# Patient Record
Sex: MALE | Race: WHITE | NOT HISPANIC OR LATINO | URBAN - METROPOLITAN AREA
[De-identification: names, ages, dates, MRNs, and addresses within clinical notes are randomized per-mention and may not be internally consistent; named-entity substitution may affect disease eponyms.]

---

## 2018-07-11 ENCOUNTER — APPOINTMENT (RX ONLY)
Dept: URBAN - METROPOLITAN AREA CLINIC 31 | Facility: CLINIC | Age: 23
Setting detail: DERMATOLOGY
End: 2018-07-11

## 2018-07-11 DIAGNOSIS — L20.89 OTHER ATOPIC DERMATITIS: ICD-10-CM

## 2018-07-11 DIAGNOSIS — L85.3 XEROSIS CUTIS: ICD-10-CM

## 2018-07-11 PROBLEM — L20.84 INTRINSIC (ALLERGIC) ECZEMA: Status: ACTIVE | Noted: 2018-07-11

## 2018-07-11 PROBLEM — J45.909 UNSPECIFIED ASTHMA, UNCOMPLICATED: Status: ACTIVE | Noted: 2018-07-11

## 2018-07-11 PROBLEM — K21.9 GASTRO-ESOPHAGEAL REFLUX DISEASE WITHOUT ESOPHAGITIS: Status: ACTIVE | Noted: 2018-07-11

## 2018-07-11 PROCEDURE — ? COUNSELING: TOPICAL STEROIDS

## 2018-07-11 PROCEDURE — 99203 OFFICE O/P NEW LOW 30 MIN: CPT

## 2018-07-11 PROCEDURE — ? COUNSELING

## 2018-07-11 PROCEDURE — ? PRESCRIPTION

## 2018-07-11 RX ORDER — HYDROCORTISONE 25 MG/G
1 CREAM TOPICAL BID
Qty: 1 | Refills: 3 | Status: ERX | COMMUNITY
Start: 2018-07-11

## 2018-07-11 RX ORDER — TRIAMCINOLONE ACETONIDE 1 MG/G
CREAM TOPICAL BID
Qty: 1 | Refills: 2 | Status: ERX | COMMUNITY
Start: 2018-07-11

## 2018-07-11 RX ADMIN — TRIAMCINOLONE ACETONIDE: 1 CREAM TOPICAL at 21:18

## 2018-07-11 RX ADMIN — HYDROCORTISONE 1: 25 CREAM TOPICAL at 21:18

## 2018-07-11 ASSESSMENT — LOCATION DETAILED DESCRIPTION DERM
LOCATION DETAILED: RIGHT ANTERIOR PROXIMAL THIGH
LOCATION DETAILED: LEFT CENTRAL MALAR CHEEK
LOCATION DETAILED: EPIGASTRIC SKIN
LOCATION DETAILED: RIGHT ANTECUBITAL SKIN
LOCATION DETAILED: RIGHT POPLITEAL SKIN
LOCATION DETAILED: RIGHT ULNAR DORSAL HAND
LOCATION DETAILED: RIGHT PROXIMAL DORSAL FOREARM
LOCATION DETAILED: LEFT RADIAL DORSAL HAND
LOCATION DETAILED: LEFT POPLITEAL SKIN
LOCATION DETAILED: RIGHT LATERAL MALAR CHEEK
LOCATION DETAILED: LEFT DISTAL POSTERIOR THIGH
LOCATION DETAILED: RIGHT DISTAL POSTERIOR THIGH
LOCATION DETAILED: LEFT ANTECUBITAL SKIN
LOCATION DETAILED: LEFT PROXIMAL DORSAL FOREARM
LOCATION DETAILED: LEFT ANTERIOR PROXIMAL THIGH

## 2018-07-11 ASSESSMENT — LOCATION SIMPLE DESCRIPTION DERM
LOCATION SIMPLE: LEFT CHEEK
LOCATION SIMPLE: LEFT FOREARM
LOCATION SIMPLE: LEFT THIGH
LOCATION SIMPLE: RIGHT FOREARM
LOCATION SIMPLE: ABDOMEN
LOCATION SIMPLE: LEFT POSTERIOR THIGH
LOCATION SIMPLE: RIGHT POSTERIOR THIGH
LOCATION SIMPLE: RIGHT THIGH
LOCATION SIMPLE: RIGHT POPLITEAL SKIN
LOCATION SIMPLE: RIGHT HAND
LOCATION SIMPLE: LEFT POPLITEAL SKIN
LOCATION SIMPLE: RIGHT CHEEK
LOCATION SIMPLE: LEFT HAND
LOCATION SIMPLE: RIGHT ELBOW
LOCATION SIMPLE: LEFT ELBOW

## 2018-07-11 ASSESSMENT — LOCATION ZONE DERM
LOCATION ZONE: FACE
LOCATION ZONE: HAND
LOCATION ZONE: ARM
LOCATION ZONE: TRUNK
LOCATION ZONE: LEG

## 2018-07-11 NOTE — PROCEDURE: COUNSELING
Detail Level: Zone
Patient Specific Counseling (Will Not Stick From Patient To Patient): We discussed protopic as well for face but would need to try HCT for insurance purposes.

## 2018-07-11 NOTE — HPI: DRY SKIN
How Severe Is Your Dry Skin?: mild
Additional History: Patient has rx of tax cream and fluocinonide for topical eczema

## 2018-10-12 ENCOUNTER — OFFICE VISIT (OUTPATIENT)
Dept: MEDICAL GROUP | Age: 23
End: 2018-10-12
Payer: COMMERCIAL

## 2018-10-12 VITALS
HEIGHT: 71 IN | WEIGHT: 202 LBS | TEMPERATURE: 98.7 F | BODY MASS INDEX: 28.28 KG/M2 | HEART RATE: 87 BPM | SYSTOLIC BLOOD PRESSURE: 90 MMHG | DIASTOLIC BLOOD PRESSURE: 64 MMHG | OXYGEN SATURATION: 92 %

## 2018-10-12 DIAGNOSIS — K21.9 GASTROESOPHAGEAL REFLUX DISEASE, ESOPHAGITIS PRESENCE NOT SPECIFIED: ICD-10-CM

## 2018-10-12 DIAGNOSIS — E53.8 B12 DEFICIENCY: ICD-10-CM

## 2018-10-12 DIAGNOSIS — G47.33 OSA (OBSTRUCTIVE SLEEP APNEA): ICD-10-CM

## 2018-10-12 DIAGNOSIS — J45.20 MILD INTERMITTENT ASTHMA, UNSPECIFIED WHETHER COMPLICATED: ICD-10-CM

## 2018-10-12 PROCEDURE — 99204 OFFICE O/P NEW MOD 45 MIN: CPT | Performed by: PHYSICIAN ASSISTANT

## 2018-10-12 RX ORDER — FLUOCINONIDE 0.5 MG/G
OINTMENT TOPICAL 2 TIMES DAILY
COMMUNITY
End: 2022-02-09

## 2018-10-12 RX ORDER — ALBUTEROL SULFATE 90 UG/1
2 AEROSOL, METERED RESPIRATORY (INHALATION) EVERY 6 HOURS PRN
Qty: 8.5 G | Refills: 5 | Status: SHIPPED | OUTPATIENT
Start: 2018-10-12 | End: 2019-02-21 | Stop reason: SDUPTHER

## 2018-10-12 RX ORDER — CYANOCOBALAMIN 1000 UG/ML
1000 INJECTION, SOLUTION INTRAMUSCULAR; SUBCUTANEOUS
COMMUNITY
End: 2019-04-05

## 2018-10-12 RX ORDER — OMEPRAZOLE 20 MG/1
20 CAPSULE, DELAYED RELEASE ORAL DAILY
COMMUNITY
End: 2019-07-08

## 2018-10-12 RX ORDER — RANITIDINE 150 MG/1
150 TABLET ORAL 2 TIMES DAILY
COMMUNITY
End: 2019-07-08

## 2018-10-12 ASSESSMENT — PATIENT HEALTH QUESTIONNAIRE - PHQ9: CLINICAL INTERPRETATION OF PHQ2 SCORE: 0

## 2018-10-13 NOTE — ASSESSMENT & PLAN NOTE
This is a chronic problem.  The patient was diagnosed at 17 after several months of waking with morning headaches and not feeling well rested.  He does have a family history of sleep apnea and his father, and is followed by pulmonology for this.  He uses a CPAP which has vastly improved his sleep.

## 2018-10-13 NOTE — ASSESSMENT & PLAN NOTE
"This is a chronic problem.  The patient tells me that he had a period where \"health was declining,\" where he couldn't walk well and couldn't maintain muscle tone, and was having frequent migraines.  After significant testing, he was diagnosed with B12 deficiency which is likely secondary to his long-term omeprazole use.  He follows with Dr. Linares his neurologist for B12 injections and ongoing care.    "

## 2018-10-13 NOTE — ASSESSMENT & PLAN NOTE
This is a chronic problem that is well controlled with intermittent use of his albuterol inhaler.  He requests a refill on this today, but notes that he only has to use this occasionally, typically about twice a month.  No current exacerbation.

## 2018-10-13 NOTE — ASSESSMENT & PLAN NOTE
This is a chronic problem.  Currently taking omeprazole 20 mg  in the morning with ranitidine 150 mg twice daily, which controls his symptoms well.  He denies cough, dysphasia, history of GI bleeding.

## 2018-10-13 NOTE — PROGRESS NOTES
"CC: B12 deficiency, sleep apnea, GERD, asthma    History of Present Illness: This is a 23 y.o. male new patient who presents today to establish care and discuss the chronic conditions outlined below. This patient previously saw a provider in [place] for primary care. Other specialists include pulmonology, neurology. This patient has a past medical history significant for B12 deficiency, obstructive sleep apnea, GERD, asthma.    BOB (obstructive sleep apnea)  This is a chronic problem.  The patient was diagnosed at 17 after several months of waking with morning headaches and not feeling well rested.  He does have a family history of sleep apnea and his father, and is followed by pulmonology for this.  He uses a CPAP which has vastly improved his sleep.      B12 deficiency  This is a chronic problem.  The patient tells me that he had a period where \"health was declining,\" where he couldn't walk well and couldn't maintain muscle tone, and was having frequent migraines.  After significant testing, he was diagnosed with B12 deficiency which is likely secondary to his long-term omeprazole use.  He follows with Dr. Linares his neurologist for B12 injections and ongoing care.      GERD (gastroesophageal reflux disease)  This is a chronic problem.  Currently taking omeprazole 20 mg  in the morning with ranitidine 150 mg twice daily, which controls his symptoms well.  He denies cough, dysphasia, history of GI bleeding.    Intermittent asthma  This is a chronic problem that is well controlled with intermittent use of his albuterol inhaler.  He requests a refill on this today, but notes that he only has to use this occasionally, typically about twice a month.  No current exacerbation.      Patient Active Problem List    Diagnosis Date Noted   • BOB (obstructive sleep apnea) 10/12/2018   • B12 deficiency 10/12/2018   • GERD (gastroesophageal reflux disease) 07/05/2011   • Intermittent asthma 10/14/2008          Additional " History:     Allergies   Allergen Reactions   • Ibuprofen Swelling       Current medicines (including changes today)  Current Outpatient Prescriptions   Medication Sig Dispense Refill   • omeprazole (PRILOSEC) 20 MG delayed-release capsule Take 20 mg by mouth every day.     • raNITidine (ZANTAC) 150 MG Tab Take 150 mg by mouth 2 times a day.     • Cetirizine HCl (ZYRTEC ALLERGY PO) Take  by mouth.     • fluocinonide (LIDEX) 0.05 % Ointment Apply  to affected area(s) 2 times a day.     • cyanocobalamin (VITAMIN B-12) 1000 MCG/ML Solution 1,000 mcg by Intramuscular route every 30 days.     • albuterol 108 (90 Base) MCG/ACT Aero Soln inhalation aerosol Inhale 2 Puffs by mouth every 6 hours as needed for Shortness of Breath. 8.5 g 5     No current facility-administered medications for this visit.      He  has a past medical history of Asthma; B12 deficiency (10/12/2018); Eczema; GERD (gastroesophageal reflux disease); and BOB (obstructive sleep apnea) (10/12/2018).  He  has no past surgical history on file.  Social History   Substance Use Topics   • Smoking status: Former Smoker   • Smokeless tobacco: Never Used   • Alcohol use Yes      Comment: one or two a week       No family history on file.  No family status information on file.       Patient Active Problem List    Diagnosis Date Noted   • BOB (obstructive sleep apnea) 10/12/2018   • B12 deficiency 10/12/2018   • GERD (gastroesophageal reflux disease) 07/05/2011   • Intermittent asthma 10/14/2008         Review of Systems:   Constitutional: Negative for fever, chills, unexpected weight change, fatigue, malaise and generalized weakness.   Eyes: Negative for blurred or double vision, eye pain, eye discharge.  ENT: Negative for headaches, hearing changes, ear pain, ear discharge, rhinorrhea, sinus congestion, sore throat, and neck pain.   Respiratory: Negative for cough, sputum production, chest congestion, dyspnea, wheezing, and crackles.   Cardiovascular: Negative  "for chest pain, palpitations, orthopnea, and bilateral lower extremity edema.   Gastrointestinal: Negative for heartburn, nausea, vomiting, abdominal pain, hematochezia, melena, diarrhea, constipation, and greasy/foul-smelling stools.   Genitourinary: Negative for dysuria, polyuria, hematuria, pyuria, urgency, frequency and incontinence.  Musculoskeletal: Negative for myalgias, back pain, and joint pain.   Skin: Negative for rash, itching, cyanotic skin color change.   Neurological: Negative for dizziness, tingling, tremors, focal sensory deficit, focal weakness and headaches.   Heme: Does not bruise/bleed easily.    Endocrine: Negative for heat or cold intolerance, polydipsia, polyuria.  Psychiatric/Behavioral: Negative for depression, suicidal or homicidal ideation and memory loss.         Physical Exam:   Vitals: Blood pressure (!) 90/64, pulse 87, temperature 37.1 °C (98.7 °F), temperature source Temporal, height 1.803 m (5' 11\"), weight 91.6 kg (202 lb), SpO2 92 %.  BMI: Body mass index is 28.17 kg/m².  General/Constitutional: Vitals as above, well nourished, well developed male in no acute distress  Head: Head is grossly normal & atraumatic.  Eyes: Bilateral conjunctivae clear and not injected, bilateral EOMI, bilateral PERRL  Neck: Neck supple, no masses, neck non-tender to palpation, no thyromegaly/goiter.  Lymph: No adenopathy in anterior/posterior cervical and supra-/infrascapular nodes.   Respiratory: Normal effort, lungs are clear to auscultation in all fields (anterior, lateral, posterior), no wheezing, rhonchi or rales.  Cardiovascular: Regular rate and rhythm without murmurs, gallops or rubs, no bilateral lower extremity edema.  Musculoskeletal: Gait grossly normal & not antalgic, no tenderness to percussion of vertebral processes, no CVAT.  Skin: Warm and dry with no apparent rashes or lesions.  Neuro: Gross motor movement intact in all 4 extremities, gross sensation intact to extremities and trunk, " gait grossly normal and not antalgic.  Cranial nerve examination: Pupils equally round and react to light. Extraocular muscles are intact. Visual fields intact. No facial droop. Hearing intact to conversation. Soft palate rises symmetrically bilaterally with uvula midline. Tongue midline and cranial nerve 12 intact. No abnormal facial movements. Resisted shoulder shrug 5/5 bilaterally.  Psych: Judgment grossly appropriate, no apparent depression/anxiety.      Health Maintenance: Due for immunizations, declines these today.    Imaging/Labs: N/A    Assessment/Plan:  Care has been established  We reviewed USPSTF guidelines  Records requests sent to previous care providers  Denies intimate partner violence    1. BOB (obstructive sleep apnea)  Controlled with current medications and lifestyle measures. No changes are indicated at this time.   - REFERRAL TO PULMONOLOGY    2. B12 deficiency  Controlled with current medications and lifestyle measures. No changes are indicated at this time.   - REFERRAL TO NEUROLOGY    3. Mild intermittent asthma, unspecified whether complicated  Controlled with current medications and lifestyle measures. No changes are indicated at this time.   - albuterol 108 (90 Base) MCG/ACT Aero Soln inhalation aerosol; Inhale 2 Puffs by mouth every 6 hours as needed for Shortness of Breath.  Dispense: 8.5 g; Refill: 5    4. Gastroesophageal reflux disease, esophagitis presence not specified  Controlled with current medications and lifestyle measures. No changes are indicated at this time.         Return if symptoms worsen or fail to improve.      Please note that this dictation was created using voice recognition software. I have made every reasonable attempt to correct obvious errors, but I expect that there are errors of grammar and possibly content that I did not discover before finalizing the note.

## 2018-10-24 ENCOUNTER — HOSPITAL ENCOUNTER (OUTPATIENT)
Dept: LAB | Facility: MEDICAL CENTER | Age: 23
End: 2018-10-24
Attending: NURSE PRACTITIONER
Payer: COMMERCIAL

## 2018-10-24 LAB
FOLATE SERPL-MCNC: 14.5 NG/ML
VIT B12 SERPL-MCNC: 881 PG/ML (ref 211–911)

## 2018-10-24 PROCEDURE — 82746 ASSAY OF FOLIC ACID SERUM: CPT

## 2018-10-24 PROCEDURE — 82607 VITAMIN B-12: CPT

## 2018-10-24 PROCEDURE — 36415 COLL VENOUS BLD VENIPUNCTURE: CPT

## 2019-01-24 ENCOUNTER — OFFICE VISIT (OUTPATIENT)
Dept: MEDICAL GROUP | Facility: MEDICAL CENTER | Age: 24
End: 2019-01-24
Payer: COMMERCIAL

## 2019-01-24 VITALS
HEART RATE: 72 BPM | OXYGEN SATURATION: 98 % | BODY MASS INDEX: 27.44 KG/M2 | TEMPERATURE: 98.2 F | DIASTOLIC BLOOD PRESSURE: 74 MMHG | SYSTOLIC BLOOD PRESSURE: 112 MMHG | RESPIRATION RATE: 18 BRPM | WEIGHT: 196 LBS | HEIGHT: 71 IN

## 2019-01-24 DIAGNOSIS — G47.33 OSA (OBSTRUCTIVE SLEEP APNEA): ICD-10-CM

## 2019-01-24 DIAGNOSIS — G47.00 INSOMNIA, UNSPECIFIED TYPE: ICD-10-CM

## 2019-01-24 DIAGNOSIS — G47.09 OTHER INSOMNIA: ICD-10-CM

## 2019-01-24 DIAGNOSIS — E53.8 B12 DEFICIENCY: ICD-10-CM

## 2019-01-24 DIAGNOSIS — F41.9 ANXIETY: ICD-10-CM

## 2019-01-24 PROCEDURE — 99204 OFFICE O/P NEW MOD 45 MIN: CPT | Performed by: FAMILY MEDICINE

## 2019-01-24 RX ORDER — AMITRIPTYLINE HYDROCHLORIDE 25 MG/1
TABLET, FILM COATED ORAL
Qty: 180 TAB | Refills: 1 | Status: SHIPPED | OUTPATIENT
Start: 2019-01-24 | End: 2019-07-08

## 2019-01-24 RX ORDER — AMITRIPTYLINE HYDROCHLORIDE 25 MG/1
TABLET, FILM COATED ORAL
Qty: 180 TAB | Refills: 1 | Status: SHIPPED | OUTPATIENT
Start: 2019-01-24 | End: 2019-01-24 | Stop reason: SDUPTHER

## 2019-01-24 RX ORDER — TRAZODONE HYDROCHLORIDE 50 MG/1
TABLET ORAL
Qty: 60 TAB | Refills: 3 | Status: SHIPPED | OUTPATIENT
Start: 2019-01-24 | End: 2019-01-24

## 2019-01-24 ASSESSMENT — PATIENT HEALTH QUESTIONNAIRE - PHQ9: CLINICAL INTERPRETATION OF PHQ2 SCORE: 0

## 2019-01-25 NOTE — ASSESSMENT & PLAN NOTE
Has problems falling asleep-->we discuss sleep hygine in detail   Wakes up a lot --> insomnia 2/2 other medical condition such as anxiety and sleep apnea  He has tried benzo before noticed dep/tolerance he is afraid of that   I discuss trazodone for prn use or safe for long term use   I discuss TCA which is helpful for sleep and anxiety he is interested in a trial of this      Over 45 minutes spent with patient face to face, greater than 50% time spent with plan/coordination of care regarding that which is discussed in the HPI and A&P

## 2019-01-25 NOTE — PROGRESS NOTES
This medical record contains text that has been entered with the assistance of computer voice recognition and dictation software.  Therefore, it may contain unintended errors in text, spelling, punctuation, or grammar        Chief Complaint   Patient presents with   • Other     Referral needed to Psychologist to help deal with sleep problems.        Santo Hernandez is a 23 y.o. male here evaluation and management of:     See above  He and dad both have horrible insomnia  Dad lives with him during the week   Anxiety too   Sleep apnea too  Asking for referral to a particular sleep clinic     Also wants to hear my thoughts      He grew up [place] area  Lived in [place] mostly   Moved to [place] for a couple years when his grandma was on hospice  New job in computer hardware repair x 6 mo         Current Outpatient Prescriptions   Medication Sig Dispense Refill   • amitriptyline (ELAVIL) 25 MG Tab Take 1 tab nightly for 3 nights then 2 tabs nightly for 3 nights then 3 tabs nightly for 3 nights then 4 tabs nightly indefinitely 180 Tab 1   • cyanocobalamin (VITAMIN B-12) 1000 MCG/ML Solution 1,000 mcg by Intramuscular route every 30 days.     • omeprazole (PRILOSEC) 20 MG delayed-release capsule Take 20 mg by mouth every day.     • raNITidine (ZANTAC) 150 MG Tab Take 150 mg by mouth 2 times a day.     • Cetirizine HCl (ZYRTEC ALLERGY PO) Take  by mouth.     • fluocinonide (LIDEX) 0.05 % Ointment Apply  to affected area(s) 2 times a day.     • albuterol 108 (90 Base) MCG/ACT Aero Soln inhalation aerosol Inhale 2 Puffs by mouth every 6 hours as needed for Shortness of Breath. 8.5 g 5     No current facility-administered medications for this visit.      Patient Active Problem List    Diagnosis Date Noted   • Other insomnia 01/24/2019   • Anxiety 01/24/2019   • BOB (obstructive sleep apnea) 10/12/2018   • B12 deficiency 10/12/2018   • GERD (gastroesophageal reflux disease) 07/05/2011   • Intermittent asthma 10/14/2008     History  "reviewed. No pertinent surgical history.   Social History   Substance Use Topics   • Smoking status: Former Smoker   • Smokeless tobacco: Never Used   • Alcohol use Yes      Comment: one or two a week     History reviewed. No pertinent family history.        ROS  No n/v  all review of system completed and negative except for those listed above     Objective:     Blood pressure 112/74, pulse 72, temperature 36.8 °C (98.2 °F), temperature source Temporal, resp. rate 18, height 1.803 m (5' 11\"), weight 88.9 kg (196 lb), SpO2 98 %. Body mass index is 27.34 kg/m².  Physical Exam:    Constitutional: Alert, no distress.  Skin: Warm, dry, good turgor, no rashes in visible areas.  Eye: Equal, round and reactive, conjunctiva clear, lids normal.  ENMT: Lips without lesions, good dentition, oropharynx clear.  Neck: Trachea midline, no masses, no thyromegaly. No cervical or supraclavicular lymphadenopathy.  Respiratory: Unlabored respiratory effort, lungs clear to auscultation, no wheezes, no ronchi.  Cardiovascular: Normal S1, S2, no murmur, no edema.  Abdomen: Soft, non-tender, no masses, no hepatosplenomegaly.  Psych: Alert and oriented x3, normal affect and mood.              Assessment and Plan:   The following treatment plan was discussed        Problem List Items Addressed This Visit     BOB (obstructive sleep apnea)     Has known BOB    Ref to pulm              Relevant Orders    REFERRAL TO SLEEP STUDIES    B12 deficiency     Said he had the full work up but is not sure if intrinsic factor was tested      See orders             Relevant Orders    VITAMIN B12    INTRINSIC FACTOR AB    Other insomnia     Has problems falling asleep-->we discuss sleep hygine in detail   Wakes up a lot --> insomnia 2/2 other medical condition such as anxiety and sleep apnea  He has tried benzo before noticed dep/tolerance he is afraid of that   I discuss trazodone for prn use or safe for long term use   I discuss TCA which is helpful for " "sleep and anxiety he is interested in a trial of this      Over 45 minutes spent with patient face to face, greater than 50% time spent with plan/coordination of care regarding that which is discussed in the HPI and A&P           Anxiety     He has tried and failed \"many\" sSRI SNRI   He is asking for ref to psychiatry             Relevant Medications    amitriptyline (ELAVIL) 25 MG Tab    Other Relevant Orders    REFERRAL TO PSYCHOLOGY    REFERRAL TO BEHAVIORAL HEALTH      Other Visit Diagnoses     Insomnia, unspecified type        Relevant Medications    amitriptyline (ELAVIL) 25 MG Tab                Instructed to follow up if symptoms worsen or fail to improve, ER/UC precautions discussed as well    Kristina Alva MD  [facility], Family Medicine   [address] [place] [address] [address]   [place] [id number]  Phone: [phone]             "

## 2019-02-19 ENCOUNTER — HOSPITAL ENCOUNTER (OUTPATIENT)
Dept: LAB | Facility: MEDICAL CENTER | Age: 24
End: 2019-02-19
Attending: PSYCHIATRY & NEUROLOGY
Payer: COMMERCIAL

## 2019-02-19 LAB — FOLATE SERPL-MCNC: 7 NG/ML

## 2019-02-19 PROCEDURE — 36415 COLL VENOUS BLD VENIPUNCTURE: CPT

## 2019-02-19 PROCEDURE — 82607 VITAMIN B-12: CPT

## 2019-02-19 PROCEDURE — 82746 ASSAY OF FOLIC ACID SERUM: CPT

## 2019-02-20 LAB — VIT B12 SERPL-MCNC: >1500 PG/ML (ref 211–911)

## 2019-02-21 DIAGNOSIS — J45.20 MILD INTERMITTENT ASTHMA, UNSPECIFIED WHETHER COMPLICATED: ICD-10-CM

## 2019-02-22 RX ORDER — ALBUTEROL SULFATE 90 UG/1
2 AEROSOL, METERED RESPIRATORY (INHALATION) EVERY 6 HOURS PRN
Qty: 8.5 G | Refills: 5 | Status: SHIPPED | OUTPATIENT
Start: 2019-02-22 | End: 2019-04-05 | Stop reason: SDUPTHER

## 2019-02-22 NOTE — TELEPHONE ENCOUNTER
----- Message from Florence August sent at 2/21/2019  4:06 PM PST -----  Regarding: Albuterol Refill  Contact: [phone]  Patient called to request refill on inhaler. From what I can see this hasn't been filled from anyone in our office. Let patient know he'd be contacted to let him know if he'll need an appt.     Preferred pharmacy: [id number] , [place] [address]         Thanks  Florence

## 2019-03-05 ENCOUNTER — OFFICE VISIT (OUTPATIENT)
Dept: URGENT CARE | Facility: CLINIC | Age: 24
End: 2019-03-05
Payer: COMMERCIAL

## 2019-03-05 VITALS
TEMPERATURE: 99.2 F | BODY MASS INDEX: 27.3 KG/M2 | SYSTOLIC BLOOD PRESSURE: 100 MMHG | RESPIRATION RATE: 16 BRPM | HEIGHT: 71 IN | OXYGEN SATURATION: 92 % | DIASTOLIC BLOOD PRESSURE: 70 MMHG | HEART RATE: 93 BPM | WEIGHT: 195 LBS

## 2019-03-05 DIAGNOSIS — R05.9 COUGH: ICD-10-CM

## 2019-03-05 DIAGNOSIS — J22 LRTI (LOWER RESPIRATORY TRACT INFECTION): ICD-10-CM

## 2019-03-05 DIAGNOSIS — J45.41 MODERATE PERSISTENT ASTHMA WITH ACUTE EXACERBATION: ICD-10-CM

## 2019-03-05 PROCEDURE — 99214 OFFICE O/P EST MOD 30 MIN: CPT | Performed by: NURSE PRACTITIONER

## 2019-03-05 RX ORDER — METHYLPREDNISOLONE 4 MG/1
4 TABLET ORAL DAILY
Qty: 1 KIT | Refills: 0 | Status: SHIPPED | OUTPATIENT
Start: 2019-03-05 | End: 2019-04-05

## 2019-03-05 RX ORDER — DOXYCYCLINE HYCLATE 100 MG
100 TABLET ORAL 2 TIMES DAILY
Qty: 14 TAB | Refills: 0 | Status: SHIPPED | OUTPATIENT
Start: 2019-03-05 | End: 2019-03-12

## 2019-03-05 ASSESSMENT — ENCOUNTER SYMPTOMS
WHEEZING: 1
FEVER: 0
DIZZINESS: 0
CHILLS: 1
MYALGIAS: 1
VOMITING: 0
EYE PAIN: 0
SORE THROAT: 0
COUGH: 1
NAUSEA: 0
SHORTNESS OF BREATH: 1

## 2019-03-06 NOTE — PROGRESS NOTES
"Subjective:   Santo Hernandez is a 23 y.o. male who presents for Cough (productive cough x2 wks)         Cough    This is a new problem. Episode onset: 2 weeks.  The problem has been gradually worsening. The problem occurs constantly. The cough is productive of sputum. Associated symptoms include chills, myalgias, nasal congestion, shortness of breath and wheezing. Pertinent negatives include no chest pain, fever, postnasal drip, rash or sore throat.  Nothing aggravates the symptoms. He has tried a beta-agonist inhaler for the symptoms. The treatment provided no relief. His past medical history is significant for asthma.  BOB     Review of Systems   Constitutional: Positive for chills. Negative for fever.   HENT: Negative for postnasal drip and sore throat.    Eyes: Negative for pain.   Respiratory: Positive for cough, shortness of breath and wheezing.    Cardiovascular: Negative for chest pain.   Gastrointestinal: Negative for nausea and vomiting.   Genitourinary: Negative for hematuria.   Musculoskeletal: Positive for myalgias.   Skin: Negative for rash.   Neurological: Negative for dizziness.     Allergies   Allergen Reactions   • Ibuprofen Swelling      Objective:   /70 (BP Location: Left arm, Patient Position: Sitting, BP Cuff Size: Adult)   Pulse 93   Temp 37.3 °C (99.2 °F)   Resp 16   Ht 1.803 m (5' 11\")   Wt 88.5 kg (195 lb)   SpO2 92%   BMI 27.20 kg/m²    Physical Exam   Constitutional: He is oriented to person, place, and time. He appears well-developed and well-nourished. No distress.   HENT:   Head: Normocephalic and atraumatic.   Right Ear: Tympanic membrane normal.   Left Ear: Tympanic membrane normal.   Nose: Nose normal. Right sinus exhibits no maxillary sinus tenderness and no frontal sinus tenderness. Left sinus exhibits no maxillary sinus tenderness and no frontal sinus tenderness.   Mouth/Throat: Uvula is midline, oropharynx is clear and moist and mucous membranes are normal. No " posterior oropharyngeal edema, posterior oropharyngeal erythema or tonsillar abscesses. No tonsillar exudate.   Eyes: Pupils are equal, round, and reactive to light. Conjunctivae and EOM are normal. Right eye exhibits no discharge. Left eye exhibits no discharge.   Cardiovascular: Normal rate and regular rhythm.    No murmur heard.  Pulmonary/Chest: Effort normal. No respiratory distress. He has decreased breath sounds. He has wheezes. He has no rhonchi. He has no rales.   Abdominal: Soft. He exhibits no distension. There is no tenderness.   Neurological: He is alert and oriented to person, place, and time. He has normal reflexes. No sensory deficit.   Skin: Skin is warm, dry and intact.   Psychiatric: He has a normal mood and affect.         Assessment/Plan:     1. LRTI (lower respiratory tract infection)  doxycycline (VIBRAMYCIN) 100 MG Tab   2. Cough     3. Moderate persistent asthma with acute exacerbation  MethylPREDNISolone (MEDROL DOSEPAK) 4 MG Tablet Therapy Pack     Patient with worsening cough times 2 weeks, diminished lung sounds with wheezing throughout.  Will start patient on doxycycline twice daily times 7 days and steroids.  Encouraged to continue using prescribed inhalers as directed.  Advised patient if symptoms not improving and/or worsen he needs to return to clinic for a chest x-ray at this time.  Patient verbalizing understanding.  Patient given precautionary s/sx that mandate immediate follow up and evaluation in the ED. Advised of risks of not doing so.    DDX, Supportive care, and indications for immediate follow-up discussed with patient.    Instructed to return to clinic or nearest emergency department if we are not available for any change in condition, further concerns, or worsening of symptoms.    The patient demonstrated a good understanding and agreed with the treatment plan.;l

## 2019-04-03 ENCOUNTER — HOSPITAL ENCOUNTER (EMERGENCY)
Facility: MEDICAL CENTER | Age: 24
End: 2019-04-03
Attending: EMERGENCY MEDICINE
Payer: COMMERCIAL

## 2019-04-03 ENCOUNTER — APPOINTMENT (OUTPATIENT)
Dept: RADIOLOGY | Facility: MEDICAL CENTER | Age: 24
End: 2019-04-03
Attending: EMERGENCY MEDICINE
Payer: COMMERCIAL

## 2019-04-03 VITALS
BODY MASS INDEX: 26.54 KG/M2 | DIASTOLIC BLOOD PRESSURE: 88 MMHG | RESPIRATION RATE: 15 BRPM | HEART RATE: 69 BPM | TEMPERATURE: 97.8 F | OXYGEN SATURATION: 97 % | WEIGHT: 190.26 LBS | SYSTOLIC BLOOD PRESSURE: 119 MMHG

## 2019-04-03 DIAGNOSIS — R10.9 RIGHT FLANK PAIN: ICD-10-CM

## 2019-04-03 DIAGNOSIS — M79.10 MUSCLE PAIN: ICD-10-CM

## 2019-04-03 LAB
ALBUMIN SERPL BCP-MCNC: 4.3 G/DL (ref 3.2–4.9)
ALBUMIN/GLOB SERPL: 2 G/DL
ALP SERPL-CCNC: 73 U/L (ref 30–99)
ALT SERPL-CCNC: 29 U/L (ref 2–50)
ANION GAP SERPL CALC-SCNC: 12 MMOL/L (ref 0–11.9)
APPEARANCE UR: CLEAR
AST SERPL-CCNC: 22 U/L (ref 12–45)
BASOPHILS # BLD AUTO: 0.5 % (ref 0–1.8)
BASOPHILS # BLD: 0.05 K/UL (ref 0–0.12)
BILIRUB SERPL-MCNC: 0.5 MG/DL (ref 0.1–1.5)
BILIRUB UR QL STRIP.AUTO: NEGATIVE
BUN SERPL-MCNC: 12 MG/DL (ref 8–22)
CALCIUM SERPL-MCNC: 9.3 MG/DL (ref 8.5–10.5)
CHLORIDE SERPL-SCNC: 105 MMOL/L (ref 96–112)
CO2 SERPL-SCNC: 24 MMOL/L (ref 20–33)
COLOR UR: YELLOW
CREAT SERPL-MCNC: 1.06 MG/DL (ref 0.5–1.4)
EOSINOPHIL # BLD AUTO: 0.47 K/UL (ref 0–0.51)
EOSINOPHIL NFR BLD: 4.2 % (ref 0–6.9)
ERYTHROCYTE [DISTWIDTH] IN BLOOD BY AUTOMATED COUNT: 41 FL (ref 35.9–50)
GLOBULIN SER CALC-MCNC: 2.2 G/DL (ref 1.9–3.5)
GLUCOSE SERPL-MCNC: 97 MG/DL (ref 65–99)
GLUCOSE UR STRIP.AUTO-MCNC: NEGATIVE MG/DL
HCT VFR BLD AUTO: 45.9 % (ref 42–52)
HGB BLD-MCNC: 15.3 G/DL (ref 14–18)
IMM GRANULOCYTES # BLD AUTO: 0.05 K/UL (ref 0–0.11)
IMM GRANULOCYTES NFR BLD AUTO: 0.5 % (ref 0–0.9)
KETONES UR STRIP.AUTO-MCNC: NEGATIVE MG/DL
LEUKOCYTE ESTERASE UR QL STRIP.AUTO: NEGATIVE
LYMPHOCYTES # BLD AUTO: 2.08 K/UL (ref 1–4.8)
LYMPHOCYTES NFR BLD: 18.8 % (ref 22–41)
MCH RBC QN AUTO: 30.4 PG (ref 27–33)
MCHC RBC AUTO-ENTMCNC: 33.3 G/DL (ref 33.7–35.3)
MCV RBC AUTO: 91.3 FL (ref 81.4–97.8)
MICRO URNS: NORMAL
MONOCYTES # BLD AUTO: 0.73 K/UL (ref 0–0.85)
MONOCYTES NFR BLD AUTO: 6.6 % (ref 0–13.4)
NEUTROPHILS # BLD AUTO: 7.71 K/UL (ref 1.82–7.42)
NEUTROPHILS NFR BLD: 69.4 % (ref 44–72)
NITRITE UR QL STRIP.AUTO: NEGATIVE
NRBC # BLD AUTO: 0 K/UL
NRBC BLD-RTO: 0 /100 WBC
PH UR STRIP.AUTO: 5.5 [PH]
PLATELET # BLD AUTO: 285 K/UL (ref 164–446)
PMV BLD AUTO: 9.4 FL (ref 9–12.9)
POTASSIUM SERPL-SCNC: 3.7 MMOL/L (ref 3.6–5.5)
PROT SERPL-MCNC: 6.5 G/DL (ref 6–8.2)
PROT UR QL STRIP: NEGATIVE MG/DL
RBC # BLD AUTO: 5.03 M/UL (ref 4.7–6.1)
RBC UR QL AUTO: NEGATIVE
SODIUM SERPL-SCNC: 141 MMOL/L (ref 135–145)
SP GR UR STRIP.AUTO: 1.01
UROBILINOGEN UR STRIP.AUTO-MCNC: 0.2 MG/DL
WBC # BLD AUTO: 11.1 K/UL (ref 4.8–10.8)

## 2019-04-03 PROCEDURE — 700117 HCHG RX CONTRAST REV CODE 255: Performed by: EMERGENCY MEDICINE

## 2019-04-03 PROCEDURE — 80053 COMPREHEN METABOLIC PANEL: CPT

## 2019-04-03 PROCEDURE — 700102 HCHG RX REV CODE 250 W/ 637 OVERRIDE(OP): Performed by: EMERGENCY MEDICINE

## 2019-04-03 PROCEDURE — 36415 COLL VENOUS BLD VENIPUNCTURE: CPT

## 2019-04-03 PROCEDURE — 81003 URINALYSIS AUTO W/O SCOPE: CPT

## 2019-04-03 PROCEDURE — 74177 CT ABD & PELVIS W/CONTRAST: CPT

## 2019-04-03 PROCEDURE — A9270 NON-COVERED ITEM OR SERVICE: HCPCS | Performed by: EMERGENCY MEDICINE

## 2019-04-03 PROCEDURE — 99284 EMERGENCY DEPT VISIT MOD MDM: CPT

## 2019-04-03 PROCEDURE — 85025 COMPLETE CBC W/AUTO DIFF WBC: CPT

## 2019-04-03 RX ORDER — OXYCODONE AND ACETAMINOPHEN 10; 325 MG/1; MG/1
1 TABLET ORAL ONCE
Status: COMPLETED | OUTPATIENT
Start: 2019-04-03 | End: 2019-04-03

## 2019-04-03 RX ADMIN — IOHEXOL 100 ML: 350 INJECTION, SOLUTION INTRAVENOUS at 02:57

## 2019-04-03 RX ADMIN — OXYCODONE AND ACETAMINOPHEN 1 TABLET: 10; 325 TABLET ORAL at 01:34

## 2019-04-03 ASSESSMENT — PAIN DESCRIPTION - DESCRIPTORS: DESCRIPTORS: DULL

## 2019-04-03 NOTE — ED PROVIDER NOTES
ED Provider Note    CHIEF COMPLAINT  Chief Complaint   Patient presents with   • Flank Pain       HPI  Santo Hernandez is a 23 y.o. male who presents to the emergency department chief complaint of right mid flank pain radiating to the right mid abdomen over the last 4-1/2 5 hours.  He denies any pain that radiates down the leg he denies any trauma no fevers chills no nausea vomiting no diarrhea no constipation no dysuria no hematuria.  He is never had pain like this before and states that she is getting worse rather than better.  Currently the pain is a 6 out of 10 and worse with movement and palpation    REVIEW OF SYSTEMS  Positives as above. Pertinent negatives include dysuria hematuria fevers chills nausea vomiting constipation diarrhea  All other review of systems are negative    PAST MEDICAL HISTORY   has a past medical history of Asthma; B12 deficiency (10/12/2018); Eczema; GERD (gastroesophageal reflux disease); and BOB (obstructive sleep apnea) (10/12/2018).    SOCIAL HISTORY  Social History     Social History Main Topics   • Smoking status: Former Smoker   • Smokeless tobacco: Never Used   • Alcohol use Yes      Comment: one or two a week   • Drug use: Yes     Types: Marijuana, Inhaled      Comment: THC   • Sexual activity: Not on file       SURGICAL HISTORY  patient denies any surgical history    CURRENT MEDICATIONS  Home Medications     Reviewed by Laurence Wright R.N. (Registered Nurse) on 04/03/19 at 0050  Med List Status: Partial   Medication Last Dose Status   albuterol 108 (90 Base) MCG/ACT Aero Soln inhalation aerosol  Active   amitriptyline (ELAVIL) 25 MG Tab  Active   Cetirizine HCl (ZYRTEC ALLERGY PO)  Active   cyanocobalamin (VITAMIN B-12) 1000 MCG/ML Solution  Active   fluocinonide (LIDEX) 0.05 % Ointment  Active   MethylPREDNISolone (MEDROL DOSEPAK) 4 MG Tablet Therapy Pack  Active   omeprazole (PRILOSEC) 20 MG delayed-release capsule  Active   raNITidine (ZANTAC) 150 MG Tab  Active                 ALLERGIES  Allergies   Allergen Reactions   • Ibuprofen Swelling       PHYSICAL EXAM  VITAL SIGNS: /79   Pulse 70   Temp 36.6 °C (97.9 °F) (Temporal)   Resp 16   Wt 86.3 kg (190 lb 4.1 oz)   SpO2 96%   BMI 26.54 kg/m²    Pulse ox interpretation: I interpret this pulse ox as normal.  Constitutional: Alert in no apparent distress.  HENT: Normocephalic atraumatic, MMM  Eyes: PER, Conjunctiva normal, Non-icteric.   Neck: Normal range of motion, No tenderness, Supple, No stridor.    Cardiovascular: Regular rate and rhythm, no murmurs.   Thorax & Lungs: Normal breath sounds, No respiratory distress, No wheezing, No chest tenderness.   Abdomen: Bowel sounds normal, Soft, No tenderness, No pulsatile masses. No peritoneal signs.  Skin: Warm, Dry, No erythema, No rash.   Back: No bony tenderness, No CVA tenderness. Mild r mid flank ttp  Extremities: Intact distal pulses, No edema, No tenderness, No cyanosis  Neurologic: Alert and oriented x3, No focal deficits noted.       DIFFERENTIAL DIAGNOSIS AND WORK UP PLAN    This is a 23 y.o. male who presents with right flank pain rating the right lower quadrant his abdomen is pretty soft is more discomfort back discomfort there is no rash in the area feels more deeper to the patient rather than muscular there is no midline tenderness nothing radiating down the leg.  Will evaluate for renal stone of the urinalysis versus colitis or atypical appendicitis versus muscular strain or sprain.    DIAGNOSTIC STUDIES / PROCEDURES    EKG  No results found for this or any previous visit.    LABS  Pertinent Lab Findings  CBC with mildly elevated white blood cell count without a left shift CMP within normal limits urinalysis within normal limits without hematuria      RADIOLOGY  CT-ABDOMEN-PELVIS WITH   Final Result         1.  No acute abnormality.        The radiologist's interpretation of all radiological studies have been reviewed by me.      COURSE & MEDICAL DECISION  MAKING  Pertinent Labs & Imaging studies reviewed. (See chart for details)    1:50 AM  Patient's urinalysis is completely within normal limits without any signs of red blood cells or hematuria thus a very low chance the patient's got a renal stone on that side so I decided to change the scan to CT scan with contrast patient understands the plan - discussed w CT as well    3:16 AM  Reassess patient bedside is resting comfortably no evidence of intra-abdominal infection there is no evidence of an external signs of zoster at this time no signs of a renal stone.  He is otherwise well-appearing and resting.  I discussed this may be musculoskeletal or an early zoster but otherwise ice packs Tylenol range of motion exercises and stretching will help him.  He was given strict 24-hour precautions for any new or worsening right lower quadrant pain vomiting and fevers.  He understands feels comfortable going home    /88   Pulse 69   Temp 36.6 °C (97.8 °F) (Temporal)   Resp 15   Wt 86.3 kg (190 lb 4.1 oz)   SpO2 97%   BMI 26.54 kg/m²     The patient will return for new or worsening symptoms and is stable at the time of discharge.    The patient is referred to a primary physician for blood pressure management, diabetic screening, and for all other preventative health concerns.    DISPOSITION:  Patient will be discharged home in stable condition.    FOLLOW UP:  Kristina Alva M.D.  [address] [facility]  [address] [place] [id number] [phone]    Schedule an appointment as soon as possible for a visit       [facility], Emergency Dept  [address] [facility] [address]  [phone]  In 1 day  If symptoms worsen      OUTPATIENT MEDICATIONS:  Discharge Medication List as of 4/3/2019  3:24 AM            FINAL IMPRESSION  1. Right flank pain    2. Muscle pain              Electronically signed by: Jackie Cruz, 4/3/2019 1:03 AM    This dictation has been created using voice recognition  software and/or scribes. The accuracy of the dictation is limited by the abilities of the software and the expertise of the scribes. I expect there may be some errors of grammar and possibly content. I made every attempt to manually correct the errors within my dictation. However, errors related to voice recognition software and/or scribes may still exist and should be interpreted within the appropriate context.

## 2019-04-03 NOTE — ED TRIAGE NOTES
Chief Complaint   Patient presents with   • Flank Pain     Patient ambulatory to triage. Patient states that he has been having continuous Right sided flank pain that radiates to his spine and abdomen. The pain is dull and rated at a 6/10 on the pain scale. Patient has no other complaints. /79   Pulse 70   Temp 36.6 °C (97.9 °F) (Temporal)   Resp 16   Wt 86.3 kg (190 lb 4.1 oz)   SpO2 96%   BMI 26.54 kg/m²

## 2019-04-03 NOTE — DISCHARGE INSTRUCTIONS
The cause of your pain is not entirely known but may be related to muscle strain or spasm - please use tylenol and ice packs and stretching for your discomfort    Return to the ED with any new or worsening right lower abdominal pain, fevers and vomiting - I do not believe this is an early appendicitis but if these symptoms develop you may need a recheck

## 2019-04-05 ENCOUNTER — OFFICE VISIT (OUTPATIENT)
Dept: MEDICAL GROUP | Facility: MEDICAL CENTER | Age: 24
End: 2019-04-05
Payer: COMMERCIAL

## 2019-04-05 VITALS
DIASTOLIC BLOOD PRESSURE: 66 MMHG | BODY MASS INDEX: 26.81 KG/M2 | SYSTOLIC BLOOD PRESSURE: 102 MMHG | WEIGHT: 191.5 LBS | RESPIRATION RATE: 14 BRPM | TEMPERATURE: 98.8 F | HEIGHT: 71 IN | OXYGEN SATURATION: 93 % | HEART RATE: 64 BPM

## 2019-04-05 DIAGNOSIS — G47.09 OTHER INSOMNIA: ICD-10-CM

## 2019-04-05 DIAGNOSIS — J45.20 MILD INTERMITTENT ASTHMA, UNSPECIFIED WHETHER COMPLICATED: ICD-10-CM

## 2019-04-05 DIAGNOSIS — F41.9 ANXIETY: ICD-10-CM

## 2019-04-05 PROCEDURE — 99214 OFFICE O/P EST MOD 30 MIN: CPT | Performed by: PHYSICIAN ASSISTANT

## 2019-04-05 RX ORDER — ALBUTEROL SULFATE 90 UG/1
2 AEROSOL, METERED RESPIRATORY (INHALATION) EVERY 6 HOURS PRN
Qty: 8.5 G | Refills: 3 | Status: SHIPPED | OUTPATIENT
Start: 2019-04-05 | End: 2021-05-14 | Stop reason: SDUPTHER

## 2019-04-05 RX ORDER — DIAZEPAM 2 MG/1
1 TABLET ORAL
Qty: 14 TAB | Refills: 0 | Status: SHIPPED | OUTPATIENT
Start: 2019-04-05 | End: 2019-04-19

## 2019-04-05 NOTE — PROGRESS NOTES
Subjective:   Santo Hernandez is a 23 y.o. male here today for f/u ER visit, anxiety    Anxiety  Patient chronically has generalized anxiety disorder. Dx with psychiatrist around [id number]. Notes reviewed from [place]. Tried multiple medications including wellbutrin, zoloft, effexor. Valium worked well. He doesn't want to take daily but would like to have a prescription for prn panic attacks. Anxiety lately is getting worse. He went to the ER on 4/3 for side pain that was muscle spasm. Remembers from previous years that anxiety will cause muscle spasms and valium helps. His sleep psychologist has a name of a psychiatrist he plans to establish with.    Intermittent asthma  Chronic, stable, needs refill of albuterol    Other insomnia  Didn't start amitriptyline, seeing a sleep psychologist       Current medicines (including changes today)  Current Outpatient Prescriptions   Medication Sig Dispense Refill   • diazePAM (VALIUM) 2 MG Tab Take 0.5 Tabs by mouth 1 time daily as needed for Anxiety for up to 14 days. 14 Tab 0   • albuterol 108 (90 Base) MCG/ACT Aero Soln inhalation aerosol Inhale 2 Puffs by mouth every 6 hours as needed for Shortness of Breath. 8.5 g 3   • amitriptyline (ELAVIL) 25 MG Tab Take 1 tab nightly for 3 nights then 2 tabs nightly for 3 nights then 3 tabs nightly for 3 nights then 4 tabs nightly indefinitely 180 Tab 1   • omeprazole (PRILOSEC) 20 MG delayed-release capsule Take 20 mg by mouth every day.     • raNITidine (ZANTAC) 150 MG Tab Take 150 mg by mouth 2 times a day.     • Cetirizine HCl (ZYRTEC ALLERGY PO) Take  by mouth.     • fluocinonide (LIDEX) 0.05 % Ointment Apply  to affected area(s) 2 times a day.       No current facility-administered medications for this visit.      He  has a past medical history of Asthma; B12 deficiency (10/12/2018); Eczema; GERD (gastroesophageal reflux disease); and BBO (obstructive sleep apnea) (10/12/2018).    ROS   No fever/chills. No weight change. No  "headache/dizziness. No focal weakness. No sore throat, nasal congestion, ear pain. No chest pain, no shortness of breath, difficulty breathing. No n/v/d/c or abdominal pain. No urinary complaint. No rash or skin lesion. No joint pain or swelling.     Objective:     /66 (BP Location: Left arm, Patient Position: Sitting, BP Cuff Size: Adult)   Pulse 64   Temp 37.1 °C (98.8 °F) (Temporal)   Resp 14   Ht 1.803 m (5' 11\")   Wt 86.9 kg (191 lb 8 oz)   SpO2 93%  Body mass index is 26.71 kg/m².   Physical Exam:  Constitutional: WDWN, NAD  Skin: Warm, dry, good turgor, no rashes in visible areas.  Psych: Alert and oriented x3, normal affect and mood.    Assessment and Plan:   The following treatment plan was discussed    1. Anxiety - discussed that we do not prescribe chronic benzo for anxiety but I will give a 2 week prescription for prn use until he is able to see a psychiatrist. Patient agrees and states understanding.  [facility] Aware web site evaluation: I have obtained and reviewed patient utilization report from [facility] pharmacy database prior to writing prescription for controlled substance II, III or IV. Based on the report and my clinical assessment the prescription is medically necessary.   Patient is cautioned on sedation potential of benzodiazepine medication; no drinking, driving or operating heavy machinery while on this medication.  - diazePAM (VALIUM) 2 MG Tab; Take 0.5 Tabs by mouth 1 time daily as needed for Anxiety for up to 14 days.  Dispense: 14 Tab; Refill: 0  - Consent for Opiate Prescription    2. Mild intermittent asthma, unspecified whether complicated    - albuterol 108 (90 Base) MCG/ACT Aero Soln inhalation aerosol; Inhale 2 Puffs by mouth every 6 hours as needed for Shortness of Breath.  Dispense: 8.5 g; Refill: 3    3. Other insomnia        Followup: prn         "

## 2019-04-05 NOTE — ASSESSMENT & PLAN NOTE
Patient chronically has generalized anxiety disorder. Dx with psychiatrist around [phone]. Notes reviewed from [place]. Tried multiple medications including wellbutrin, zoloft, effexor. Valium worked well. He doesn't want to take daily but would like to have a prescription for prn panic attacks. Anxiety lately is getting worse. He went to the ER on 4/3 for side pain that was muscle spasm. Remembers from previous years that anxiety will cause muscle spasms and valium helps. His sleep psychologist has a name of a psychiatrist he plans to establish with.

## 2019-04-08 ENCOUNTER — TELEPHONE (OUTPATIENT)
Dept: MEDICAL GROUP | Facility: MEDICAL CENTER | Age: 24
End: 2019-04-08

## 2019-04-08 DIAGNOSIS — G47.09 OTHER INSOMNIA: ICD-10-CM

## 2019-04-08 DIAGNOSIS — F41.9 ANXIETY: ICD-10-CM

## 2019-04-08 NOTE — TELEPHONE ENCOUNTER
1. Caller Name: Santo Hernandez                                               Call Back Number: [phone] (home)           Patient approves a detailed voicemail message: N\A    2. SPECIFIC Action To Be Taken: Referral pending, please sign.    3. Diagnosis/Clinical Reason for Request: Anxiety    4. Specialty & Provider Name/Lab/Imaging Location: Dr. [facility]    5. Is appointment scheduled for requested order/referral: no    Patient was not informed they will receive a return phone call from the office ONLY if there are any questions before processing their request. Advised to call back if they haven't received a call from the referral department in 5 days.

## 2019-05-09 ENCOUNTER — APPOINTMENT (OUTPATIENT)
Dept: MEDICAL GROUP | Facility: MEDICAL CENTER | Age: 24
End: 2019-05-09
Payer: COMMERCIAL

## 2019-06-17 ENCOUNTER — HOSPITAL ENCOUNTER (OUTPATIENT)
Dept: LAB | Facility: MEDICAL CENTER | Age: 24
End: 2019-06-17
Attending: PSYCHIATRY & NEUROLOGY
Payer: COMMERCIAL

## 2019-06-17 LAB
BASOPHILS # BLD AUTO: 0.8 % (ref 0–1.8)
BASOPHILS # BLD: 0.05 K/UL (ref 0–0.12)
EOSINOPHIL # BLD AUTO: 0.34 K/UL (ref 0–0.51)
EOSINOPHIL NFR BLD: 5.3 % (ref 0–6.9)
ERYTHROCYTE [DISTWIDTH] IN BLOOD BY AUTOMATED COUNT: 41 FL (ref 35.9–50)
HCT VFR BLD AUTO: 46.9 % (ref 42–52)
HGB BLD-MCNC: 15.6 G/DL (ref 14–18)
IMM GRANULOCYTES # BLD AUTO: 0.02 K/UL (ref 0–0.11)
IMM GRANULOCYTES NFR BLD AUTO: 0.3 % (ref 0–0.9)
LYMPHOCYTES # BLD AUTO: 2.5 K/UL (ref 1–4.8)
LYMPHOCYTES NFR BLD: 38.9 % (ref 22–41)
MCH RBC QN AUTO: 30.8 PG (ref 27–33)
MCHC RBC AUTO-ENTMCNC: 33.3 G/DL (ref 33.7–35.3)
MCV RBC AUTO: 92.7 FL (ref 81.4–97.8)
MONOCYTES # BLD AUTO: 0.43 K/UL (ref 0–0.85)
MONOCYTES NFR BLD AUTO: 6.7 % (ref 0–13.4)
NEUTROPHILS # BLD AUTO: 3.08 K/UL (ref 1.82–7.42)
NEUTROPHILS NFR BLD: 48 % (ref 44–72)
NRBC # BLD AUTO: 0 K/UL
NRBC BLD-RTO: 0 /100 WBC
PLATELET # BLD AUTO: 351 K/UL (ref 164–446)
PMV BLD AUTO: 10.4 FL (ref 9–12.9)
RBC # BLD AUTO: 5.06 M/UL (ref 4.7–6.1)
WBC # BLD AUTO: 6.4 K/UL (ref 4.8–10.8)

## 2019-06-17 PROCEDURE — 82306 VITAMIN D 25 HYDROXY: CPT

## 2019-06-17 PROCEDURE — 82607 VITAMIN B-12: CPT

## 2019-06-17 PROCEDURE — 80053 COMPREHEN METABOLIC PANEL: CPT

## 2019-06-17 PROCEDURE — 84443 ASSAY THYROID STIM HORMONE: CPT

## 2019-06-17 PROCEDURE — 81291 MTHFR GENE: CPT

## 2019-06-17 PROCEDURE — 36415 COLL VENOUS BLD VENIPUNCTURE: CPT

## 2019-06-17 PROCEDURE — 85025 COMPLETE CBC W/AUTO DIFF WBC: CPT

## 2019-06-17 PROCEDURE — 80307 DRUG TEST PRSMV CHEM ANLYZR: CPT

## 2019-06-17 PROCEDURE — 84439 ASSAY OF FREE THYROXINE: CPT

## 2019-06-18 LAB
25(OH)D3 SERPL-MCNC: 14 NG/ML (ref 30–100)
ALBUMIN SERPL BCP-MCNC: 4 G/DL (ref 3.2–4.9)
ALBUMIN/GLOB SERPL: 1.5 G/DL
ALP SERPL-CCNC: 75 U/L (ref 30–99)
ALT SERPL-CCNC: 27 U/L (ref 2–50)
ANION GAP SERPL CALC-SCNC: 7 MMOL/L (ref 0–11.9)
AST SERPL-CCNC: 23 U/L (ref 12–45)
BILIRUB SERPL-MCNC: 0.3 MG/DL (ref 0.1–1.5)
BUN SERPL-MCNC: 9 MG/DL (ref 8–22)
CALCIUM SERPL-MCNC: 9.3 MG/DL (ref 8.5–10.5)
CHLORIDE SERPL-SCNC: 103 MMOL/L (ref 96–112)
CO2 SERPL-SCNC: 29 MMOL/L (ref 20–33)
CREAT SERPL-MCNC: 0.97 MG/DL (ref 0.5–1.4)
FASTING STATUS PATIENT QL REPORTED: NORMAL
GLOBULIN SER CALC-MCNC: 2.7 G/DL (ref 1.9–3.5)
GLUCOSE SERPL-MCNC: 94 MG/DL (ref 65–99)
POTASSIUM SERPL-SCNC: 4.1 MMOL/L (ref 3.6–5.5)
PROT SERPL-MCNC: 6.7 G/DL (ref 6–8.2)
SODIUM SERPL-SCNC: 139 MMOL/L (ref 135–145)
T4 FREE SERPL-MCNC: 0.87 NG/DL (ref 0.53–1.43)
TSH SERPL DL<=0.005 MIU/L-ACNC: 2.08 UIU/ML (ref 0.38–5.33)
VIT B12 SERPL-MCNC: 1416 PG/ML (ref 211–911)

## 2019-06-19 LAB
AMPHET CTO UR CFM-MCNC: NEGATIVE NG/ML
BARBITURATES CTO UR CFM-MCNC: NEGATIVE NG/ML
BENZODIAZ CTO UR CFM-MCNC: NEGATIVE NG/ML
CANNABINOIDS CTO UR CFM-MCNC: POSITIVE NG/ML
COCAINE CTO UR CFM-MCNC: NEGATIVE NG/ML
DRUG COMMENT 753798: NORMAL
METHADONE CTO UR CFM-MCNC: NEGATIVE NG/ML
OPIATES CTO UR CFM-MCNC: NEGATIVE NG/ML
PCP CTO UR CFM-MCNC: NEGATIVE NG/ML
PROPOXYPH CTO UR CFM-MCNC: NEGATIVE NG/ML

## 2019-06-21 LAB — THC UR CFM-MCNC: 489 NG/ML

## 2019-06-23 LAB
MTHFR C.1298A>C GENO BLD/T: ABNORMAL
MTHFR C.677C>T GENO BLD/T: ABNORMAL
MTHFR GENE MUT ANL BLD/T: ABNORMAL

## 2019-07-08 ENCOUNTER — OFFICE VISIT (OUTPATIENT)
Dept: MEDICAL GROUP | Facility: MEDICAL CENTER | Age: 24
End: 2019-07-08
Payer: COMMERCIAL

## 2019-07-08 VITALS
RESPIRATION RATE: 14 BRPM | DIASTOLIC BLOOD PRESSURE: 60 MMHG | HEART RATE: 90 BPM | BODY MASS INDEX: 26.04 KG/M2 | SYSTOLIC BLOOD PRESSURE: 114 MMHG | HEIGHT: 71 IN | WEIGHT: 186 LBS | OXYGEN SATURATION: 95 % | TEMPERATURE: 98.6 F

## 2019-07-08 DIAGNOSIS — Z71.85 VACCINE COUNSELING: ICD-10-CM

## 2019-07-08 PROCEDURE — 90732 PPSV23 VACC 2 YRS+ SUBQ/IM: CPT | Performed by: FAMILY MEDICINE

## 2019-07-08 PROCEDURE — 90715 TDAP VACCINE 7 YRS/> IM: CPT | Performed by: FAMILY MEDICINE

## 2019-07-08 PROCEDURE — 90471 IMMUNIZATION ADMIN: CPT | Performed by: FAMILY MEDICINE

## 2019-07-08 PROCEDURE — 90472 IMMUNIZATION ADMIN EACH ADD: CPT | Performed by: FAMILY MEDICINE

## 2019-07-08 RX ORDER — GUANFACINE 2 MG/1
2 TABLET ORAL
Status: ON HOLD | COMMUNITY
Start: 2019-06-12 | End: 2021-08-16

## 2019-07-08 NOTE — PROGRESS NOTES
This medical record contains text that has been entered with the assistance of computer voice recognition and dictation software.  Therefore, it may contain unintended errors in text, spelling, punctuation, or grammar        Chief Complaint   Patient presents with   • Immunizations     pneumo and Tdap        Santo Hernandez is a 23 y.o. male here evaluation and management of:     He and dad both have horrible insomnia  Dad lives with him during the week   Anxiety too   Sleep apnea too        He grew up [place] area  Lived in [place] mostly   Moved to [place] for a couple years when his grandma was on hospice  New job in computer hardware repair x 6 mo     Today vaccine counseling           Current Outpatient Prescriptions   Medication Sig Dispense Refill   • guanFACINE (TENEX) 1 MG Tab Take 1 mg by mouth every bedtime.     • sertraline (ZOLOFT) 50 MG Tab Take 50 mg by mouth every day.     • albuterol 108 (90 Base) MCG/ACT Aero Soln inhalation aerosol Inhale 2 Puffs by mouth every 6 hours as needed for Shortness of Breath. 8.5 g 3   • fluocinonide (LIDEX) 0.05 % Ointment Apply  to affected area(s) 2 times a day.       No current facility-administered medications for this visit.      Patient Active Problem List    Diagnosis Date Noted   • Vaccine counseling 07/08/2019   • Other insomnia 01/24/2019   • Anxiety 01/24/2019   • BOB (obstructive sleep apnea) 10/12/2018   • B12 deficiency 10/12/2018   • GERD (gastroesophageal reflux disease) 07/05/2011   • Intermittent asthma 10/14/2008     No past surgical history on file.   Social History   Substance Use Topics   • Smoking status: Former Smoker   • Smokeless tobacco: Never Used   • Alcohol use Yes      Comment: one or two a week     No family history on file.        ROS  No n/v  all review of system completed and negative except for those listed above     Objective:     /60 (BP Location: Right arm, Patient Position: Sitting, BP Cuff Size: Adult)   Pulse 90   Temp 37 °C (98.6  "°F) (Temporal)   Resp 14   Ht 1.803 m (5' 11\")   Wt 84.4 kg (186 lb)   SpO2 95%  Body mass index is 25.94 kg/m².  Physical Exam:        GEN: comfortable, alert and oriented, well nourished, well developed, in no apparent distress   HEENT: NCAT, eyes: pupils equal and reactive, sclera white, EOMIT, good dentition  HEART: limbs warm and well perfused, regular rate, no JVD, no lower extremity edema  LUNGS: speaking in full sentences, not in apparent respiratory distress, no audible wheezes  MSK: normal tone and bulk, no swelling of the joints, gait steady and normal           Assessment and Plan:   The following treatment plan was discussed        Problem List Items Addressed This Visit     Vaccine counseling     15+ min prev health counseling   We will check for varicella immunity   Pneumonia because of his h/o asthma  Also tdap     All questions answered               Relevant Orders    TDAP VACCINE =>8YO IM    Pneumococcal Polysaccharide Vaccine 23-Valent =>1yo SQ/IM    VARICELLA ZOSTER IGG AB                Instructed to follow up if symptoms worsen or fail to improve, ER/UC precautions discussed as well    Kristina Alva MD  [facility], Family Medicine   [address] [place] [address] [address]   [place] [address]  Phone: [phone]             "

## 2019-07-08 NOTE — ASSESSMENT & PLAN NOTE
15+ min prev health counseling   We will check for varicella immunity   Pneumonia because of his h/o asthma  Also tdap     All questions answered

## 2019-07-10 ENCOUNTER — HOSPITAL ENCOUNTER (OUTPATIENT)
Dept: LAB | Facility: MEDICAL CENTER | Age: 24
End: 2019-07-10
Attending: FAMILY MEDICINE
Payer: COMMERCIAL

## 2019-07-10 DIAGNOSIS — Z71.85 VACCINE COUNSELING: ICD-10-CM

## 2019-07-10 PROCEDURE — 86787 VARICELLA-ZOSTER ANTIBODY: CPT

## 2019-07-10 PROCEDURE — 36415 COLL VENOUS BLD VENIPUNCTURE: CPT

## 2019-07-12 LAB — VZV IGG SER IA-ACNC: 1.49

## 2021-02-16 ENCOUNTER — OFFICE VISIT (OUTPATIENT)
Dept: URGENT CARE | Facility: PHYSICIAN GROUP | Age: 26
End: 2021-02-16
Payer: COMMERCIAL

## 2021-02-16 ENCOUNTER — NURSE TRIAGE (OUTPATIENT)
Dept: HEALTH INFORMATION MANAGEMENT | Facility: OTHER | Age: 26
End: 2021-02-16

## 2021-02-16 ENCOUNTER — HOSPITAL ENCOUNTER (OUTPATIENT)
Dept: LAB | Facility: MEDICAL CENTER | Age: 26
End: 2021-02-16
Attending: NURSE PRACTITIONER
Payer: COMMERCIAL

## 2021-02-16 VITALS
WEIGHT: 163 LBS | OXYGEN SATURATION: 95 % | DIASTOLIC BLOOD PRESSURE: 82 MMHG | HEART RATE: 97 BPM | BODY MASS INDEX: 22.82 KG/M2 | SYSTOLIC BLOOD PRESSURE: 130 MMHG | TEMPERATURE: 97.7 F | RESPIRATION RATE: 16 BRPM | HEIGHT: 71 IN

## 2021-02-16 DIAGNOSIS — J02.9 SORE THROAT: ICD-10-CM

## 2021-02-16 DIAGNOSIS — E86.0 DEHYDRATION: ICD-10-CM

## 2021-02-16 DIAGNOSIS — R09.A2 SENSATION OF LUMP IN THROAT: ICD-10-CM

## 2021-02-16 DIAGNOSIS — Z79.899 HIGH RISK MEDICATION USE: ICD-10-CM

## 2021-02-16 DIAGNOSIS — G47.09 OTHER INSOMNIA: ICD-10-CM

## 2021-02-16 DIAGNOSIS — F41.9 ANXIETY: ICD-10-CM

## 2021-02-16 DIAGNOSIS — K21.9 GASTROESOPHAGEAL REFLUX DISEASE, UNSPECIFIED WHETHER ESOPHAGITIS PRESENT: ICD-10-CM

## 2021-02-16 LAB
ALBUMIN SERPL BCP-MCNC: 4.6 G/DL (ref 3.2–4.9)
ALBUMIN/GLOB SERPL: 2.1 G/DL
ALP SERPL-CCNC: 71 U/L (ref 30–99)
ALT SERPL-CCNC: 22 U/L (ref 2–50)
ANION GAP SERPL CALC-SCNC: 10 MMOL/L (ref 7–16)
AST SERPL-CCNC: 18 U/L (ref 12–45)
BASOPHILS # BLD AUTO: 0.7 % (ref 0–1.8)
BASOPHILS # BLD: 0.04 K/UL (ref 0–0.12)
BILIRUB SERPL-MCNC: 0.4 MG/DL (ref 0.1–1.5)
BUN SERPL-MCNC: 8 MG/DL (ref 8–22)
CALCIUM SERPL-MCNC: 10.1 MG/DL (ref 8.5–10.5)
CHLORIDE SERPL-SCNC: 101 MMOL/L (ref 96–112)
CO2 SERPL-SCNC: 26 MMOL/L (ref 20–33)
CREAT SERPL-MCNC: 0.95 MG/DL (ref 0.5–1.4)
EOSINOPHIL # BLD AUTO: 0.07 K/UL (ref 0–0.51)
EOSINOPHIL NFR BLD: 1.2 % (ref 0–6.9)
ERYTHROCYTE [DISTWIDTH] IN BLOOD BY AUTOMATED COUNT: 41.5 FL (ref 35.9–50)
FASTING STATUS PATIENT QL REPORTED: NORMAL
GLOBULIN SER CALC-MCNC: 2.2 G/DL (ref 1.9–3.5)
GLUCOSE SERPL-MCNC: 104 MG/DL (ref 65–99)
HCT VFR BLD AUTO: 48.6 % (ref 42–52)
HGB BLD-MCNC: 16.4 G/DL (ref 14–18)
IMM GRANULOCYTES # BLD AUTO: 0.02 K/UL (ref 0–0.11)
IMM GRANULOCYTES NFR BLD AUTO: 0.3 % (ref 0–0.9)
INT CON NEG: NORMAL
INT CON POS: NORMAL
LYMPHOCYTES # BLD AUTO: 1.66 K/UL (ref 1–4.8)
LYMPHOCYTES NFR BLD: 27.5 % (ref 22–41)
MCH RBC QN AUTO: 31.1 PG (ref 27–33)
MCHC RBC AUTO-ENTMCNC: 33.7 G/DL (ref 33.7–35.3)
MCV RBC AUTO: 92.2 FL (ref 81.4–97.8)
MONOCYTES # BLD AUTO: 0.5 K/UL (ref 0–0.85)
MONOCYTES NFR BLD AUTO: 8.3 % (ref 0–13.4)
NEUTROPHILS # BLD AUTO: 3.75 K/UL (ref 1.82–7.42)
NEUTROPHILS NFR BLD: 62 % (ref 44–72)
NRBC # BLD AUTO: 0 K/UL
NRBC BLD-RTO: 0 /100 WBC
PLATELET # BLD AUTO: 357 K/UL (ref 164–446)
PMV BLD AUTO: 10 FL (ref 9–12.9)
POTASSIUM SERPL-SCNC: 3.9 MMOL/L (ref 3.6–5.5)
PROT SERPL-MCNC: 6.8 G/DL (ref 6–8.2)
RBC # BLD AUTO: 5.27 M/UL (ref 4.7–6.1)
S PYO AG THROAT QL: NEGATIVE
SODIUM SERPL-SCNC: 137 MMOL/L (ref 135–145)
WBC # BLD AUTO: 6 K/UL (ref 4.8–10.8)

## 2021-02-16 PROCEDURE — 87880 STREP A ASSAY W/OPTIC: CPT | Performed by: NURSE PRACTITIONER

## 2021-02-16 PROCEDURE — 36415 COLL VENOUS BLD VENIPUNCTURE: CPT

## 2021-02-16 PROCEDURE — 85025 COMPLETE CBC W/AUTO DIFF WBC: CPT

## 2021-02-16 PROCEDURE — 99214 OFFICE O/P EST MOD 30 MIN: CPT | Performed by: NURSE PRACTITIONER

## 2021-02-16 PROCEDURE — 80053 COMPREHEN METABOLIC PANEL: CPT

## 2021-02-16 RX ORDER — LAMOTRIGINE 250 MG/1
TABLET, EXTENDED RELEASE ORAL
COMMUNITY
Start: 2021-02-08 | End: 2022-02-09

## 2021-02-16 ASSESSMENT — FIBROSIS 4 INDEX: FIB4 SCORE: 0.34

## 2021-02-16 NOTE — TELEPHONE ENCOUNTER
Regarding: WEAKNESS AND LUMP IN NECK  ----- Message from Jose Basilio sent at 2/16/2021  9:33 AM PST -----  PT'S FATHER STATES HIS SON IS EXPERIENCING WEAKNESS ON THE LEFT SIDE OF HIS BODY, LUMP IN LEFT SIDE OF THROAT, STARTED 2 DAYS AGO

## 2021-02-16 NOTE — TELEPHONE ENCOUNTER
"Pt experiencing weakness on right side of body and lump in throat x 3 days. No other sx. Pt booked and advised to go to UC or ER depending on sx development today. Father will take pt in.     Reason for Disposition  • [place] palsy suspected (i.e., weakness only one side of the face, developing over hours to days, no other symptoms)    Additional Information  • Weakness of the face, arm or leg on one side of the body  • Negative: Difficult to awaken or acting confused (e.g., disoriented, slurred speech)  • Negative: New neurologic deficit that is present NOW, sudden onset of ANY of the following: * Weakness of the face, arm, or leg on one side of the body * Numbness of the face, arm, or leg on one side of the body * Loss of speech or garbled speech  • Negative: Sounds like a life-threatening emergency to the triager  • Negative: Confusion, disorientation, or hallucinations is the main symptom  • Negative: Dizziness is the main symptom  • Negative: Followed a head injury within last 3 days  • Negative: Headache (with neurologic deficit)  • Negative: Unable to urinate (or only a few drops) and bladder feels very full  • Negative: Loss of control of bowel or bladder (i.e., incontinence) of new onset  • Negative: Back pain with numbness (loss of sensation) in groin or rectal area  • Negative: Patient sounds very sick or weak to the triager  • Negative: Neurologic deficit that was brief (now gone), ANY of the following: * Weakness of the face, arm, or leg on one side of the body * Numbness of the face, arm, or leg on one side of the body * Loss of speech or garbled speech  • Negative: Neurologic deficit of gradual onset, ANY of the following: * Weakness of the face, arm, or leg on one side of the body * Numbness of the face, arm, or leg on one side of the body * Loss of speech or garbled speech    Answer Assessment - Initial Assessment Questions  1. SYMPTOM: \"What is the main symptom you are concerned about?\" (e.g., " "weakness, numbness)      WEAKNESS ON ONE SIDE   2. ONSET: \"When did this start?\" (minutes, hours, days; while sleeping)      2-3 DAYS  3. LAST NORMAL: \"When was the last time you were normal (no symptoms)?\"      3 DAYS AGO  4. PATTERN \"Does this come and go, or has it been constant since it started?\"  \"Is it present now?\"      PROGRESSION3  5. CARDIAC SYMPTOMS: \"Have you had any of the following symptoms: chest pain, difficulty breathing, palpitations?\"      NO  6. NEUROLOGIC SYMPTOMS: \"Have you had any of the following symptoms: headache, dizziness, vision loss, double vision, changes in speech, unsteady on your feet?\"      NO  7. OTHER SYMPTOMS: \"Do you have any other symptoms?\"      NO  8. PREGNANCY: \"Is there any chance you are pregnant?\" \"When was your last menstrual period?\"      NO    Protocols used: NEUROLOGIC DEFICIT-A-OH, WEAKNESS (GENERALIZED) AND FATIGUE-A-OH    "

## 2021-02-17 ENCOUNTER — HOSPITAL ENCOUNTER (EMERGENCY)
Facility: MEDICAL CENTER | Age: 26
End: 2021-02-17
Attending: EMERGENCY MEDICINE
Payer: COMMERCIAL

## 2021-02-17 ENCOUNTER — APPOINTMENT (OUTPATIENT)
Dept: RADIOLOGY | Facility: MEDICAL CENTER | Age: 26
End: 2021-02-17
Attending: EMERGENCY MEDICINE
Payer: COMMERCIAL

## 2021-02-17 VITALS
RESPIRATION RATE: 14 BRPM | BODY MASS INDEX: 22.65 KG/M2 | HEART RATE: 82 BPM | WEIGHT: 161.82 LBS | TEMPERATURE: 97.1 F | SYSTOLIC BLOOD PRESSURE: 116 MMHG | DIASTOLIC BLOOD PRESSURE: 60 MMHG | HEIGHT: 71 IN | OXYGEN SATURATION: 99 %

## 2021-02-17 DIAGNOSIS — R59.1 LYMPHADENOPATHY: ICD-10-CM

## 2021-02-17 DIAGNOSIS — R53.1 WEAKNESS: ICD-10-CM

## 2021-02-17 LAB
ALBUMIN SERPL BCP-MCNC: 4.4 G/DL (ref 3.2–4.9)
ALBUMIN/GLOB SERPL: 1.9 G/DL
ALP SERPL-CCNC: 75 U/L (ref 30–99)
ALT SERPL-CCNC: 20 U/L (ref 2–50)
ANION GAP SERPL CALC-SCNC: 11 MMOL/L (ref 7–16)
AST SERPL-CCNC: 20 U/L (ref 12–45)
BASOPHILS # BLD AUTO: 0.7 % (ref 0–1.8)
BASOPHILS # BLD: 0.05 K/UL (ref 0–0.12)
BILIRUB SERPL-MCNC: 0.3 MG/DL (ref 0.1–1.5)
BUN SERPL-MCNC: 6 MG/DL (ref 8–22)
CALCIUM SERPL-MCNC: 9.3 MG/DL (ref 8.5–10.5)
CHLORIDE SERPL-SCNC: 103 MMOL/L (ref 96–112)
CO2 SERPL-SCNC: 25 MMOL/L (ref 20–33)
CREAT SERPL-MCNC: 0.94 MG/DL (ref 0.5–1.4)
EKG IMPRESSION: NORMAL
EOSINOPHIL # BLD AUTO: 0.12 K/UL (ref 0–0.51)
EOSINOPHIL NFR BLD: 1.6 % (ref 0–6.9)
ERYTHROCYTE [DISTWIDTH] IN BLOOD BY AUTOMATED COUNT: 41.6 FL (ref 35.9–50)
GLOBULIN SER CALC-MCNC: 2.3 G/DL (ref 1.9–3.5)
GLUCOSE SERPL-MCNC: 95 MG/DL (ref 65–99)
HCT VFR BLD AUTO: 46.2 % (ref 42–52)
HGB BLD-MCNC: 15.6 G/DL (ref 14–18)
IMM GRANULOCYTES # BLD AUTO: 0.02 K/UL (ref 0–0.11)
IMM GRANULOCYTES NFR BLD AUTO: 0.3 % (ref 0–0.9)
LYMPHOCYTES # BLD AUTO: 2.67 K/UL (ref 1–4.8)
LYMPHOCYTES NFR BLD: 36.5 % (ref 22–41)
MCH RBC QN AUTO: 31.3 PG (ref 27–33)
MCHC RBC AUTO-ENTMCNC: 33.8 G/DL (ref 33.7–35.3)
MCV RBC AUTO: 92.8 FL (ref 81.4–97.8)
MONOCYTES # BLD AUTO: 0.54 K/UL (ref 0–0.85)
MONOCYTES NFR BLD AUTO: 7.4 % (ref 0–13.4)
NEUTROPHILS # BLD AUTO: 3.91 K/UL (ref 1.82–7.42)
NEUTROPHILS NFR BLD: 53.5 % (ref 44–72)
NRBC # BLD AUTO: 0 K/UL
NRBC BLD-RTO: 0 /100 WBC
PLATELET # BLD AUTO: 332 K/UL (ref 164–446)
PMV BLD AUTO: 9.4 FL (ref 9–12.9)
POTASSIUM SERPL-SCNC: 3.9 MMOL/L (ref 3.6–5.5)
PROT SERPL-MCNC: 6.7 G/DL (ref 6–8.2)
RBC # BLD AUTO: 4.98 M/UL (ref 4.7–6.1)
SARS-COV-2 RNA RESP QL NAA+PROBE: NOTDETECTED
SODIUM SERPL-SCNC: 139 MMOL/L (ref 135–145)
SPECIMEN SOURCE: NORMAL
WBC # BLD AUTO: 7.3 K/UL (ref 4.8–10.8)

## 2021-02-17 PROCEDURE — 99284 EMERGENCY DEPT VISIT MOD MDM: CPT

## 2021-02-17 PROCEDURE — 93005 ELECTROCARDIOGRAM TRACING: CPT | Performed by: EMERGENCY MEDICINE

## 2021-02-17 PROCEDURE — U0005 INFEC AGEN DETEC AMPLI PROBE: HCPCS

## 2021-02-17 PROCEDURE — 85025 COMPLETE CBC W/AUTO DIFF WBC: CPT

## 2021-02-17 PROCEDURE — 70496 CT ANGIOGRAPHY HEAD: CPT

## 2021-02-17 PROCEDURE — 700117 HCHG RX CONTRAST REV CODE 255: Performed by: EMERGENCY MEDICINE

## 2021-02-17 PROCEDURE — 70498 CT ANGIOGRAPHY NECK: CPT

## 2021-02-17 PROCEDURE — U0003 INFECTIOUS AGENT DETECTION BY NUCLEIC ACID (DNA OR RNA); SEVERE ACUTE RESPIRATORY SYNDROME CORONAVIRUS 2 (SARS-COV-2) (CORONAVIRUS DISEASE [COVID-19]), AMPLIFIED PROBE TECHNIQUE, MAKING USE OF HIGH THROUGHPUT TECHNOLOGIES AS DESCRIBED BY CMS-2020-01-R: HCPCS

## 2021-02-17 PROCEDURE — 80053 COMPREHEN METABOLIC PANEL: CPT

## 2021-02-17 PROCEDURE — 36415 COLL VENOUS BLD VENIPUNCTURE: CPT

## 2021-02-17 RX ORDER — AMOXICILLIN 500 MG/1
500 CAPSULE ORAL 3 TIMES DAILY
Qty: 21 CAPSULE | Refills: 0 | Status: SHIPPED
Start: 2021-02-17 | End: 2021-03-02

## 2021-02-17 RX ADMIN — IOHEXOL 80 ML: 350 INJECTION, SOLUTION INTRAVENOUS at 13:45

## 2021-02-17 ASSESSMENT — FIBROSIS 4 INDEX: FIB4 SCORE: 0.29

## 2021-02-17 NOTE — ED NOTES
Discharge instructions given to pt. Prescriptions sent to pt's pharmacy. Pt educated, verbalizes understanding. All belongings accounted for. Pt ambulated out of ED with steady gait to go home with family at side. PIV removed and dressing applied.

## 2021-02-17 NOTE — ED PROVIDER NOTES
"ED Provider  Scribed for Edward Regalado D.O. by Nidhi Blanco. 2/17/2021  12:30 PM    Means of arrival: Walk in  History obtained from: Patient   History limited by: None    CHIEF COMPLAINT  Chief Complaint   Patient presents with    Lump     in left side of neck since thursday x 4 days    Fatigue     my body just feels fatigue just left \"my left side\" x 4 days       HPI  Santo Hernandez is a 25 y.o. male who presents to the ED for ongoing left sided anterior neck pain with an onset of 4 days. The patient describes he feels there is something in his throat, but he is unsure what this may be. Exacerbating factors include swallowing and eating. No exacerbating or alleviating factors were identified. He reports associated pain with swallowing. He denies any associated fever, sore throat, dental pain, cough, or nasal congestion. The patient has not experienced similar symptoms in the past. His psychiatrist recommended he should go to the ED for further evaluation.    The patient is also complaining of left sided upper and lower extremity weakness with an onset of 4 days. The patient describes he has difficultly performing fine motor tasks such as twisting off a toothpaste cap or typing on a keyboard. He reports associated fatigue. He denies any associated difficulty walking, diarrhea, or vomiting. No alleviating or exacerbating factors were identified.     REVIEW OF SYSTEMS  See HPI for further details. All other systems are negative.     PAST MEDICAL HISTORY   has a past medical history of Asthma, B12 deficiency (10/12/2018), Eczema, GERD (gastroesophageal reflux disease), and BOB (obstructive sleep apnea) (10/12/2018).    SOCIAL HISTORY  Social History     Tobacco Use    Smoking status: Former Smoker    Smokeless tobacco: Never Used   Substance and Sexual Activity    Alcohol use: Yes     Comment: one or two a week    Drug use: Yes     Types: Marijuana, Inhaled     Comment: THC    Sexual activity: None noted " "      SURGICAL HISTORY  patient denies any surgical history    CURRENT MEDICATIONS  Home Medications       Reviewed by Michaela Alatorre R.N. (Registered Nurse) on 02/17/21 at 1046  Med List Status: Partial     Medication Last Dose Status   albuterol 108 (90 Base) MCG/ACT Aero Soln inhalation aerosol taking Active   fluocinonide (LIDEX) 0.05 % Ointment taking Active   guanFACINE (TENEX) 1 MG Tab taking Active   LamoTRIgine 250 MG TABLET SR 24 HR taking Active   sertraline (ZOLOFT) 50 MG Tab not taking Active                    ALLERGIES  Allergies   Allergen Reactions    Ibuprofen Swelling       PHYSICAL EXAM  VITAL SIGNS: /69   Pulse 88   Temp 36.2 °C (97.1 °F) (Temporal)   Resp 14   Ht 1.803 m (5' 11\")   Wt 73.4 kg (161 lb 13.1 oz)   SpO2 96%   BMI 22.57 kg/m²   Constitutional: Alert in no apparent distress.  HENT: No signs of trauma, mucous membranes are moist  Eyes: Conjunctiva normal, Non-icteric.   Neck: Normal range of motion, No tenderness, Supple.  Lymphatic: Left anterior upper cervical lymph node palpable  Cardiovascular: Regular rate and rhythm, no murmurs.   Thorax & Lungs: Normal breath sounds, No respiratory distress, No wheezing, No chest tenderness.   Abdomen: Bowel sounds normal, Soft, No tenderness, No masses, No pulsatile masses. No peritoneal signs.  Skin: Warm, Dry, normal color.   Back: No bony tenderness, No CVA tenderness.   Extremities: No edema, No tenderness, No cyanosis  Musculoskeletal: Good range of motion in all major joints. No tenderness to palpation or major deformities noted.   Neurologic: Alert and oriented x4, Normal motor function, Normal sensory function, No focal deficits noted. Strength and coordination of upper and lower extremities normal, steady normal gait.  Psychiatric: Affect normal, Judgment normal, Mood normal.       DIAGNOSTIC STUDIES / PROCEDURES    EKG  12 Lead EKG interpreted by me shown below.     LABS  Results for orders placed or performed during the " hospital encounter of 02/17/21   CBC WITH DIFFERENTIAL   Result Value Ref Range    WBC 7.3 4.8 - 10.8 K/uL    RBC 4.98 4.70 - 6.10 M/uL    Hemoglobin 15.6 14.0 - 18.0 g/dL    Hematocrit 46.2 42.0 - 52.0 %    MCV 92.8 81.4 - 97.8 fL    MCH 31.3 27.0 - 33.0 pg    MCHC 33.8 33.7 - 35.3 g/dL    RDW 41.6 35.9 - 50.0 fL    Platelet Count 332 164 - 446 K/uL    MPV 9.4 9.0 - 12.9 fL    Neutrophils-Polys 53.50 44.00 - 72.00 %    Lymphocytes 36.50 22.00 - 41.00 %    Monocytes 7.40 0.00 - 13.40 %    Eosinophils 1.60 0.00 - 6.90 %    Basophils 0.70 0.00 - 1.80 %    Immature Granulocytes 0.30 0.00 - 0.90 %    Nucleated RBC 0.00 /100 WBC    Neutrophils (Absolute) 3.91 1.82 - 7.42 K/uL    Lymphs (Absolute) 2.67 1.00 - 4.80 K/uL    Monos (Absolute) 0.54 0.00 - 0.85 K/uL    Eos (Absolute) 0.12 0.00 - 0.51 K/uL    Baso (Absolute) 0.05 0.00 - 0.12 K/uL    Immature Granulocytes (abs) 0.02 0.00 - 0.11 K/uL    NRBC (Absolute) 0.00 K/uL   COMP METABOLIC PANEL   Result Value Ref Range    Sodium 139 135 - 145 mmol/L    Potassium 3.9 3.6 - 5.5 mmol/L    Chloride 103 96 - 112 mmol/L    Co2 25 20 - 33 mmol/L    Anion Gap 11.0 7.0 - 16.0    Glucose 95 65 - 99 mg/dL    Bun 6 (L) 8 - 22 mg/dL    Creatinine 0.94 0.50 - 1.40 mg/dL    Calcium 9.3 8.5 - 10.5 mg/dL    AST(SGOT) 20 12 - 45 U/L    ALT(SGPT) 20 2 - 50 U/L    Alkaline Phosphatase 75 30 - 99 U/L    Total Bilirubin 0.3 0.1 - 1.5 mg/dL    Albumin 4.4 3.2 - 4.9 g/dL    Total Protein 6.7 6.0 - 8.2 g/dL    Globulin 2.3 1.9 - 3.5 g/dL    A-G Ratio 1.9 g/dL   SARS-CoV-2 PCR (24 hour In-House): Collect NP swab in VTM    Specimen: Respirate   Result Value Ref Range    SARS-CoV-2 Source NP Swab    ESTIMATED GFR   Result Value Ref Range    GFR If African American >60 >60 mL/min/1.73 m 2    GFR If Non African American >60 >60 mL/min/1.73 m 2   EKG (NOW)   Result Value Ref Range    Report       [facility] Emergency Dept.    Test Date:  2021-02-17  Pt Name:    JOHNSON MAYA                   Department: ER  MRN:        [id number]                      Room:       [facility]  Gender:     Male                         Technician: [id number]  :        1995                   Requested By:MIKHAIL FRANCIS  Order #:    [id number]                    Reading MD: MIKHAIL FRANCIS D.O.    Measurements  Intervals                                Axis  Rate:       67                           P:          86  WI:         156                          QRS:        78  QRSD:       88                           T:          61  QT:         380  QTc:        401    Interpretive Statements  SINUS RHYTHM  No previous ECG available for comparison  Electronically Signed On 2021 14:40:27 PST by MIKHAIL FRANCIS D.O.       All labs reviewed by me.    RADIOLOGY  CT-CTA HEAD WITH & W/O-POST PROCESS   Final Result      No thrombosis is seen within the [place].      No acute intracranial abnormality is seen.      CT-CTA NECK WITH & W/O-POST PROCESSING   Final Result      CT angiogram of the neck within normal limits.        The radiologist's interpretations of all radiological studies have been reviewed by me.    Films have been independently by me      COURSE  Pertinent Labs & Imaging studies reviewed. (See chart for details)    12:30 PM - Patient seen and examined at bedside. Discussed plan of care. Ordered for CT-CTA head, CT-CTA neck, EKG, CMP, CBC, and SARS-CoV to evaluate his symptoms.    2:47 PM - Patient was reevaluated at bedside. He is resting comfortably in bed. Discussed lab and radiology results with the patient and informed them they were reassuring. He will be prescribed Amoxicillin 500 mg for his symptoms. The patient will return for new or worsening symptoms and is stable at the time of discharge. Patient is understanding and agreeable with discharge home and will follow up with his PCP.     MEDICAL DECISION MAKING  This is a 25 y.o. male who presents with complaints of left-sided weakness subjectively.   Objectively there is no coordination deficit, and no strength deficit.  CT scans were done shows no signs of stroke.  He does have a palpable lymph node in the left anterior cervical and this is not apparent on the CT.  He will be placed on antibiotics and instructed to follow-up as an outpatient with the primary doctor for follow-up of this lymph node      DISPOSITION:  Patient will be discharged home in stable condition.    FOLLOW UP:  Kristina Alva M.D.  [address] [facility] [place]  [address] [place] [id number]  [phone]    In 1 week      OUTPATIENT MEDICATIONS:  Discharge Medication List as of 2/17/2021  3:11 PM        START taking these medications    Details   amoxicillin (AMOXIL) 500 MG Cap Take 1 capsule by mouth 3 times a day., Disp-21 capsule, R-0, Normal               FINAL IMPRESSION  1. Weakness    2. Lymphadenopathy         Nidhi BIRMINGHAM (Scribe), am scribing for, and in the presence of, Edward Regalado D.O..    Electronically signed by: Nidhi Blanco (Albertinaibe), 2/17/2021    IEdward D.O. personally performed the services described in this documentation, as scribed by Nidhi Blanco in my presence, and it is both accurate and complete.    C    The note accurately reflects work and decisions made by me.  Edward Regalado D.O.  2/17/2021  7:26 PM

## 2021-02-17 NOTE — DISCHARGE INSTRUCTIONS
CT scans are normal, lab tests are normal.  The lump on the neck feels most consistent with the enlarged lymph node.  For that you are being placed on antibiotics.  Covid test has been started, this will take 24 to 48 hours to get results.  [facility]    Please follow-up with your primary care doctor

## 2021-02-19 ASSESSMENT — ENCOUNTER SYMPTOMS
SHORTNESS OF BREATH: 0
WEAKNESS: 0
CONSTIPATION: 0
ORTHOPNEA: 0
EYE PAIN: 0
MYALGIAS: 0
CHILLS: 0
NAUSEA: 0
VOMITING: 0
FEVER: 0
HEADACHES: 0
SORE THROAT: 0
COUGH: 0
NERVOUS/ANXIOUS: 0
ABDOMINAL PAIN: 0
HEARTBURN: 0
DIZZINESS: 0
DIARRHEA: 0

## 2021-02-19 NOTE — PROGRESS NOTES
"Subjective:   Santo Hernandez is a 25 y.o. male who presents for Extremity Weakness (L side, lump in [id number] )       Extremity Weakness  The patient's pertinent negatives include no weakness. Primary symptoms comment: none. This is a new problem. The current episode started in the past 7 days. The neurological problem developed gradually. The problem is unchanged. There was left-sided focality noted. Pertinent negatives include no abdominal pain, chest pain, dizziness, fever, headaches, nausea, shortness of breath or vomiting. (None) Past treatments include nothing. His past medical history is significant for mood changes.   Pharyngitis   This is a new problem. The current episode started in the past 7 days. The problem has been unchanged. The pain is worse on the left side. There has been no fever. The pain is mild. Pertinent negatives include no abdominal pain, congestion, coughing, diarrhea, headaches, shortness of breath or vomiting. Associated symptoms comments: none. He has tried nothing for the symptoms.     Pt presents for evaluation of a new problem, who presents with his father, and collectively they report a history to include a feeling of lump in the left side of this jaw/throat, that increases with eating any type of food, any times of day.  Also states that he feels dehydrated. Denies pain with swallowing, throat pain, or difficulty swallowing and/or speaking.  Denies fever, chills or known ill contacts.    Additionally, he states he feels weak on the left side of his body, and \"has to work\" to makes his body move.  Denies difficulty with gait, recent falls, numbness/ tingling, etc.  After further discussion, has had a recent increase of his Lithium for bipolar disorder.  States he has not been sleeping well either, last night slept 2-3 hours only, and states he feels as though he might be in a manic mode.  Had a panic attack 3 days prior to these symptoms beginning.     Review of Systems " "  Constitutional: Negative for chills, fever and malaise/fatigue.   HENT: Negative for congestion and sore throat.    Eyes: Negative for pain.   Respiratory: Negative for cough and shortness of breath.    Cardiovascular: Negative for chest pain, orthopnea and leg swelling.   Gastrointestinal: Negative for abdominal pain, constipation, diarrhea, heartburn, nausea and vomiting.   Genitourinary: Negative for dysuria.   Musculoskeletal: Positive for extremity weakness. Negative for myalgias.   Skin: Negative for rash.   Neurological: Negative for dizziness, weakness and headaches.   Psychiatric/Behavioral: The patient is not nervous/anxious.    All other systems reviewed and are negative.      MEDS:   Current Outpatient Medications:   •  LamoTRIgine 250 MG TABLET SR 24 HR, , Disp: , Rfl:   •  guanFACINE (TENEX) 1 MG Tab, Take 1 mg by mouth every bedtime., Disp: , Rfl:   •  sertraline (ZOLOFT) 50 MG Tab, Take 50 mg by mouth every day., Disp: , Rfl:   •  albuterol 108 (90 Base) MCG/ACT Aero Soln inhalation aerosol, Inhale 2 Puffs by mouth every 6 hours as needed for Shortness of Breath., Disp: 8.5 g, Rfl: 3  •  amoxicillin (AMOXIL) 500 MG Cap, Take 1 capsule by mouth 3 times a day., Disp: 21 capsule, Rfl: 0  •  fluocinonide (LIDEX) 0.05 % Ointment, Apply  to affected area(s) 2 times a day., Disp: , Rfl:   ALLERGIES:   Allergies   Allergen Reactions   • Ibuprofen Swelling       Patient's PMH, SocHx, SurgHx, FamHx, Drug allergies and medications were reviewed.     Objective:   /82   Pulse 97   Temp 36.5 °C (97.7 °F) (Temporal)   Resp 16   Ht 1.803 m (5' 11\")   Wt 73.9 kg (163 lb)   SpO2 95%   BMI 22.73 kg/m²     Physical Exam  Vitals and nursing note reviewed.   Constitutional:       General: He is awake.      Appearance: Normal appearance. He is well-developed and normal weight.   HENT:      Head: Normocephalic and atraumatic.      Right Ear: Tympanic membrane, ear canal and external ear normal.      Left Ear: " Tympanic membrane, ear canal and external ear normal.      Nose: Nose normal.      Mouth/Throat:      Mouth: Mucous membranes are moist.      Pharynx: Oropharynx is clear.   Eyes:      Extraocular Movements: Extraocular movements intact.      Conjunctiva/sclera: Conjunctivae normal.      Pupils: Pupils are equal, round, and reactive to light.   Neck:      Thyroid: No thyromegaly.      Trachea: Trachea normal.   Cardiovascular:      Rate and Rhythm: Normal rate and regular rhythm.      Pulses: Normal pulses.      Heart sounds: Normal heart sounds, S1 normal and S2 normal.   Pulmonary:      Effort: Pulmonary effort is normal. No respiratory distress.      Breath sounds: Normal breath sounds. No wheezing, rhonchi or rales.   Abdominal:      General: Bowel sounds are normal.      Palpations: Abdomen is soft.   Musculoskeletal:         General: Normal range of motion.      Cervical back: Full passive range of motion without pain, normal range of motion and neck supple.   Lymphadenopathy:      Head:      Left side of head: Tonsillar adenopathy present.      Cervical: No cervical adenopathy.   Skin:     General: Skin is warm and dry.      Capillary Refill: Capillary refill takes less than 2 seconds.   Neurological:      General: No focal deficit present.      Mental Status: He is alert and oriented to person, place, and time.      GCS: GCS eye subscore is 4. GCS verbal subscore is 5. GCS motor subscore is 6.      Cranial Nerves: Cranial nerves are intact.      Sensory: Sensation is intact.      Motor: Motor function is intact.      Coordination: Coordination is intact.      Gait: Gait is intact.      Deep Tendon Reflexes: Reflexes are normal and symmetric.   Psychiatric:         Attention and Perception: Attention and perception normal.         Mood and Affect: Mood normal.         Speech: Speech normal.         Behavior: Behavior normal. Behavior is cooperative.         Thought Content: Thought content normal.          Judgment: Judgment normal.         Assessment/Plan:   Assessment    1. Sensation of lump in throat  - REFERRAL TO ENT    2. Sore throat  - POCT Rapid Strep A  - CBC WITH DIFFERENTIAL; Future  - Comp Metabolic Panel; Future    3. Dehydration  - CBC WITH DIFFERENTIAL; Future  - Comp Metabolic Panel; Future    4. Gastroesophageal reflux disease, unspecified whether esophagitis present  - CBC WITH DIFFERENTIAL; Future  - Comp Metabolic Panel; Future    5. Other insomnia  - CBC WITH DIFFERENTIAL; Future  - Comp Metabolic Panel; Future    6. High risk medication use  - CBC WITH DIFFERENTIAL; Future  - Comp Metabolic Panel; Future    7. Anxiety    Vital signs stable at today's acute urgent care visit. Reviewed test results that were completed in the clinic. Will order labs.  Reassured about lump in throat, is a palpable lymph node, and discussed reasons behind this.  Discussed management options (risks, benefits, and alternatives to treatment).     Advised the patient to follow-up with the primary care provider for recheck, reevaluation, and/or consideration of further management if necessary. Additionally, discussed possible side effects of Lithium, and to talk to his physiatrist about this, and to continue medication as directed until he speaks to them.     Return to urgent care with any worsening symptoms or if there is no improvement in their current condition. Patient requests referral to ENT, though reassured that he is feeling a lymph node.  Red flags discussed and indications to immediately call 911 or present to the Emergency Department.  All questions were encouraged and answered to the patient's satisfaction and understanding, and they agree to the plan of care.     I personally reviewed prior external notes and test results pertinent to today's visit.  I have independently reviewed and interpreted all diagnostics ordered during this urgent care acute visit. Time spent evaluating this patient was a minimum of 30  minutes and includes preparing for visit, counseling/education, exam and evaluation, obtaining history, independent interpretation and ordering lab/test/procedures.      Please note that this dictation was created using voice recognition software. I have made a reasonable attempt to correct obvious errors, but I expect that there are errors of grammar and possibly content that I did not discover before finalizing the note.

## 2021-02-20 ENCOUNTER — HOSPITAL ENCOUNTER (EMERGENCY)
Facility: MEDICAL CENTER | Age: 26
End: 2021-02-20
Attending: EMERGENCY MEDICINE
Payer: COMMERCIAL

## 2021-02-20 ENCOUNTER — APPOINTMENT (OUTPATIENT)
Dept: RADIOLOGY | Facility: MEDICAL CENTER | Age: 26
End: 2021-02-20
Attending: EMERGENCY MEDICINE
Payer: COMMERCIAL

## 2021-02-20 VITALS
WEIGHT: 165 LBS | HEART RATE: 79 BPM | RESPIRATION RATE: 13 BRPM | BODY MASS INDEX: 23.1 KG/M2 | TEMPERATURE: 98.7 F | DIASTOLIC BLOOD PRESSURE: 67 MMHG | OXYGEN SATURATION: 96 % | SYSTOLIC BLOOD PRESSURE: 113 MMHG | HEIGHT: 71 IN

## 2021-02-20 DIAGNOSIS — R53.1 LEFT-SIDED WEAKNESS: ICD-10-CM

## 2021-02-20 LAB
ALBUMIN SERPL BCP-MCNC: 4.5 G/DL (ref 3.2–4.9)
ALBUMIN/GLOB SERPL: 1.8 G/DL
ALP SERPL-CCNC: 73 U/L (ref 30–99)
ALT SERPL-CCNC: 21 U/L (ref 2–50)
ANION GAP SERPL CALC-SCNC: 12 MMOL/L (ref 7–16)
AST SERPL-CCNC: 18 U/L (ref 12–45)
BASOPHILS # BLD AUTO: 0.6 % (ref 0–1.8)
BASOPHILS # BLD: 0.06 K/UL (ref 0–0.12)
BILIRUB SERPL-MCNC: 0.5 MG/DL (ref 0.1–1.5)
BLOOD CULTURE HOLD CXBCH: NORMAL
BUN SERPL-MCNC: 8 MG/DL (ref 8–22)
CALCIUM SERPL-MCNC: 9.8 MG/DL (ref 8.5–10.5)
CHLORIDE SERPL-SCNC: 103 MMOL/L (ref 96–112)
CO2 SERPL-SCNC: 23 MMOL/L (ref 20–33)
CREAT SERPL-MCNC: 0.95 MG/DL (ref 0.5–1.4)
EOSINOPHIL # BLD AUTO: 0.09 K/UL (ref 0–0.51)
EOSINOPHIL NFR BLD: 0.8 % (ref 0–6.9)
ERYTHROCYTE [DISTWIDTH] IN BLOOD BY AUTOMATED COUNT: 40.7 FL (ref 35.9–50)
GLOBULIN SER CALC-MCNC: 2.5 G/DL (ref 1.9–3.5)
GLUCOSE SERPL-MCNC: 99 MG/DL (ref 65–99)
HCT VFR BLD AUTO: 47.8 % (ref 42–52)
HGB BLD-MCNC: 15.9 G/DL (ref 14–18)
IMM GRANULOCYTES # BLD AUTO: 0.03 K/UL (ref 0–0.11)
IMM GRANULOCYTES NFR BLD AUTO: 0.3 % (ref 0–0.9)
LYMPHOCYTES # BLD AUTO: 1.76 K/UL (ref 1–4.8)
LYMPHOCYTES NFR BLD: 16.4 % (ref 22–41)
MCH RBC QN AUTO: 30.4 PG (ref 27–33)
MCHC RBC AUTO-ENTMCNC: 33.3 G/DL (ref 33.7–35.3)
MCV RBC AUTO: 91.4 FL (ref 81.4–97.8)
MONOCYTES # BLD AUTO: 0.65 K/UL (ref 0–0.85)
MONOCYTES NFR BLD AUTO: 6 % (ref 0–13.4)
NEUTROPHILS # BLD AUTO: 8.16 K/UL (ref 1.82–7.42)
NEUTROPHILS NFR BLD: 75.9 % (ref 44–72)
NRBC # BLD AUTO: 0 K/UL
NRBC BLD-RTO: 0 /100 WBC
PLATELET # BLD AUTO: 360 K/UL (ref 164–446)
PMV BLD AUTO: 9.3 FL (ref 9–12.9)
POTASSIUM SERPL-SCNC: 3.6 MMOL/L (ref 3.6–5.5)
PROT SERPL-MCNC: 7 G/DL (ref 6–8.2)
RBC # BLD AUTO: 5.23 M/UL (ref 4.7–6.1)
SODIUM SERPL-SCNC: 138 MMOL/L (ref 135–145)
WBC # BLD AUTO: 10.8 K/UL (ref 4.8–10.8)

## 2021-02-20 PROCEDURE — 99283 EMERGENCY DEPT VISIT LOW MDM: CPT

## 2021-02-20 PROCEDURE — 36415 COLL VENOUS BLD VENIPUNCTURE: CPT

## 2021-02-20 PROCEDURE — 80053 COMPREHEN METABOLIC PANEL: CPT

## 2021-02-20 PROCEDURE — 70551 MRI BRAIN STEM W/O DYE: CPT

## 2021-02-20 PROCEDURE — 85025 COMPLETE CBC W/AUTO DIFF WBC: CPT

## 2021-02-20 ASSESSMENT — FIBROSIS 4 INDEX: FIB4 SCORE: 0.36

## 2021-02-20 NOTE — ED PROVIDER NOTES
"ED Provider Note    CHIEF COMPLAINT  Chief Complaint   Patient presents with   • Weakness       HPI  Santo Hernandez is a 25 y.o. male who presents past medical history significant for asthma, B12 deficiency, eczema, GERD, obstructive sleep apnea, he was here as a patient 3 days ago complaining of left neck pain and had a CTA head and neck that was normal.  He presents now stating that he has had 24 hours of left arm left leg and left facial numbness.  He describes weakness of the left upper and lower extremities.  He denies fever or cough.    REVIEW OF SYSTEMS  See HPI for further details. All other systems are negative.     PAST MEDICAL HISTORY   has a past medical history of Asthma, B12 deficiency (10/12/2018), Eczema, GERD (gastroesophageal reflux disease), and BOB (obstructive sleep apnea) (10/12/2018).    SOCIAL HISTORY  Social History     Tobacco Use   • Smoking status: Former Smoker   • Smokeless tobacco: Never Used   Substance and Sexual Activity   • Alcohol use: Not Currently   • Drug use: Yes     Types: Marijuana, Inhaled     Comment: THC   • Sexual activity: Not on file       SURGICAL HISTORY  patient denies any surgical history    CURRENT MEDICATIONS    Medication Last Dose Status   albuterol 108 (90 Base) MCG/ACT Aero Soln inhalation aerosol taking Active   fluocinonide (LIDEX) 0.05 % Ointment taking Active   guanFACINE (TENEX) 1 MG Tab taking Active   LamoTRIgine 250 MG TABLET SR 24 HR taking Active   sertraline (ZOLOFT) 50 MG Tab not taking Active        ALLERGIES  Allergies   Allergen Reactions   • Ibuprofen Swelling       PHYSICAL EXAM  VITAL SIGNS: /69   Pulse 75   Temp 36.7 °C (98.1 °F) (Temporal)   Resp 13   Ht 1.803 m (5' 11\")   Wt 74.8 kg (165 lb)   SpO2 95%   BMI 23.01 kg/m²  @[place][[id number]::@   Pulse ox interpretation: I interpret this pulse ox as normal.  Constitutional: Alert in no apparent distress.  HENT: No signs of trauma, Bilateral external ears normal, Nose normal. "   Eyes: Pupils are equal and reactive, Conjunctiva normal, Non-icteric.   Neck: Normal range of motion, No tenderness, Supple, No stridor.   Lymphatic: No lymphadenopathy noted.   Cardiovascular: Regular rate and rhythm, no murmurs.   Thorax & Lungs: Normal breath sounds, No respiratory distress, No wheezing, No chest tenderness.   Abdomen: Bowel sounds normal, Soft, No tenderness, No masses, No pulsatile masses. No peritoneal signs.  Skin: Warm, Dry, No erythema, No rash.   Back: No bony tenderness, No CVA tenderness.   Extremities: Intact distal pulses, No edema, No tenderness, No cyanosis.  Musculoskeletal: Good range of motion in all major joints. No tenderness to palpation or major deformities noted.   Neurologic: Alert , Normal motor function, Normal sensory function, No focal deficits noted.   Psychiatric: Affect normal, Judgment normal, Mood normal.       DIAGNOSTIC STUDIES / PROCEDURES        LABS  Labs Reviewed   CBC WITH DIFFERENTIAL - Abnormal; Notable for the following components:       Result Value    MCHC 33.3 (*)     Neutrophils-Polys 75.90 (*)     Lymphocytes 16.40 (*)     Neutrophils (Absolute) 8.16 (*)     All other components within normal limits   COMP METABOLIC PANEL   BLOOD CULTURE,HOLD   ESTIMATED GFR         RADIOLOGY  MR-BRAIN-W/O   Final Result      MRI of the brain without contrast within normal limits.              COURSE & MEDICAL DECISION MAKING  Pertinent Labs & Imaging studies reviewed. (See chart for details)    Differential diagnosis: CVA, TIA      MRI was ordered, the patient reports he has a VSD cardiac defect, this could lead to embolic stroke.    Patient's labs and MRI are unremarkable.  I have spoke with the patient, he will follow-up with the neurologist on-call and return for any worsening symptoms.  He will follow-up with his primary care doctor as well.  At this time he does not appear to have any encephalopathy, or signs of infection.     The patient will return for new  or worsening symptoms and is stable at the time of discharge.    The patient is referred to a primary physician for blood pressure management, diabetic screening, and for all other preventative health concerns.      DISPOSITION:  Patient will be discharged home in stable condition.    FOLLOW UP:  [facility], Emergency Dept  [address] [facility] [address] [phone]    If symptoms worsen    Kristina Alva M.D.  [address]  [address] [place] [address]  [phone]      As needed    [facility] [address] [address] [phone]    call for follow up      OUTPATIENT MEDICATIONS:  New Prescriptions    No medications on file        The patient will return for worsening symptoms and is stable at the time of discharge. The patient verbalizes understanding and will comply.    FINAL IMPRESSION  1. Left-sided weakness                Electronically signed by: Good Lozano M.D., 2/20/2021 2:01 PM

## 2021-02-20 NOTE — ED TRIAGE NOTES
Left arm and leg weakness x 5 days, left sided facial numbness started today. Ambulatory with steady gait, no limit in arom to all extremities. Speaking clearly in full sentences, no facial droop or unilateral deficits noted in triage

## 2021-02-20 NOTE — ED NOTES
Assist RN: Patient to red 8 from triage. Changed to gown, placed on monitor, flagged to be seen. Patient states he has had 5 days of generalized weakness and developed mild nonspecific left-sided numbness today. He had a work up at time of last visit and was essentially diagnosed with a viral illness. Nonfocal neurologic exam at this time. IV established, labs collected and sent. No additional needs per patient or father at this time.

## 2021-02-21 NOTE — DISCHARGE INSTRUCTIONS
Weakness  Weakness is a lack of strength. You may feel weak all over your body (generalized), or you may feel weak in one specific part of your body (focal). Common causes of weakness include:  · Infection and immune system disorders.  · Physical exhaustion.  · Internal bleeding or other blood loss that results in a lack of red blood cells (anemia).  · Dehydration.  · An imbalance in mineral (electrolyte) levels, such as potassium.  · Heart disease, circulation problems, or stroke.  Other causes include:  · Some medicines or cancer treatment.  · Stress, anxiety, or depression.  · Nervous system disorders.  · Thyroid disorders.  · Loss of muscle strength because of age or inactivity.  · Poor sleep quality or sleep disorders.  The cause of your weakness may not be known. Some causes of weakness can be serious, so it is important to see your health care provider.  Follow these instructions at home:  Activity  · Rest as needed.  · Try to get enough sleep. Most adults need 7-8 hours of quality sleep each night. Talk to your health care provider about how much sleep you need each night.  · Do exercises, such as arm curls and leg raises, for 30 minutes at least 2 days a week or as told by your health care provider. This helps build muscle strength.  · Consider working with a physical therapist or  who can develop an exercise plan to help you gain muscle strength.  General instructions    · Take over-the-counter and prescription medicines only as told by your health care provider.  · Eat a healthy, well-balanced diet. This includes:  ? Proteins to build muscles, such as lean meats and fish.  ? Fresh fruits and vegetables.  ? Carbohydrates to boost energy, such as whole grains.  · Drink enough fluid to keep your urine pale yellow.  · Keep all follow-up visits as told by your health care provider. This is important.  Contact a health care provider if your weakness:  · Does not improve or gets worse.  · Affects your  ability to think clearly.  · Affects your ability to do your normal daily activities.  Get help right away if you:  · Develop sudden weakness, especially on one side of your face or body.  · Have chest pain.  · Have trouble breathing or shortness of breath.  · Have problems with your vision.  · Have trouble talking or swallowing.  · Have trouble standing or walking.  · Are light-headed or lose consciousness.  Summary  · Weakness is a lack of strength. You may feel weak all over your body or just in one specific part of your body.  · Weakness can be caused by a variety of things. In some cases, the cause may be unknown.  · Rest as needed, and try to get enough sleep. Most adults need 7-8 hours of quality sleep each night.  · Eat a healthy, well-balanced diet.  This information is not intended to replace advice given to you by your health care provider. Make sure you discuss any questions you have with your health care provider.  Document Released: 12/18/2006 Document Revised: 07/24/2019 Document Reviewed: 07/24/2019  Elsevier Patient Education © 2020 [employer]

## 2021-02-23 ENCOUNTER — OFFICE VISIT (OUTPATIENT)
Dept: MEDICAL GROUP | Facility: MEDICAL CENTER | Age: 26
End: 2021-02-23
Payer: COMMERCIAL

## 2021-02-23 VITALS
BODY MASS INDEX: 22.68 KG/M2 | WEIGHT: 162 LBS | DIASTOLIC BLOOD PRESSURE: 54 MMHG | SYSTOLIC BLOOD PRESSURE: 112 MMHG | TEMPERATURE: 99.8 F | HEIGHT: 71 IN | OXYGEN SATURATION: 96 % | HEART RATE: 95 BPM | RESPIRATION RATE: 12 BRPM

## 2021-02-23 DIAGNOSIS — Q21.0 VSD (VENTRICULAR SEPTAL DEFECT): ICD-10-CM

## 2021-02-23 DIAGNOSIS — R53.1 WEAKNESS: ICD-10-CM

## 2021-02-23 PROCEDURE — 99214 OFFICE O/P EST MOD 30 MIN: CPT | Performed by: FAMILY MEDICINE

## 2021-02-23 ASSESSMENT — FIBROSIS 4 INDEX: FIB4 SCORE: 0.29

## 2021-02-23 NOTE — ASSESSMENT & PLAN NOTE
Patient describes about a week long history of left upper extremity and left lower extremity subjective weakness associated with a lump on the left side of his throat that worsens when he eats.  He has been to the urgent care once and emergency department twice for these issues.  He has had a work-up including labs, CT angiogram of the head and neck and MRI of the brain, all of which have been quite reassuring.  Today, he continues to complain of these issues.  He also states that at one point he had numbness and tingling on the left side of his face that has since resolved.  He states that he is currently taking Lamictal for his bipolar disorder and shortly after he started having these issues, started weaning off of the Lamictal per his psychiatrist.  He reports to me history of a ventricular septal defect which his past neurologist said did not require repair.

## 2021-02-23 NOTE — PROGRESS NOTES
[facility]  Progress Note  Established Patient    Subjective:   Santo Hernandez is a 25 y.o. male here today with a chief complaint of weakness. The patient is accompanied by his father, all of us are masked.     Weakness  Patient describes about a week long history of left upper extremity and left lower extremity subjective weakness associated with a lump on the left side of his throat that worsens when he eats.  He has been to the urgent care once and emergency department twice for these issues.  He has had a work-up including labs, CT angiogram of the head and neck and MRI of the brain, all of which have been quite reassuring.  Today, he continues to complain of these issues.  He also states that at one point he had numbness and tingling on the left side of his face that has since resolved.  He states that he is currently taking Lamictal for his bipolar disorder and shortly after he started having these issues, started weaning off of the Lamictal per his psychiatrist.  He reports to me history of a ventricular septal defect which his past neurologist said did not require repair.    VSD (ventricular septal defect)  By report.      Current Outpatient Medications on File Prior to Visit   Medication Sig Dispense Refill   • amoxicillin (AMOXIL) 500 MG Cap Take 1 capsule by mouth 3 times a day. 21 capsule 0   • LamoTRIgine 250 MG TABLET SR 24 HR      • guanFACINE (TENEX) 1 MG Tab Take 1 mg by mouth every bedtime.     • sertraline (ZOLOFT) 50 MG Tab Take 50 mg by mouth every day.     • albuterol 108 (90 Base) MCG/ACT Aero Soln inhalation aerosol Inhale 2 Puffs by mouth every 6 hours as needed for Shortness of Breath. 8.5 g 3   • fluocinonide (LIDEX) 0.05 % Ointment Apply  to affected area(s) 2 times a day.       No current facility-administered medications on file prior to visit.          Objective:     Vitals:    02/23/21 1251   BP: 112/54   BP Location: Left arm   Patient Position: Sitting   BP Cuff  "Size: Adult   Pulse: 95   Resp: 12   Temp: 37.7 °C (99.8 °F)   TempSrc: Temporal   SpO2: 96%   Weight: 73.5 kg (162 lb)   Height: 1.803 m (5' 11\")       Physical Exam:  General: alert in no apparent distress.   Neuro: Cranial nerves II through XII intact, finger-nose normal, gait normal.  Strength is 5-5 in all 4 extremities, however, of note the patient does have a little bit of hesitancy on motion of the left upper extremity and left lower extremity, however, he can overcome this and is able to demonstrate full strenght.   Cardio: Regular rate and rhythm, no murmurs, rubs or gallops.        Assessment and Plan:     1. Weakness  Patient has subjective weakness with an interesting exam described above, however, I am reassured that he is able to overcome the hesitancy on motor function and demonstrates 5-5 strength in all 4 extremities.  I am also reassured that has had a CT scan of the head and neck as well as an MRI of the brain that is reassuring.  Considering the examination above as well as the description of now resolved left facial numbness and imaging studies, I wonder if there is a nonanatomic source of the patient's symptoms.  Nonetheless, I think it be wise for him to see a neurologist.  I am hopeful that his symptoms will resolve but if not, he may benefit from an EMG or EEG.  - REFERRAL TO NEUROLOGY    2. VSD (ventricular septal defect)  By report.  Examination normal.  - EC-ECHOCARDIOGRAM COMPLETE W/O CONT; Future        Followup: Return if symptoms worsen or fail to improve.         "

## 2021-03-02 ENCOUNTER — OFFICE VISIT (OUTPATIENT)
Dept: NEUROLOGY | Facility: MEDICAL CENTER | Age: 26
End: 2021-03-02
Attending: PSYCHIATRY & NEUROLOGY
Payer: COMMERCIAL

## 2021-03-02 VITALS
OXYGEN SATURATION: 96 % | SYSTOLIC BLOOD PRESSURE: 106 MMHG | BODY MASS INDEX: 23.43 KG/M2 | RESPIRATION RATE: 15 BRPM | TEMPERATURE: 98.2 F | WEIGHT: 167.33 LBS | HEIGHT: 71 IN | DIASTOLIC BLOOD PRESSURE: 64 MMHG | HEART RATE: 75 BPM

## 2021-03-02 DIAGNOSIS — R27.9: ICD-10-CM

## 2021-03-02 DIAGNOSIS — M54.2 CERVICALGIA: ICD-10-CM

## 2021-03-02 DIAGNOSIS — R29.898 BILATERAL ARM WEAKNESS: ICD-10-CM

## 2021-03-02 PROCEDURE — 99204 OFFICE O/P NEW MOD 45 MIN: CPT | Performed by: PSYCHIATRY & NEUROLOGY

## 2021-03-02 PROCEDURE — 99211 OFF/OP EST MAY X REQ PHY/QHP: CPT | Performed by: PSYCHIATRY & NEUROLOGY

## 2021-03-02 ASSESSMENT — FIBROSIS 4 INDEX: FIB4 SCORE: 0.29

## 2021-03-02 ASSESSMENT — PATIENT HEALTH QUESTIONNAIRE - PHQ9: CLINICAL INTERPRETATION OF PHQ2 SCORE: 0

## 2021-03-02 NOTE — PROGRESS NOTES
"No chief complaint on file.      History of present illness:  Santo Hernandez 25 y.o. male with bipolar disorder and OCD with recent ED visit for left neck pain and left arm/leg weakness and left facial numbness. He had a ED visit on 2/17/21 with a normal strength exam and normal CTA head/neck. He then returned 2/20/21 and had a normal brain MRI.       He has had 2 weeks of left sided gradual weakening of his left arm and leg. He has to strain to  his leg when walking. He feels that his entire left hemibody is fatigued, like he \"just ran a marathon\". He also has been having about 1 week of right hemibody weakness.     There is also twitching of his body when he relaxes that is occurring more frequently recently. He is currently being weaned off of lamotrigine by his psychiatrist due to these symptoms.     He has had migraines in the past, but the last one was years ago. He was found to have a B12 deficiency for a ED visit for a migraine in the past and is on supplementation now.     Past medical history:   Past Medical History:   Diagnosis Date   • Asthma    • B12 deficiency 10/12/2018   • Eczema    • GERD (gastroesophageal reflux disease)    • BOB (obstructive sleep apnea) 10/12/2018       Past surgical history:   No past surgical history on file.    Family history:   No family history on file.    Social history:   Social History     Socioeconomic History   • Marital status: Single     Spouse name: Not on file   • Number of children: Not on file   • Years of education: Not on file   • Highest education level: Not on file   Occupational History   • Not on file   Tobacco Use   • Smoking status: Former Smoker   • Smokeless tobacco: Never Used   Substance and Sexual Activity   • Alcohol use: Not Currently   • Drug use: Yes     Types: Marijuana, Inhaled     Comment: THC   • Sexual activity: Not on file   Other Topics Concern   • Not on file   Social History Narrative   • Not on file     Social Determinants of " "Health     Financial Resource Strain:    • Difficulty of Paying Living Expenses:    Food Insecurity:    • Worried About Running Out of Food in the Last Year:    • Ran Out of Food in the Last Year:    Transportation Needs:    • Lack of Transportation (Medical):    • Lack of Transportation (Non-Medical):    Physical Activity:    • Days of Exercise per Week:    • Minutes of Exercise per Session:    Stress:    • Feeling of Stress :    Social Connections:    • Frequency of Communication with Friends and Family:    • Frequency of Social Gatherings with Friends and Family:    • Attends Zoroastrianism Services:    • Active Member of Clubs or Organizations:    • Attends Club or Organization Meetings:    • Marital Status:    Intimate Partner Violence:    • Fear of Current or Ex-Partner:    • Emotionally Abused:    • Physically Abused:    • Sexually Abused:        Current medications:   Current Outpatient Medications   Medication   • LamoTRIgine 250 MG TABLET SR 24 HR   • guanFACINE (TENEX) 1 MG Tab   • albuterol 108 (90 Base) MCG/ACT Aero Soln inhalation aerosol   • fluocinonide (LIDEX) 0.05 % Ointment   • amoxicillin (AMOXIL) 500 MG Cap   • sertraline (ZOLOFT) 50 MG Tab     No current facility-administered medications for this visit.       Medication Allergy:  Allergies   Allergen Reactions   • Ibuprofen Swelling       Review of systems:   Review of Systems   Eyes: Negative for blurred vision and double vision.   Musculoskeletal: Negative for back pain and neck pain.   Neurological: Positive for tingling (Shooting pain in his fingertips) and sensory change (Intermittent sensation of legs falling asleep). Negative for headaches.        Physical examination:   Vitals:    03/02/21 0728   BP: 106/64   BP Location: Left arm   Pulse: 75   Resp: 15   Temp: 36.8 °C (98.2 °F)   TempSrc: Temporal   SpO2: 96%   Weight: 75.9 kg (167 lb 5.3 oz)   Height: 1.803 m (5' 11\")     Neurological Exam  Mental Status  Awake, alert and oriented to " person, place and time. Speech is normal. Language is fluent with no aphasia.    Cranial Nerves  CN III, IV, VI: Extraocular movements intact bilaterally. No nystagmus. Normal smooth pursuit. Normal lids and orbits bilaterally.  CN V:  Right: Diminished sensation of the entire right side of the face.  Left: Facial sensation is normal on the left.  CN VII:  Right: There is no facial weakness.  Left: There is no facial weakness.  CN VIII:  Right: Hearing is normal.  Left: Hearing is normal.  CN XI:  Right: Trapezius strength is normal.  Left: Trapezius strength is normal.    Motor   No abnormal involuntary movements. Strength is 5/5 throughout all four extremities.    Sensory  Light touch abnormality: Diminished left arm sensation to light touch. .     Reflexes                                           Right                      Left  Brachioradialis                    3+                         3+  Biceps                                 3+                         3+  Triceps                                2+                         2+  Patellar                                3+                         3+  Achilles                                3+                         3+  Plantar                           Downgoing                Downgoing    Right pathological reflexes: Ankle clonus absent.  Left pathological reflexes: Ankle clonus absent.    Coordination  Right: Finger-to-nose normal. Rapid alternating movement normal. Heel-to-shin normal.  Left: Finger-to-nose normal. Rapid alternating movement normal. Heel-to-shin normal.    Gait  Casual gait is normal including stance, stride, and arm swing.  Normal toe walking. Normal heel walking.        Labs:  None    Imagin21 CT HEAD/NECK: normal   21 MRI BRAIN W/O: I viewed the images personally. This is a normal scan.       ASSESSMENT AND PLAN:  Problem List Items Addressed This Visit     None      Visit Diagnoses     Bilateral arm weakness         Relevant Orders    MR-CERVICAL SPINE-W/O    Cervicalgia        Relevant Orders    MR-CERVICAL SPINE-W/O    Symptom of incoordination              1. Bilateral arm weakness  - MR-CERVICAL SPINE-W/O; Future    2. Cervicalgia  - MR-CERVICAL SPINE-W/O; Future    3. Symptom of incoordination    This is a 25 year old male with subjective bilateral arm/leg weakness and incoordination with anterior neck discomfort and paresthesias in his fingers. I have ordered a MRI cervical spine to rule out transverse myelitis/cord lesions however his exam is negative for objective findings aside from brisk reflexes. He is currently weaning off of lamotrigine which may be the etiology of his subjective incoordination.     FOLLOW-UP:   Return in about 2 months (around 5/2/2021) for in person follow-up .    Total time spent for the day for this patient is: 40 minutes. I spent 27 minutes in face to face time and I spent 6 minutes pre-charting and 7 minutes in post-visit documentation.      OLIVER GarciaO.  [facility]   Movement Disorders Specialist

## 2021-03-02 NOTE — PATIENT INSTRUCTIONS
I have ordered a MRI of the cervical spine.     Your coordination problems may be due to lamotrigine and I support weaning off of this.

## 2021-03-02 NOTE — PROGRESS NOTES
No chief complaint on file.      History of present illness:  Santo Hernandez 25 y.o. male with recent ED visit for left neck pain and left arm/leg weakness and left facial numbness. He had a ED visit on 2/17/21 with a normal strength exam and normal CTA head/neck. He then returned 2/20/21 and had a normal brain MRI.         Past medical history:   Past Medical History:   Diagnosis Date   • Asthma    • B12 deficiency 10/12/2018   • Eczema    • GERD (gastroesophageal reflux disease)    • BOB (obstructive sleep apnea) 10/12/2018       Past surgical history:   No past surgical history on file.    Family history:   No family history on file.    Social history:   Social History     Socioeconomic History   • Marital status: Single     Spouse name: Not on file   • Number of children: Not on file   • Years of education: Not on file   • Highest education level: Not on file   Occupational History   • Not on file   Tobacco Use   • Smoking status: Former Smoker   • Smokeless tobacco: Never Used   Substance and Sexual Activity   • Alcohol use: Not Currently   • Drug use: Yes     Types: Marijuana, Inhaled     Comment: THC   • Sexual activity: Not on file   Other Topics Concern   • Not on file   Social History Narrative   • Not on file     Social Determinants of Health     Financial Resource Strain:    • Difficulty of Paying Living Expenses:    Food Insecurity:    • Worried About Running Out of Food in the Last Year:    • Ran Out of Food in the Last Year:    Transportation Needs:    • Lack of Transportation (Medical):    • Lack of Transportation (Non-Medical):    Physical Activity:    • Days of Exercise per Week:    • Minutes of Exercise per Session:    Stress:    • Feeling of Stress :    Social Connections:    • Frequency of Communication with Friends and Family:    • Frequency of Social Gatherings with Friends and Family:    • Attends Christianity Services:    • Active Member of Clubs or Organizations:    • Attends Club or  Organization Meetings:    • Marital Status:    Intimate Partner Violence:    • Fear of Current or Ex-Partner:    • Emotionally Abused:    • Physically Abused:    • Sexually Abused:        Current medications:   Current Outpatient Medications   Medication   • amoxicillin (AMOXIL) 500 MG Cap   • LamoTRIgine 250 MG TABLET SR 24 HR   • guanFACINE (TENEX) 1 MG Tab   • sertraline (ZOLOFT) 50 MG Tab   • albuterol 108 (90 Base) MCG/ACT Aero Soln inhalation aerosol   • fluocinonide (LIDEX) 0.05 % Ointment     No current facility-administered medications for this visit.       Medication Allergy:  Allergies   Allergen Reactions   • Ibuprofen Swelling       Review of systems:   ROS     Physical examination:   There were no vitals filed for this visit.  Neurological Exam    Labs:  I reviewed the following labs personally:  ***    Imagin21 CT HEAD/NECK: normal   21 MRI BRAIN W/O: I viewed the images personally. This is a normal scan.       ASSESSMENT AND PLAN:  Problem List Items Addressed This Visit     None          There are no diagnoses linked to this encounter.    FOLLOW-UP:   No follow-ups on file.    Total time spent for the day for this patient is: *** minutes. I spent *** minutes in face to face time and I spent *** minutes pre-charting and *** minutes in post-visit documentation.      OLIVER GarciaO.  [facility]   Movement Disorders Specialist

## 2021-03-12 ENCOUNTER — HOSPITAL ENCOUNTER (OUTPATIENT)
Dept: RADIOLOGY | Facility: MEDICAL CENTER | Age: 26
End: 2021-03-12
Attending: PSYCHIATRY & NEUROLOGY
Payer: COMMERCIAL

## 2021-03-12 DIAGNOSIS — M54.2 CERVICALGIA: ICD-10-CM

## 2021-03-12 DIAGNOSIS — R29.898 BILATERAL ARM WEAKNESS: ICD-10-CM

## 2021-03-12 PROCEDURE — 72141 MRI NECK SPINE W/O DYE: CPT

## 2021-03-17 ENCOUNTER — PATIENT MESSAGE (OUTPATIENT)
Dept: NEUROLOGY | Facility: MEDICAL CENTER | Age: 26
End: 2021-03-17

## 2021-03-17 DIAGNOSIS — M54.12 CERVICAL RADICULOPATHY: ICD-10-CM

## 2021-03-17 NOTE — PROGRESS NOTES
I have called patient and informed him that his MRI cervical spine shows moderate disc bulge and left lateral recess compromise however this should not be responsible for left hemibody symptoms as the spinal cord is not compromised. I have sent a PT referral for conservative treatment of radiculopathy.

## 2021-04-06 ENCOUNTER — HOSPITAL ENCOUNTER (OUTPATIENT)
Dept: CARDIOLOGY | Facility: MEDICAL CENTER | Age: 26
End: 2021-04-06
Attending: FAMILY MEDICINE
Payer: COMMERCIAL

## 2021-04-06 DIAGNOSIS — Q21.0 VSD (VENTRICULAR SEPTAL DEFECT): ICD-10-CM

## 2021-04-06 LAB
LV EJECT FRACT  99904: 60
LV EJECT FRACT MOD 2C 99903: 54.25
LV EJECT FRACT MOD 4C 99902: 69.22
LV EJECT FRACT MOD BP 99901: 61.27

## 2021-04-06 PROCEDURE — 93306 TTE W/DOPPLER COMPLETE: CPT

## 2021-04-06 PROCEDURE — 93306 TTE W/DOPPLER COMPLETE: CPT | Mod: 26 | Performed by: INTERNAL MEDICINE

## 2021-04-15 ENCOUNTER — PHYSICAL THERAPY (OUTPATIENT)
Dept: PHYSICAL THERAPY | Facility: MEDICAL CENTER | Age: 26
End: 2021-04-15
Attending: PSYCHIATRY & NEUROLOGY
Payer: COMMERCIAL

## 2021-04-15 DIAGNOSIS — M54.12 CERVICAL RADICULOPATHY: ICD-10-CM

## 2021-04-15 PROCEDURE — 97012 MECHANICAL TRACTION THERAPY: CPT

## 2021-04-15 PROCEDURE — 97110 THERAPEUTIC EXERCISES: CPT

## 2021-04-15 PROCEDURE — 97162 PT EVAL MOD COMPLEX 30 MIN: CPT

## 2021-04-15 ASSESSMENT — ENCOUNTER SYMPTOMS
PAIN TIMING: IN THE EVENING
ALLEVIATING FACTORS: SLEEPING
PAIN SCALE: 1
EXACERBATED BY: STRESS
ALLEVIATING FACTORS: LYING DOWN
PAIN TIMING: IN THE AFTERNOON
PAIN SCALE AT HIGHEST: 4

## 2021-04-15 NOTE — OP THERAPY EVALUATION
"  Outpatient Physical Therapy  INITIAL EVALUATION    [facility] Outpatient Physical Therapy  [address]  [place] [address]  Phone:  [phone]  Fax:  [phone]    Date of Evaluation: 04/15/2021    Patient: Santo Hernandez  YOB: 1995  MRN: [id number]     Referring Provider: LORA Avendaño  [address]  [place] [address]   Referring Diagnosis Cervical radiculopathy [M54.12]     Time Calculation    Start time: 1330  Stop time: 1430 Time Calculation (min): 60 minutes         Chief Complaint: No chief complaint on file.    Visit Diagnoses     ICD-10-CM   1. Cervical radiculopathy  M54.12       No data found  Subjective:   History of Present Illness:     Mechanism of injury:  Per Dr. Blackwood's note   Santo Hernandez 25 y.o. male with bipolar disorder and OCD with recent ED visit for left neck pain and left arm/leg weakness and left facial numbness. He had a ED visit on 21 with a normal strength exam and normal CTA head/neck. He then returned 21 and had a normal brain MRI.       He has had 2 weeks of left sided gradual weakening of his left arm and leg. He has to strain to  his leg when walking. He feels that his entire left hemibody is fatigued, like he \"just ran a marathon\". He also has been having about 1 week of right hemibody weakness    He presents to PT with complaints of muscle fatigue and weakness on his whole left side. He reports the numbness and tingling has disappeared.   Denies pain when coughing or sneezing.  Denies urinary incontinence or saddle parasthesia.      He reports he feels like the whole left side is \"very fatigued\"  He reports he does drop cups and his leg will give out and he will trip occasionally.      Patient does report that he has pain in his neck at the end of the day.   Sleep disturbance:  Not disrupted  Pain:     Current pain ratin    At worst pain ratin    Quality: weakness     Pain " timing:  In the afternoon and in the evening    Relieving factors:  Lying down and sleeping (lying on left side )    Aggravating factors:  Stress (not sleeping, )  Social Support:     Lives in:  One-story house    Lives with:  Parents  Hand dominance:  Right  Diagnostic Tests:     Diagnostic Tests Comments:  At C2-3, no abnormality.     At C3-4, negligible right paramedian disc bulge. No central or foraminal stenosis.     At C4-5, no central or foraminal stenosis.     At C5-6, no central or foraminal stenosis.     At C6-7, there is a moderate-sized left paramedian disc protrusion with a moderate-sized component of left-sided caudad disc extrusion extending down to at least the mid C7 vertebral body level (T2 sagittal images 6, 7 series 2, gradient echo axial image   9, series 6, T2 axial image 8, series 5). This results in marked left lateral recess stenosis. The neural foramina appear grossly intact.     At C7-T1, no significant disc bulge or protrusion. The neural foramina and thecal sac are widely patent.     IMPRESSION:     1.  C3-4 negligible right paramedian disc bulge. No central or foraminal stenosis.  2.  C6-C7 moderate-sized left paramedian disc protrusion with moderate sized component of left-sided caudad disc extrusion extending down to at least the mid C7 vertebral body level. Marked compromise of the left lateral recess  Activities of Daily Living:     Patient reported ADL status: Patient is not working, nor is he in school at this time.  He reports he is sitting most of the day.  He is on the phone with friends most of the day.  He reports this is affecting the majority of his function.  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    Patient Goals:     Other patient goals:  To decrease the discomfort in his neck.        Past Medical History:   Diagnosis Date   • Asthma    • B12 deficiency 10/12/2018   • Eczema    • GERD (gastroesophageal reflux disease)   "  • BOB (obstructive sleep apnea) 10/12/2018     No past surgical history on file.  Social History     Tobacco Use   • Smoking status: Former Smoker   • Smokeless tobacco: Never Used   Substance Use Topics   • Alcohol use: Not Currently     Family and Occupational History     Socioeconomic History   • Marital status: Single     Spouse name: Not on file   • Number of children: Not on file   • Years of education: Not on file   • Highest education level: Not on file   Occupational History   • Not on file       Objective     Postural Observations  Seated posture: fair  Standing posture: fair        Shoulder Screen    Shoulder active range of motion within functional limits.  Shoulder strength within functional limits.    Neurological Testing     Reflexes   Left   Biceps (C5/C6): brisk (3+)  Triceps (C7): brisk (3+)    Right   Biceps (C5/C6): brisk (3+)  Brachioradialis (C6): brisk (3+)    Additional Neurological Details  Tremor noted in bilateral UEs    patietn reports \"dull\" sensation in entire left side from face down to foot    2 beats of clonus on bilateral gastroc     Active Range of Motion     Cervical Spine   Flexion: within functional limits  Extension: within functional limits  Retraction: within functional limits  Left lateral flexion: within functional limits  Right lateral flexion: within functional limits  Left rotation: within functional limits  Right rotation: within functional limits    Strength:      Left Wrist/Hand    (2nd hand position)     Trial 1: 50    Trial 2: 40    Trial 3: 44    Average: 42.67    Right Wrist/Hand    (2nd hand position)     Trial 1: 80    Trial 2: 72    Trial 3: 65    Average: 72.33    Tests   Cervical spine   Positive cervical spine distraction.        Therapeutic Exercises (CPT 81184):     1. [place]     2. Education regarding standing frequently and not sitting for extended periods    3. Chin tuck     8. UPOC due 5/27/21      Therapeutic Exercise Summary: Access Code: " [email or web address]  URL: [email or web address]  Date: 04/15/2021  Prepared by: Shireen Robles    Exercises  Standing Shoulder External Rotation with Resistance - 5 x daily - 7 x weekly - 10 sets - 3 hold      Therapeutic Treatments and Modalities:     1. Mechanical Traction (CPT 25972), 15/9#  60/20 sec with heat x 1 5min, 80# left  strength tested immediately post traction    Time-based treatments/modalities:    Physical Therapy Timed Treatment Charges  Therapeutic exercise minutes (CPT 56157): 10 minutes      Assessment, Response and Plan:   Impairments: abnormal muscle firing, activity intolerance and impaired physical strength    Assessment details:  Patient is a 25 year old male who reports left sided weakness and fatigue. He demonstrates bilateral hand tremors and hyperreflexia. His MRI reveals moderate-sized left paramedian disc protrusion with a moderate-sized component of left-sided caudad disc extrusion extending down to at least the mid C7 vertebral body level. Based on PT evaluation, his symptoms are not traditional, but he will benefit from a PT trial to determine if he can improve his neck pain and left hand weakness.  If no change in symptoms then recommend follow up with referring MD.   Goals:   Short Term Goals:   1. Patient will report less fatigue at the end of the day by 20%  Short term goal time span:  2-4 weeks      Long Term Goals:    1. Patient will demonstrate improved left  strength by 10#.  2. Patient will report the ability to be able to walk for at least 20 - 30 minutes daily without being limited by fatigue.   3. Patient will be independent in upgraded HEP with written instructions.       Long term goal time span:  6-8 weeks    Plan:   Therapy options:  Physical therapy treatment to continue  Planned therapy interventions:  Neuromuscular Re-education (CPT 91098), Mechanical Traction (CPT 97351), E Stim Unattended (CPT 07114), Therapeutic Exercise (CPT 40083) and Therapeutic  Activities (CPT 28033)  Frequency:  2x week  Duration in weeks:  6  Discussed with:  Patient      Functional Assessment Used        Referring provider co-signature:  I have reviewed this plan of care and my co-signature certifies the need for services.    Certification Period: 04/15/2021 to  05/27/21    Physician Signature: ________________________________ Date: ______________

## 2021-04-19 ENCOUNTER — PHYSICAL THERAPY (OUTPATIENT)
Dept: PHYSICAL THERAPY | Facility: MEDICAL CENTER | Age: 26
End: 2021-04-19
Attending: PSYCHIATRY & NEUROLOGY
Payer: COMMERCIAL

## 2021-04-19 DIAGNOSIS — M54.12 CERVICAL RADICULOPATHY: ICD-10-CM

## 2021-04-19 PROCEDURE — 97110 THERAPEUTIC EXERCISES: CPT

## 2021-04-19 NOTE — OP THERAPY DAILY TREATMENT
Outpatient Physical Therapy  DAILY TREATMENT     [facility] Outpatient Physical Therapy  [address]  [place] [address]  Phone:  [phone]  Fax:  [phone]    Date: 04/19/2021    Patient: Santo Hernandez  YOB: 1995  MRN: [id number]     Time Calculation    Start time: 0237  Stop time: 0300 Time Calculation (min): 23 minutes         Chief Complaint: No chief complaint on file.    Visit #: 2    SUBJECTIVE:  I think I am doing better.     OBJECTIVE:  Current objective measures:       Therapeutic Exercises (CPT 53486):     1. [place]     2. Education regarding standing frequently and not sitting for extended periods    3. Chin tuck     4. Foam roller , chin tuck , pec stretch, Gh flexion, serratus press     8. UPOC due 5/27/21      Therapeutic Exercise Summary: Access Code: [email or web address]  URL: [email or web address]  Date: 04/15/2021  Prepared by: Shireen Robles    Exercises  Standing Shoulder External Rotation with Resistance - 5 x daily - 7 x weekly - 10 sets - 3 hold  Access Code: [email or web address]  URL: [email or web address]  Date: 04/19/2021  Prepared by: Shireen Robles    Exercises  Standing Shoulder External Rotation with Resistance - 5 x daily - 7 x weekly - 10 sets - 3 hold  Supine Chest Stretch on Foam Roll - 1 x daily - 7 x weekly - 10 reps - 3 sets  Thoracic Extension Mobilization on Foam Roll - 1 x daily - 7 x weekly - 10 reps - 3 sets  [place] on Foam Roll - 1 x daily - 7 x weekly - 10 reps - 3 sets  Hooklying Scapular Protraction on Foam Roll - 1 x daily - 7 x weekly - 10 reps - 3 sets  Thoracic Foam Roll Mobilization Bench Press - 1 x daily - 7 x weekly - 10 reps - 3 sets      Therapeutic Treatments and Modalities:     1. Mechanical Traction (CPT 13824), 15/9#  60/20 sec with heat x 1 5min, 80# left  strength tested immediately post traction    Time-based treatments/modalities:    Physical Therapy Timed Treatment Charges  Therapeutic exercise  minutes (CPT 56279): 23 minutes        ASSESSMENT:   Response to treatment: Patient reports primarily generalized weakness.     PLAN/RECOMMENDATIONS:   Plan for treatment: therapy treatment to continue next visit.  Planned interventions for next visit: continue with current treatment.

## 2021-04-21 ENCOUNTER — PHYSICAL THERAPY (OUTPATIENT)
Dept: PHYSICAL THERAPY | Facility: MEDICAL CENTER | Age: 26
End: 2021-04-21
Attending: PSYCHIATRY & NEUROLOGY
Payer: COMMERCIAL

## 2021-04-21 DIAGNOSIS — M54.12 CERVICAL RADICULOPATHY: ICD-10-CM

## 2021-04-21 PROCEDURE — 97110 THERAPEUTIC EXERCISES: CPT

## 2021-04-21 PROCEDURE — 97012 MECHANICAL TRACTION THERAPY: CPT

## 2021-04-21 NOTE — OP THERAPY DAILY TREATMENT
Outpatient Physical Therapy  DAILY TREATMENT     [facility] Outpatient Physical Therapy  [address]  [place] [address]  Phone:  [phone]  Fax:  [phone]    Date: 04/21/2021    Patient: Santo Hernandez  YOB: 1995  MRN: [id number]     Time Calculation    Start time: 0235  Stop time: 0330 Time Calculation (min): 55 minutes         Chief Complaint: No chief complaint on file.    Visit #: 3    SUBJECTIVE:  Its all a little bit better. I haven't dropped anything yesterday.     OBJECTIVE:  Current objective measures:   80, 70, 64 on right  58, 55, 52 on left  70, 60, 65 after traction    Therapeutic Exercises (CPT 24238):     1. [place] on swiss ball , 1 min x 3 reps    2. Education regarding standing frequently and not sitting for extended periods    3. Chin tuck     4. Bridge on swiss ball ,  20 sec x 10 reps     8. UPOC due 5/27/21      Therapeutic Exercise Summary: Access Code: [email or web address]  URL: [email or web address]  Date: 04/15/2021  Prepared by: Shireen Robles    Exercises  Standing Shoulder External Rotation with Resistance - 5 x daily - 7 x weekly - 10 sets - 3 hold  Access Code: [email or web address]  URL: [email or web address]  Date: 04/19/2021  Prepared by: Shireen Robles    Exercises  Standing Shoulder External Rotation with Resistance - 5 x daily - 7 x weekly - 10 sets - 3 hold  Supine Chest Stretch on Foam Roll - 1 x daily - 7 x weekly - 10 reps - 3 sets  Thoracic Extension Mobilization on Foam Roll - 1 x daily - 7 x weekly - 10 reps - 3 sets  [place] on Foam Roll - 1 x daily - 7 x weekly - 10 reps - 3 sets  Hooklying Scapular Protraction on Foam Roll - 1 x daily - 7 x weekly - 10 reps - 3 sets  Thoracic Foam Roll Mobilization Bench Press - 1 x daily - 7 x weekly - 10 reps - 3 sets      Therapeutic Treatments and Modalities:     1. Mechanical Traction (CPT 06747), 15/9#  60/20 sec with heat x 1 5min, 80# left  strength tested immediately post  traction    Time-based treatments/modalities:    Physical Therapy Timed Treatment Charges  Therapeutic exercise minutes (CPT 53355): 25 minutes        ASSESSMENT:   Response to treatment: Patient is reporting decreased overall pain.     PLAN/RECOMMENDATIONS:   Plan for treatment: therapy treatment to continue next visit.  Planned interventions for next visit: continue with current treatment.

## 2021-04-25 ENCOUNTER — APPOINTMENT (OUTPATIENT)
Dept: RADIOLOGY | Facility: MEDICAL CENTER | Age: 26
End: 2021-04-25
Attending: EMERGENCY MEDICINE
Payer: COMMERCIAL

## 2021-04-25 ENCOUNTER — HOSPITAL ENCOUNTER (EMERGENCY)
Facility: MEDICAL CENTER | Age: 26
End: 2021-04-25
Attending: EMERGENCY MEDICINE
Payer: COMMERCIAL

## 2021-04-25 VITALS
BODY MASS INDEX: 23.1 KG/M2 | DIASTOLIC BLOOD PRESSURE: 56 MMHG | HEART RATE: 82 BPM | WEIGHT: 165 LBS | TEMPERATURE: 96.6 F | RESPIRATION RATE: 13 BRPM | OXYGEN SATURATION: 95 % | HEIGHT: 71 IN | SYSTOLIC BLOOD PRESSURE: 98 MMHG

## 2021-04-25 DIAGNOSIS — R55 SYNCOPE, UNSPECIFIED SYNCOPE TYPE: ICD-10-CM

## 2021-04-25 DIAGNOSIS — R56.9 SEIZURE-LIKE ACTIVITY (HCC): ICD-10-CM

## 2021-04-25 LAB
ALBUMIN SERPL BCP-MCNC: 3.5 G/DL (ref 3.2–4.9)
ALBUMIN/GLOB SERPL: 1.6 G/DL
ALP SERPL-CCNC: 87 U/L (ref 30–99)
ALT SERPL-CCNC: 23 U/L (ref 2–50)
ANION GAP SERPL CALC-SCNC: 6 MMOL/L (ref 7–16)
AST SERPL-CCNC: 23 U/L (ref 12–45)
BASOPHILS # BLD AUTO: 0.6 % (ref 0–1.8)
BASOPHILS # BLD: 0.06 K/UL (ref 0–0.12)
BILIRUB SERPL-MCNC: 0.2 MG/DL (ref 0.1–1.5)
BUN SERPL-MCNC: 14 MG/DL (ref 8–22)
CALCIUM SERPL-MCNC: 8.4 MG/DL (ref 8.5–10.5)
CHLORIDE SERPL-SCNC: 105 MMOL/L (ref 96–112)
CO2 SERPL-SCNC: 27 MMOL/L (ref 20–33)
CREAT SERPL-MCNC: 0.91 MG/DL (ref 0.5–1.4)
EKG IMPRESSION: NORMAL
EOSINOPHIL # BLD AUTO: 1.16 K/UL (ref 0–0.51)
EOSINOPHIL NFR BLD: 11.5 % (ref 0–6.9)
ERYTHROCYTE [DISTWIDTH] IN BLOOD BY AUTOMATED COUNT: 42.2 FL (ref 35.9–50)
GLOBULIN SER CALC-MCNC: 2.2 G/DL (ref 1.9–3.5)
GLUCOSE SERPL-MCNC: 112 MG/DL (ref 65–99)
HCT VFR BLD AUTO: 44 % (ref 42–52)
HGB BLD-MCNC: 14.3 G/DL (ref 14–18)
IMM GRANULOCYTES # BLD AUTO: 0.04 K/UL (ref 0–0.11)
IMM GRANULOCYTES NFR BLD AUTO: 0.4 % (ref 0–0.9)
LYMPHOCYTES # BLD AUTO: 3.15 K/UL (ref 1–4.8)
LYMPHOCYTES NFR BLD: 31.3 % (ref 22–41)
MCH RBC QN AUTO: 30.8 PG (ref 27–33)
MCHC RBC AUTO-ENTMCNC: 32.5 G/DL (ref 33.7–35.3)
MCV RBC AUTO: 94.6 FL (ref 81.4–97.8)
MONOCYTES # BLD AUTO: 0.66 K/UL (ref 0–0.85)
MONOCYTES NFR BLD AUTO: 6.6 % (ref 0–13.4)
NEUTROPHILS # BLD AUTO: 5 K/UL (ref 1.82–7.42)
NEUTROPHILS NFR BLD: 49.6 % (ref 44–72)
NRBC # BLD AUTO: 0 K/UL
NRBC BLD-RTO: 0 /100 WBC
PLATELET # BLD AUTO: 270 K/UL (ref 164–446)
PMV BLD AUTO: 10.1 FL (ref 9–12.9)
POTASSIUM SERPL-SCNC: 3.8 MMOL/L (ref 3.6–5.5)
PROT SERPL-MCNC: 5.7 G/DL (ref 6–8.2)
RBC # BLD AUTO: 4.65 M/UL (ref 4.7–6.1)
SODIUM SERPL-SCNC: 138 MMOL/L (ref 135–145)
TROPONIN T SERPL-MCNC: <6 NG/L (ref 6–19)
WBC # BLD AUTO: 10.1 K/UL (ref 4.8–10.8)

## 2021-04-25 PROCEDURE — 84484 ASSAY OF TROPONIN QUANT: CPT

## 2021-04-25 PROCEDURE — 85025 COMPLETE CBC W/AUTO DIFF WBC: CPT

## 2021-04-25 PROCEDURE — 700105 HCHG RX REV CODE 258: Performed by: EMERGENCY MEDICINE

## 2021-04-25 PROCEDURE — 93005 ELECTROCARDIOGRAM TRACING: CPT

## 2021-04-25 PROCEDURE — 99285 EMERGENCY DEPT VISIT HI MDM: CPT

## 2021-04-25 PROCEDURE — 70450 CT HEAD/BRAIN W/O DYE: CPT

## 2021-04-25 PROCEDURE — 93005 ELECTROCARDIOGRAM TRACING: CPT | Performed by: EMERGENCY MEDICINE

## 2021-04-25 PROCEDURE — 80053 COMPREHEN METABOLIC PANEL: CPT

## 2021-04-25 PROCEDURE — 72125 CT NECK SPINE W/O DYE: CPT

## 2021-04-25 RX ORDER — SODIUM CHLORIDE 9 MG/ML
1000 INJECTION, SOLUTION INTRAVENOUS ONCE
Status: COMPLETED | OUTPATIENT
Start: 2021-04-25 | End: 2021-04-25

## 2021-04-25 RX ADMIN — SODIUM CHLORIDE 1000 ML: 9 INJECTION, SOLUTION INTRAVENOUS at 10:53

## 2021-04-25 ASSESSMENT — LIFESTYLE VARIABLES: DO YOU DRINK ALCOHOL: NO

## 2021-04-25 ASSESSMENT — FIBROSIS 4 INDEX: FIB4 SCORE: 0.29

## 2021-04-25 NOTE — ED NOTES
Pt discharged to home with father. Pt was given follow up instructions. Pt verbalized understanding of all instructions for discharge and is ambulatory out of ED with steady gait.

## 2021-04-25 NOTE — ED PROVIDER NOTES
ED Provider Note    ER PROVIDER NOTE        CHIEF COMPLAINT  Chief Complaint   Patient presents with   • Seizure       HPI  Santo Hernandez is a 25 y.o. male who presents to the emergency department after passing out with seizure-like activity.  Patient states he remembers getting up to go to the bathroom and then woke up on the floor.  He has had issues with getting lightheaded and cannot with bathroom before he thinks secondary to some medications.  He reports no recent illness fevers chills cough congestion, nausea, vomiting or abdominal pain.  No chest pain or shortness of breath either preceding or currently.  No palpitations.  No leg pain or swelling.  He does report headache and neck pain after the fall but none preceding.  His father heard him fall in the bathroom, went to check on him, had some observed generalized shaking for less than 1 minute, quickly came back to baseline without any prolonged confusion.  There was a dent in the wall from where he fell.      REVIEW OF SYSTEMS  Pertinent positives include syncope, seizure-like activity. Pertinent negatives include no vomiting or fever. See HPI for details. All other systems reviewed and are negative.    PAST MEDICAL HISTORY   has a past medical history of Asthma, B12 deficiency (10/12/2018), Eczema, GERD (gastroesophageal reflux disease), and BOB (obstructive sleep apnea) (10/12/2018).    SURGICAL HISTORY  patient denies any surgical history    FAMILY HISTORY  No family history on file.    SOCIAL HISTORY  Social History     Socioeconomic History   • Marital status: Single     Spouse name: Not on file   • Number of children: Not on file   • Years of education: Not on file   • Highest education level: Not on file   Occupational History   • Not on file   Tobacco Use   • Smoking status: Former Smoker   • Smokeless tobacco: Never Used   Substance and Sexual Activity   • Alcohol use: Not Currently   • Drug use: Yes     Types: Marijuana, Inhaled     Comment:  "THC   • Sexual activity: Not on file   Other Topics Concern   • Not on file   Social History Narrative   • Not on file     Social Determinants of Health     Financial Resource Strain:    • Difficulty of Paying Living Expenses:    Food Insecurity:    • Worried About Running Out of Food in the Last Year:    • Ran Out of Food in the Last Year:    Transportation Needs:    • Lack of Transportation (Medical):    • Lack of Transportation (Non-Medical):    Physical Activity:    • Days of Exercise per Week:    • Minutes of Exercise per Session:    Stress:    • Feeling of Stress :    Social Connections:    • Frequency of Communication with Friends and Family:    • Frequency of Social Gatherings with Friends and Family:    • Attends [employer] Services:    • Active Member of Clubs or Organizations:    • Attends Club or Organization Meetings:    • Marital Status:    Intimate Partner Violence:    • Fear of Current or Ex-Partner:    • Emotionally Abused:    • Physically Abused:    • Sexually Abused:       Social History     Substance and Sexual Activity   Drug Use Yes   • Types: Marijuana, Inhaled    Comment: THC       CURRENT MEDICATIONS  Home Medications    **Home medications have not yet been reviewed for this encounter**         ALLERGIES  Allergies   Allergen Reactions   • Ibuprofen Swelling       PHYSICAL EXAM  VITAL SIGNS: BP (!) 98/56   Pulse 82   Temp 35.9 °C (96.6 °F) (Temporal)   Resp 13   Ht 1.803 m (5' 11\")   Wt 74.8 kg (165 lb)   SpO2 95%   BMI 23.01 kg/m²   Pulse ox interpretation: I interpret this pulse ox as normal.    Constitutional: Alert in no apparent distress.  HENT: No signs of trauma, Bilateral external ears normal, Nose normal.  Mucous membranes mildly dry  Eyes: Pupils are equal and reactive, Conjunctiva normal, Non-icteric.   Neck: Normal range of motion, No tenderness, Supple, No stridor.   Lymphatic: No lymphadenopathy noted.   Cardiovascular: Regular rate and rhythm, no murmurs.   Thorax & " Lungs: Normal breath sounds, No respiratory distress, No wheezing, No chest tenderness.   Abdomen: Bowel sounds normal, Soft, No tenderness, No masses, No pulsatile masses. No peritoneal signs.  Skin: Warm, Dry, No erythema, No rash.   Back: No bony tenderness, No CVA tenderness.   Extremities: Intact distal pulses, No edema, No tenderness, No cyanosis, Negative Ester's sign.  Musculoskeletal: Good range of motion in all major joints. No tenderness to palpation or major deformities noted.   Neurologic: Alert, cranial nerves intact, speech is appropriate or not slurred, upper extremities bilaterally exhibit no drift, no dysmetria, 5 out of 5 strength with bilateral bicep/tricep/, sensation intact to light touch throughout upper extremities. Lower extremities strength 5 out of 5 thigh extension/flexion/abduction/adduction, knee extension/flexion, dorsiflexion plantar flexion. No clonus.  2+ patella reflexes.  sensation intact to light touch.  No focal deficits noted.   Psychiatric: Affect normal, Judgment normal, Mood normal.     DIAGNOSTIC STUDIES / PROCEDURES    EKG  Interpreted by me  Results for orders placed or performed during the hospital encounter of 04/25/21   CBC WITH DIFFERENTIAL   Result Value Ref Range    WBC 10.1 4.8 - 10.8 K/uL    RBC 4.65 (L) 4.70 - 6.10 M/uL    Hemoglobin 14.3 14.0 - 18.0 g/dL    Hematocrit 44.0 42.0 - 52.0 %    MCV 94.6 81.4 - 97.8 fL    MCH 30.8 27.0 - 33.0 pg    MCHC 32.5 (L) 33.7 - 35.3 g/dL    RDW 42.2 35.9 - 50.0 fL    Platelet Count 270 164 - 446 K/uL    MPV 10.1 9.0 - 12.9 fL    Neutrophils-Polys 49.60 44.00 - 72.00 %    Lymphocytes 31.30 22.00 - 41.00 %    Monocytes 6.60 0.00 - 13.40 %    Eosinophils 11.50 (H) 0.00 - 6.90 %    Basophils 0.60 0.00 - 1.80 %    Immature Granulocytes 0.40 0.00 - 0.90 %    Nucleated RBC 0.00 /100 WBC    Neutrophils (Absolute) 5.00 1.82 - 7.42 K/uL    Lymphs (Absolute) 3.15 1.00 - 4.80 K/uL    Monos (Absolute) 0.66 0.00 - 0.85 K/uL    Eos  (Absolute) 1.16 (H) 0.00 - 0.51 K/uL    Baso (Absolute) 0.06 0.00 - 0.12 K/uL    Immature Granulocytes (abs) 0.04 0.00 - 0.11 K/uL    NRBC (Absolute) 0.00 K/uL   COMP METABOLIC PANEL   Result Value Ref Range    Sodium 138 135 - 145 mmol/L    Potassium 3.8 3.6 - 5.5 mmol/L    Chloride 105 96 - 112 mmol/L    Co2 27 20 - 33 mmol/L    Anion Gap 6.0 (L) 7.0 - 16.0    Glucose 112 (H) 65 - 99 mg/dL    Bun 14 8 - 22 mg/dL    Creatinine 0.91 0.50 - 1.40 mg/dL    Calcium 8.4 (L) 8.5 - 10.5 mg/dL    AST(SGOT) 23 12 - 45 U/L    ALT(SGPT) 23 2 - 50 U/L    Alkaline Phosphatase 87 30 - 99 U/L    Total Bilirubin 0.2 0.1 - 1.5 mg/dL    Albumin 3.5 3.2 - 4.9 g/dL    Total Protein 5.7 (L) 6.0 - 8.2 g/dL    Globulin 2.2 1.9 - 3.5 g/dL    A-G Ratio 1.6 g/dL   TROPONIN   Result Value Ref Range    Troponin T <6 6 - 19 ng/L   ESTIMATED GFR   Result Value Ref Range    GFR If African American >60 >60 mL/min/1.73 m 2    GFR If Non African American >60 >60 mL/min/1.73 m 2   EKG   Result Value Ref Range    Report       [facility] Emergency Dept.    Test Date:  2021  Pt Name:    JOHNSON MAYA                  Department: ER  MRN:        [id number]                      Room:        13  Gender:     Male                         Technician: [id number]  :        1995                   Requested By:LILIAN NARANJO  Order #:    [id number]                    Reading MD: LILIAN NARANJO MD    Measurements  Intervals                                Axis  Rate:       79                           P:          81  MD:         160                          QRS:        69  QRSD:       90                           T:          55  QT:         388  QTc:        445    Interpretive Statements  SINUS RHYTHM  BASELINE WANDER IN LEAD(S) V2  Compared to ECG 2021 12:54:18  no change  Electronically Signed On 2021 12:26:32 PDT by LILIAN NARANJO MD           RADIOLOGY  CT-HEAD W/O   Final Result      No acute intracranial abnormality  is identified.      CT-CSPINE WITHOUT PLUS RECONS   Final Result      No fracture or subluxation is seen in the cervical spine.        The radiologist's interpretation of all radiological studies have been reviewed and images independently viewed by me.    COURSE & MEDICAL DECISION MAKING  Nursing notes, VS, PMSFHx reviewed in chart.    10:38 AM Patient seen and examined at bedside. Patient will be treated with IV fluid. Ordered for CBC, CMP, troponin, ECG, CT head, CT cervical spine to evaluate his symptoms.     12:02 PM  Patient is reevaluated, is comfortable, no return of symptoms, discussed results and plan for discharge to which the patient is agreeable      Review patient records, patient has history of VSD, had 2 visits in February with some headache and numbness, had CT CTA and MRI that were unrevealing other than a slight disc bulge    This patient was cared for during the COVID-19 pandemic.  History and physical exam may be limited/truncated by the inherent challenges of PPE and the need to decrease staff exposure to novel coronavirus.  Some aspects of disease management may be different to protect staff and help slow the spread of disease. I verified that, if possible, the patient was wearing a mask and I was wearing appropriate PPE every time I encountered the patient.     Current [facility] COVID19 protocol followed      HYDRATION: Based on the patient's presentation of Dehydration the patient was given IV fluids. IV Hydration was used because oral hydration was not as rapid as required. Upon recheck following hydration, the patient was improved.    Decision Making:  This is a 25 y.o. male presenting after an episode where he appears to have passed out.  There was some shaking observed but his presentation, and history suggest more syncope than seizure.  He has had no tonic-clonic movements, no tongue trauma or incontinence and no postictal.  Additionally has had some similar episodes of lightheadedness in  the past.  He did hit his head and neck when he fell, I obtain CT of these areas there is no evidence intracranial bleed, skull fracture or cervical spine fracture and he is neurologically intact.  Regarding his syncope, the ekg does not show any evidence of arrythmia, prolonged intervals, early excitation, or other abnormality such as an accessory pathway, qt prolongation, or brugada syndrome. ACS or MI are unlikely causes given nature of sx, unremarkable ECG and given the patients improvement the likely of acs of mi is very low. Other cardiac causes are also unlikely based on my history and physical examination.  Serious bacterial illness was considered but ruled out by history and physical exam.   There is no evidence of metabolic abnormalities to explain this episode of syncope on labs.  As the patient is now improved there is no evidence of emergent toxic, metabolic, or endocrine abnormalities.    The patient will return for new or worsening symptoms and is stable at the time of discharge.    The patient is referred to a primary physician for blood pressure management, diabetic screening, and for all other preventative health concerns.        DISPOSITION:  Patient will be discharged home in stable condition.    FOLLOW UP:  Clayton Ferrell M.D.  [address] [facility]  [address] [place] [id number]  [phone]    In 1 week        OUTPATIENT MEDICATIONS:  Discharge Medication List as of 4/25/2021 12:08 PM            FINAL IMPRESSION  1. Syncope, unspecified syncope type    2. Seizure-like activity (HCC)          The note accurately reflects work and decisions made by me.  Drew Ashford M.D.  4/25/2021  12:25 PM

## 2021-04-25 NOTE — ED TRIAGE NOTES
"Chief Complaint   Patient presents with   • Seizure       26 y/o male bib EMS for above complaint. Pt reports he woke up to go to the bathroom, started to feel dizzy, and \"blacked out\". Per EMS, pts father reports tonic clonic movements for about 20 seconds. Pt has no hx of seizures. Patient currently not c/o any pain.   Per EMS - FSBG 133. Pt given around 700ml fluids en route   AOx4. GCS 15.   VSS  "

## 2021-04-26 ENCOUNTER — APPOINTMENT (OUTPATIENT)
Dept: PHYSICAL THERAPY | Facility: MEDICAL CENTER | Age: 26
End: 2021-04-26
Attending: PSYCHIATRY & NEUROLOGY
Payer: COMMERCIAL

## 2021-04-26 NOTE — OP THERAPY DAILY TREATMENT
Outpatient Physical Therapy  DAILY TREATMENT     [facility]  [address]  [place] [address]  Phone:  [phone]  Fax:  [phone]    Date: 04/26/2021    Patient: Santo Hernandez  YOB: 1995  MRN: [id number]     Time Calculation                   Chief Complaint: No chief complaint on file.    Visit #: 4    SUBJECTIVE:  ***    OBJECTIVE:  Current objective measures: ***          Therapeutic Exercises (CPT 03945):     1. Supermans on swiss ball , 1 min x 3 reps    2. Education regarding standing frequently and not sitting for extended periods    3. Chin tuck     4. Bridge on swiss ball ,  20 sec x 10 reps     8. UPOC due 5/27/21      Therapeutic Exercise Summary: Access Code: [email or web address]  URL: [email or web address]  Date: 04/15/2021  Prepared by: Shireen Robles    Exercises  Standing Shoulder External Rotation with Resistance - 5 x daily - 7 x weekly - 10 sets - 3 hold  Access Code: [email or web address]  URL: [email or web address]  Date: 04/19/2021  Prepared by: Shireen Robles    Exercises  Standing Shoulder External Rotation with Resistance - 5 x daily - 7 x weekly - 10 sets - 3 hold  Supine Chest Stretch on Foam Roll - 1 x daily - 7 x weekly - 10 reps - 3 sets  Thoracic Extension Mobilization on Foam Roll - 1 x daily - 7 x weekly - 10 reps - 3 sets  Snow [place] on Foam Roll - 1 x daily - 7 x weekly - 10 reps - 3 sets  Hooklying Scapular Protraction on Foam Roll - 1 x daily - 7 x weekly - 10 reps - 3 sets  Thoracic Foam Roll Mobilization Bench Press - 1 x daily - 7 x weekly - 10 reps - 3 sets      Therapeutic Treatments and Modalities:     1. Mechanical Traction (CPT 62176), 15/9#  60/20 sec with heat x 1 5min, 80# left  strength tested immediately post traction    Time-based treatments/modalities:               ASSESSMENT:   Response to treatment: ***    PLAN/RECOMMENDATIONS:   Plan for treatment: {AMB OP THERAPY - THERAPY  "PLAN:[id number]::\"therapy treatment to continue next visit\"}.  Planned interventions for next visit: {PT PLANNED THERAPY INTERVENTIONS:[id number]::\"continue with current treatment\"}.       "

## 2021-05-13 ENCOUNTER — PHYSICAL THERAPY (OUTPATIENT)
Dept: PHYSICAL THERAPY | Facility: MEDICAL CENTER | Age: 26
End: 2021-05-13
Attending: FAMILY MEDICINE
Payer: COMMERCIAL

## 2021-05-13 DIAGNOSIS — M54.12 CERVICAL RADICULOPATHY: ICD-10-CM

## 2021-05-13 PROCEDURE — 999999 HB NO CHARGE

## 2021-05-13 NOTE — OP THERAPY DAILY TREATMENT
Outpatient Physical Therapy  DAILY TREATMENT     [facility]  [address]  [place] [address]  Phone:  [phone]  Fax:  [phone]    Date: 05/13/2021    Patient: Santo Hernandez  YOB: 1995  MRN: [id number]     Time Calculation    Start time: 1503  Stop time: 1516 Time Calculation (min): 13 minutes         Chief Complaint: No chief complaint on file.    Visit #: 4    SUBJECTIVE:  Patient reports he fell and hit his head and went in the ambulance and then he missed his appointment with his neurologist. He reports he has been back doing his exercises. He missed his neurology appt.      He reports that he the therapy has been helpful, but he has no showed 2 PT visits, went to the ED and no showed his neurology appt. At this time, recommend patient be placed on hold and follow up with neurology.  Patient verbalizes good understanding of recommendation.   OBJECTIVE:  Current objective measures:   No treatment provided this day.         Exercises/Treatment  Time-based treatments/modalities:             ASSESSMENT:   Response to treatment: see above     PLAN/RECOMMENDATIONS:   Plan for treatment: Place on hold at this time. Recommend follow up with neurologist.

## 2021-05-14 DIAGNOSIS — J45.20 MILD INTERMITTENT ASTHMA, UNSPECIFIED WHETHER COMPLICATED: ICD-10-CM

## 2021-05-18 RX ORDER — ALBUTEROL SULFATE 90 UG/1
2 AEROSOL, METERED RESPIRATORY (INHALATION) EVERY 6 HOURS PRN
Qty: 8.5 G | Refills: 3 | Status: SHIPPED | OUTPATIENT
Start: 2021-05-18

## 2021-05-20 ENCOUNTER — APPOINTMENT (OUTPATIENT)
Dept: PHYSICAL THERAPY | Facility: MEDICAL CENTER | Age: 26
End: 2021-05-20
Attending: FAMILY MEDICINE
Payer: COMMERCIAL

## 2021-05-20 ENCOUNTER — OFFICE VISIT (OUTPATIENT)
Dept: NEUROLOGY | Facility: MEDICAL CENTER | Age: 26
End: 2021-05-20
Attending: PSYCHIATRY & NEUROLOGY
Payer: COMMERCIAL

## 2021-05-20 VITALS
HEIGHT: 71 IN | HEART RATE: 85 BPM | WEIGHT: 165.12 LBS | TEMPERATURE: 99.1 F | SYSTOLIC BLOOD PRESSURE: 110 MMHG | OXYGEN SATURATION: 95 % | RESPIRATION RATE: 16 BRPM | DIASTOLIC BLOOD PRESSURE: 72 MMHG | BODY MASS INDEX: 23.12 KG/M2

## 2021-05-20 DIAGNOSIS — M54.12 CERVICAL RADICULOPATHY AT C7: ICD-10-CM

## 2021-05-20 PROBLEM — R53.1 WEAKNESS: Status: RESOLVED | Noted: 2021-02-23 | Resolved: 2021-05-20

## 2021-05-20 PROCEDURE — 99213 OFFICE O/P EST LOW 20 MIN: CPT | Performed by: PSYCHIATRY & NEUROLOGY

## 2021-05-20 PROCEDURE — 99211 OFF/OP EST MAY X REQ PHY/QHP: CPT | Performed by: PSYCHIATRY & NEUROLOGY

## 2021-05-20 ASSESSMENT — FIBROSIS 4 INDEX: FIB4 SCORE: 0.48

## 2021-05-20 NOTE — PROGRESS NOTES
"Chief Complaint   Patient presents with   • New Patient     Bilateral Arm Weakness       History of present illness:  Santo Hernandez 25 y.o. male with bipolar disorder and OCD.     3/2/21:  He had a recent ED visit for left neck pain and left arm/leg weakness and left facial numbness. He had a ED visit on 2/17/21 with a normal strength exam and normal CTA head/neck. He then returned 2/20/21 and had a normal brain MRI.       He has had 2 weeks of left sided gradual weakening of his left arm and leg. He has to strain to  his leg when walking. He feels that his entire left hemibody is fatigued, like he \"just ran a marathon\". He also has been having about 1 week of right hemibody weakness.   There is also twitching of his body when he relaxes that is occurring more frequently recently. He is currently being weaned off of lamotrigine by his psychiatrist due to these symptoms.   He has had migraines in the past, but the last one was years ago. He was found to have a B12 deficiency for a ED visit for a migraine in the past and is on supplementation now.     5/20/21:  He was working with PT for cervical radiculopathy and this was helpful. After 2.5 weeks of PT, he noticed improvement of his left hemibody weakness. When he stopped doing PT, his weakness worsened.   On 4/25 he lost consciousness after a poor night's sleep and a long night of partying.   The weakness is triggered by sitting at the computer for a prolonged period.     Past medical history:   Past Medical History:   Diagnosis Date   • Asthma    • B12 deficiency 10/12/2018   • Eczema    • GERD (gastroesophageal reflux disease)    • BOB (obstructive sleep apnea) 10/12/2018       Past surgical history:   No past surgical history on file.    Family history:   No family history on file.    Social history:   Social History     Socioeconomic History   • Marital status: Single     Spouse name: Not on file   • Number of children: Not on file   • Years of " education: Not on file   • Highest education level: Not on file   Occupational History   • Not on file   Tobacco Use   • Smoking status: Former Smoker   • Smokeless tobacco: Never Used   Vaping Use   • Vaping Use: Never used   Substance and Sexual Activity   • Alcohol use: Not Currently   • Drug use: Yes     Types: Marijuana, Inhaled     Comment: THC   • Sexual activity: Not on file   Other Topics Concern   • Not on file   Social History Narrative   • Not on file     Social Determinants of Health     Financial Resource Strain:    • Difficulty of Paying Living Expenses:    Food Insecurity:    • Worried About Running Out of Food in the Last Year:    • Ran Out of Food in the Last Year:    Transportation Needs:    • Lack of Transportation (Medical):    • Lack of Transportation (Non-Medical):    Physical Activity:    • Days of Exercise per Week:    • Minutes of Exercise per Session:    Stress:    • Feeling of Stress :    Social Connections:    • Frequency of Communication with Friends and Family:    • Frequency of Social Gatherings with Friends and Family:    • Attends [employer] Services:    • Active Member of Clubs or Organizations:    • Attends Club or Organization Meetings:    • Marital Status:    Intimate Partner Violence:    • Fear of Current or Ex-Partner:    • Emotionally Abused:    • Physically Abused:    • Sexually Abused:        Current medications:   Current Outpatient Medications   Medication   • albuterol 108 (90 Base) MCG/ACT Aero Soln inhalation aerosol   • guanFACINE (TENEX) 1 MG Tab   • fluocinonide (LIDEX) 0.05 % Ointment   • LamoTRIgine 250 MG TABLET SR 24 HR     No current facility-administered medications for this visit.       Medication Allergy:  Allergies   Allergen Reactions   • Ibuprofen Swelling       Physical examination:   Vitals:    05/20/21 1120   BP: 110/72   BP Location: Left arm   Patient Position: Sitting   BP Cuff Size: Adult   Pulse: 85   Resp: 16   Temp: 37.3 °C (99.1 °F)   TempSrc:  "Temporal   SpO2: 95%   Weight: 74.9 kg (165 lb 2 oz)   Height: 1.803 m (5' 11\")     Neurological Exam  Mental Status  Awake and alert. Speech is normal. Language is fluent with no aphasia.    Motor                                               Right                     Left   Shoulder abduction               5                          5  Elbow flexion                         5                          5  Elbow extension                    5                          5  Wrist extension                      5                          5  Finger flexion                         5                          5  Hip abduction                         5                          5  Knee extension                      5                          5  Plantarflexion                         5                          5  Dorsiflexion                            5                          5    Reflexes                                           Right                      Left  Brachioradialis                    2+                         2+  Biceps                                 2+                         2+  Triceps                                2+                         2+  Patellar                                2+                         2+  Achilles                                2+                         2+       Labs:  None    Imagin21 CT HEAD/NECK: normal   21 MRI BRAIN W/O: I viewed the images personally. This is a normal scan.   3/12/21 MRI CERVICAL W/O:   I reviewed the images personally and agree with the following read:   IMPRESSION:     1.  C3-4 negligible right paramedian disc bulge. No central or foraminal stenosis.  2.  C6-C7 moderate-sized left paramedian disc protrusion with moderate sized component of left-sided caudad disc extrusion extending down to at least the mid C7 vertebral body level. Marked compromise of the left lateral recess.    ASSESSMENT AND PLAN:  Problem List Items Addressed This Visit "     None      Visit Diagnoses     Cervical radiculopathy at C7        Relevant Orders    REFERRAL TO PHYSICAL THERAPY          1. Cervical radiculopathy at C7  - REFERRAL TO PHYSICAL THERAPY    The patient describes improvement of his transient left arm/leg weakness with cervical traction exercises with PT. He has a disc bulge at C7 on recent MRI cervical spine that is causing left lateral recess compromise. His history suggests that this is responsible for his transient subjective arm weakness. I have counseled him that this should not cause leg weakness. His strength examination is normal in the office today, as it was at the previous visit.   Given improvement with PT, I have renewed his PT order per patient request. He will follow-up PRN.     FOLLOW-UP:   No follow-ups on file.    Total time spent for the day for this patient unrelated to procedure time is: 14 minutes. I spent 10 minutes in face to face time and I spent 1 minutes pre-charting and 3 minutes in post-visit documentation.      OLIVER GarciaO.  [facility] Neurology   Movement Disorders Specialist

## 2021-05-26 ENCOUNTER — PHYSICAL THERAPY (OUTPATIENT)
Dept: PHYSICAL THERAPY | Facility: MEDICAL CENTER | Age: 26
End: 2021-05-26
Attending: FAMILY MEDICINE
Payer: COMMERCIAL

## 2021-05-26 DIAGNOSIS — M54.12 CERVICAL RADICULOPATHY: ICD-10-CM

## 2021-05-26 PROCEDURE — 97110 THERAPEUTIC EXERCISES: CPT

## 2021-05-26 PROCEDURE — 97012 MECHANICAL TRACTION THERAPY: CPT

## 2021-05-26 NOTE — OP THERAPY DAILY TREATMENT
Outpatient Physical Therapy  DAILY TREATMENT     [facility]  [address]  [place] [address]  Phone:  [phone]  Fax:  [phone]    Date: 05/26/2021    Patient: Santo Hernandez  YOB: 1995  MRN: [id number]     Time Calculation    Start time: 1530  Stop time: 1615 Time Calculation (min): 45 minutes         Chief Complaint: No chief complaint on file.    Visit #: 5    SUBJECTIVE:  The last time I was in the hospital, I stopped doing the exercises and it was coming back .  OBJECTIVE:  Current objective measures:   Quickdash General Total Score: 29.55       Therapeutic Exercises (CPT 72429):     1. Supermans on swiss ball , 1 min x 3 reps    2. Education regarding standing frequently and not sitting for extended periods    3. Chin tuck     4. Bridge on swiss ball ,  20 sec x 10 reps     5. Added radian and medial nerve glides     8. UPOC due 5/27/21      Therapeutic Exercise Summary: Access Code: [email or web address]  URL: [email or web address]  Date: 04/15/2021  Prepared by: Shireen Robles    Exercises  Standing Shoulder External Rotation with Resistance - 5 x daily - 7 x weekly - 10 sets - 3 hold  Access Code: [email or web address]  URL: [email or web address]  Date: 04/19/2021  Prepared by: Shireen Robles    Exercises  Standing Shoulder External Rotation with Resistance - 5 x daily - 7 x weekly - 10 sets - 3 hold  Supine Chest Stretch on Foam Roll - 1 x daily - 7 x weekly - 10 reps - 3 sets  Thoracic Extension Mobilization on Foam Roll - 1 x daily - 7 x weekly - 10 reps - 3 sets  Snow [place] on Foam Roll - 1 x daily - 7 x weekly - 10 reps - 3 sets  Hooklying Scapular Protraction on Foam Roll - 1 x daily - 7 x weekly - 10 reps - 3 sets  Thoracic Foam Roll Mobilization Bench Press - 1 x daily - 7 x weekly - 10 reps - 3 sets    Access Code: [email or web address]  URL: [email or web address]  Date: 04/19/2021  Prepared by: Shireen Robles    Exercises  Standing  Shoulder External Rotation with Resistance - 5 x daily - 7 x weekly - 10 sets - 3 hold  Supine Chest Stretch on Foam Roll - 1 x daily - 7 x weekly - 10 reps - 3 sets  Thoracic Extension Mobilization on Foam Roll - 1 x daily - 7 x weekly - 10 reps - 3 sets  Snow [place] on Foam Roll - 1 x daily - 7 x weekly - 10 reps - 3 sets  Hooklying Scapular Protraction on Foam Roll - 1 x daily - 7 x weekly - 10 reps - 3 sets  Thoracic Foam Roll Mobilization Bench Press - 1 x daily - 7 x weekly - 10 reps - 3 sets  Standing Median Nerve Glide - 1 x daily - 7 x weekly - 1 sets - 5 reps - 1 hold  Standing Radial Nerve Glide - 1 x daily - 7 x weekly - 1 sets - 5 reps - 1 hold        Therapeutic Treatments and Modalities:     1. Mechanical Traction (CPT 58711), 15/9#  60/20 sec with heat x 1 5min, 80# left  strength tested immediately post traction    Time-based treatments/modalities:    Physical Therapy Timed Treatment Charges  Therapeutic exercise minutes (CPT 37722): 33 minutes        ASSESSMENT:   Response to treatment: see Progress note     PLAN/RECOMMENDATIONS:   Plan for treatment: therapy treatment to continue next visit.  Planned interventions for next visit: continue with current treatment. See progress note

## 2021-05-26 NOTE — OP THERAPY PROGRESS SUMMARY
Outpatient Physical Therapy  PROGRESS SUMMARY NOTE      [facility]patient Physical Therapy  [address]  [place] [address]  Phone:  [phone]  Fax:  [phone]    Date of Visit: 05/26/2021    Patient: Santo Hernandez  YOB: 1995  MRN: [id number]     Referring Provider: LORA Avendaño  [address] [address]  [place] [address]   Referring Diagnosis No admission diagnoses are documented for this encounter.     Visit Diagnoses     ICD-10-CM   1. Cervical radiculopathy  M54.12       Rehab Potential: fair    Progress Report Period: 4/15/21 - 5/26/21    Functional Assessment Used          Objective Findings and Assessment:   Patient progression towards goals: Patient has been inconsistent in his attendance to PT due to a trip to the ED and missing some appointments.  Patient does report his symptoms are improved after PT treatment.     Objective findings and assessment details: Patient reports his weakness and his loss of sensation is improving.  He reports his left side is 90% normal as compared to his right side.       Patient reports decreased incidence of numbness on his right UE and LE   Left Wrist/Hand    (2nd hand position)     Trial 1: 50  70    Trial 2: 40  75    Trial 3: 44   65    Average: 42.67     Right Wrist/Hand    (2nd hand position)     Trial 1: 80  90    Trial 2: 72  80    Trial 3: 65  85    Average: 72.33    Goals:       Long Term Goals:    Long Term Goals:    1. Patient will demonstrate improved left  strength by 10#. Goal  Met  New Goal -1.  Patient will report having improved sensation in his left upper extremity to result in decreased incidence of dropping items by 25%.   2. Patient will report the ability to be able to walk for at least 20 - 30 minutes daily without being limited by fatigue. Goal Met   New Goal2. Patient will report having less incidence of left lower extremity buckling to less than 2x per day.  3.  Patient will be independent in upgraded HEP with written instructions. Continue with goal      Long term goal time span:  4-6 weeks    Plan:   Planned therapy interventions:  Neuromuscular Re-education (CPT 60357), Mechanical Traction (CPT 78733), Therapeutic Exercise (CPT 27711), Therapeutic Activities (CPT 14118), E Stim Unattended (CPT 76496) and Manual Therapy (CPT 39160)  Frequency:  2x week  Duration in weeks:  6      Referring provider co-signature:  I have reviewed this plan of care and my co-signature certifies the need for services.     Certification Period: 05/26/2021 to 07/14/21    Physician Signature: ________________________________ Date: ______________

## 2021-06-09 ENCOUNTER — TELEPHONE (OUTPATIENT)
Dept: PHYSICAL THERAPY | Facility: MEDICAL CENTER | Age: 26
End: 2021-06-09

## 2021-06-09 ENCOUNTER — PHYSICAL THERAPY (OUTPATIENT)
Dept: PHYSICAL THERAPY | Facility: MEDICAL CENTER | Age: 26
End: 2021-06-09
Attending: PSYCHIATRY & NEUROLOGY
Payer: COMMERCIAL

## 2021-06-09 DIAGNOSIS — M54.12 CERVICAL RADICULOPATHY: ICD-10-CM

## 2021-06-09 PROCEDURE — 97110 THERAPEUTIC EXERCISES: CPT

## 2021-06-09 PROCEDURE — 97012 MECHANICAL TRACTION THERAPY: CPT

## 2021-06-09 NOTE — OP THERAPY DAILY TREATMENT
"  Outpatient Physical Therapy  DAILY TREATMENT     [facility] Outpatient Physical Therapy  [address]  [place] [address]  Phone:  [phone]  Fax:  [phone]    Date: 06/09/2021    Patient: Santo Hernandez  YOB: 1995  MRN: [id number]     Time Calculation    Start time: 1600  Stop time: 1630 Time Calculation (min): 30 minutes         Chief Complaint: No chief complaint on file.    Visit #: 6    SUBJECTIVE:  I had more pain in my neck and my leg since the last treatment session. I have not done any of the exercises since then. I felt really \"funny\" after I left here.     OBJECTIVE:  Current objective measures:   Patient reports he has the above symptoms for 2 weeks and is reporting leg pain which is not consistent with imaging.            Therapeutic Exercises (CPT 02417):     1. Supermans on swiss ball , 1 min x 3 reps    2. Education regarding standing frequently and not sitting for extended periods    3. Chin tuck     4. Bridge on swiss ball ,  20 sec x 10 reps     5. Added radian and medial nerve glides     8. UPOC due 5/27/21      Therapeutic Exercise Summary: Access Code: [email or web address]  URL: [email or web address]  Date: 04/15/2021  Prepared by: Shireen Robles    Exercises  Standing Shoulder External Rotation with Resistance - 5 x daily - 7 x weekly - 10 sets - 3 hold  Access Code: [email or web address]  URL: [email or web address]  Date: 04/19/2021  Prepared by: Shireen Robles    Exercises  Standing Shoulder External Rotation with Resistance - 5 x daily - 7 x weekly - 10 sets - 3 hold  Supine Chest Stretch on Foam Roll - 1 x daily - 7 x weekly - 10 reps - 3 sets  Thoracic Extension Mobilization on Foam Roll - 1 x daily - 7 x weekly - 10 reps - 3 sets  Snow [place] on Foam Roll - 1 x daily - 7 x weekly - 10 reps - 3 sets  Hooklying Scapular Protraction on Foam Roll - 1 x daily - 7 x weekly - 10 reps - 3 sets  Thoracic Foam Roll Mobilization Bench Press - 1 x daily - " 7 x weekly - 10 reps - 3 sets    Access Code: [email or web address]  URL: [email or web address]  Date: 04/19/2021  Prepared by: Shireen Robles    Exercises  Standing Shoulder External Rotation with Resistance - 5 x daily - 7 x weekly - 10 sets - 3 hold  Supine Chest Stretch on Foam Roll - 1 x daily - 7 x weekly - 10 reps - 3 sets  Thoracic Extension Mobilization on Foam Roll - 1 x daily - 7 x weekly - 10 reps - 3 sets  Snow [place] on Foam Roll - 1 x daily - 7 x weekly - 10 reps - 3 sets  Hooklying Scapular Protraction on Foam Roll - 1 x daily - 7 x weekly - 10 reps - 3 sets  Thoracic Foam Roll Mobilization Bench Press - 1 x daily - 7 x weekly - 10 reps - 3 sets  Standing Median Nerve Glide - 1 x daily - 7 x weekly - 1 sets - 5 reps - 1 hold  Standing Radial Nerve Glide - 1 x daily - 7 x weekly - 1 sets - 5 reps - 1 hold        Therapeutic Treatments and Modalities:     1. Mechanical Traction (CPT 06942), 15/9#  60/20 sec with heat x 1 5min, 80# left  strength tested immediately post traction    Time-based treatments/modalities:    Physical Therapy Timed Treatment Charges  Therapeutic exercise minutes (CPT 30665): 15 minutes    ASSESSMENT:   Response to treatment: Patient is reporting increased left UE and LE numbness and tingling after last treatment over 2 weeks ago which has not resolved. This does not correlate with imaging and based on him reporting increased discomfort then recommend patient follow up with MD and consider a referral to physiatry.      PLAN/RECOMMENDATIONS:   Plan for treatment: refer patient back to MD.

## 2021-06-09 NOTE — OP THERAPY DAILY TREATMENT
Dr. Alva,   I have been seeing Santo for his left sided weakness and numbness and tingling as referred from his neurologist. After the last visit, he has had an increase in numbness on his whole left side of his body again, including his LLE. This does not correlate with the imaging of his c/s spine.  Would you consider referring him to physiatry to determine if he would be appropriate for a cervical epidural  or further diagnostics?    Thank you,   Shireen Robles, PT, DPT

## 2021-06-14 ENCOUNTER — PHYSICAL THERAPY (OUTPATIENT)
Dept: PHYSICAL THERAPY | Facility: MEDICAL CENTER | Age: 26
End: 2021-06-14
Attending: PSYCHIATRY & NEUROLOGY
Payer: COMMERCIAL

## 2021-06-14 DIAGNOSIS — M54.12 CERVICAL RADICULOPATHY: ICD-10-CM

## 2021-06-14 PROCEDURE — 97012 MECHANICAL TRACTION THERAPY: CPT

## 2021-06-14 PROCEDURE — 97110 THERAPEUTIC EXERCISES: CPT

## 2021-06-14 NOTE — OP THERAPY DAILY TREATMENT
Outpatient Physical Therapy  DAILY TREATMENT     [facility]  [address]  [place] [address]  Phone:  [phone]  Fax:  [phone]    Date: 06/14/2021    Patient: Santo Hernandez  YOB: 1995  MRN: [id number]     Time Calculation    Start time: 1600  Stop time: 1640 Time Calculation (min): 40 minutes         Chief Complaint: No chief complaint on file.    Visit #: 7    SUBJECTIVE:  I am doing the exercises again. Some of the symptoms I told you about last time are gone.       OBJECTIVE:  Current objective measures:        Therapeutic Exercises (CPT 04652):     1. Supermans on swiss ball , 1 min x 3 reps    2. UBE 5 min forwards and backwards , switching directions every minute     3. Chin tuck     4. Bridge on swiss ball ,  20 sec x 10 reps     5. Added radian and medial nerve glides , 5 x no symptoms     6. Standing rows , green rows     7. Corner pec stretch     8. UPOC due 5/27/21      Therapeutic Exercise Summary: Access Code: [email or web address]  URL: [email or web address]  Date: 04/15/2021  Prepared by: Shireen Robles    Exercises  Standing Shoulder External Rotation with Resistance - 5 x daily - 7 x weekly - 10 sets - 3 hold  Access Code: [email or web address]  URL: [email or web address]  Date: 04/19/2021  Prepared by: Shireen Robles    Exercises  Standing Shoulder External Rotation with Resistance - 5 x daily - 7 x weekly - 10 sets - 3 hold  Supine Chest Stretch on Foam Roll - 1 x daily - 7 x weekly - 10 reps - 3 sets  Thoracic Extension Mobilization on Foam Roll - 1 x daily - 7 x weekly - 10 reps - 3 sets  Snow [place] on Foam Roll - 1 x daily - 7 x weekly - 10 reps - 3 sets  Hooklying Scapular Protraction on Foam Roll - 1 x daily - 7 x weekly - 10 reps - 3 sets  Thoracic Foam Roll Mobilization Bench Press - 1 x daily - 7 x weekly - 10 reps - 3 sets    Access Code: [email or web address]  URL: [email or web address]  Date: 04/19/2021  Prepared by:  Shireen Robles    Exercises  Standing Shoulder External Rotation with Resistance - 5 x daily - 7 x weekly - 10 sets - 3 hold  Supine Chest Stretch on Foam Roll - 1 x daily - 7 x weekly - 10 reps - 3 sets  Thoracic Extension Mobilization on Foam Roll - 1 x daily - 7 x weekly - 10 reps - 3 sets  [place] on Foam Roll - 1 x daily - 7 x weekly - 10 reps - 3 sets  Hooklying Scapular Protraction on Foam Roll - 1 x daily - 7 x weekly - 10 reps - 3 sets  Thoracic Foam Roll Mobilization Bench Press - 1 x daily - 7 x weekly - 10 reps - 3 sets  Standing Median Nerve Glide - 1 x daily - 7 x weekly - 1 sets - 5 reps - 1 hold  Standing Radial Nerve Glide - 1 x daily - 7 x weekly - 1 sets - 5 reps - 1 hold        Therapeutic Treatments and Modalities:     1. Mechanical Traction (CPT 66537), 15/9#  60/20 sec with heat x 1 5min    Time-based treatments/modalities:    Physical Therapy Timed Treatment Charges  Therapeutic exercise minutes (CPT 88788): 21 minutes      ASSESSMENT:   Response to treatment:   Patient tolerated treatment well and reports no UE symptoms post treatment.   PLAN/RECOMMENDATIONS:   Plan for treatment: therapy treatment to continue next visit.  Planned interventions for next visit: continue with current treatment.

## 2021-06-25 ENCOUNTER — TELEPHONE (OUTPATIENT)
Dept: MEDICAL GROUP | Facility: MEDICAL CENTER | Age: 26
End: 2021-06-25

## 2021-06-25 NOTE — TELEPHONE ENCOUNTER
Patient has not been informed of our no show policy as the phone number on file has no voicemail set up/voicemail is full. No show letter will be sent. 6/25/21 jh

## 2021-06-28 ENCOUNTER — PHYSICAL THERAPY (OUTPATIENT)
Dept: PHYSICAL THERAPY | Facility: MEDICAL CENTER | Age: 26
End: 2021-06-28
Attending: PSYCHIATRY & NEUROLOGY
Payer: COMMERCIAL

## 2021-06-28 DIAGNOSIS — M54.12 CERVICAL RADICULOPATHY: ICD-10-CM

## 2021-06-28 PROCEDURE — 97110 THERAPEUTIC EXERCISES: CPT

## 2021-06-28 PROCEDURE — 97012 MECHANICAL TRACTION THERAPY: CPT

## 2021-06-28 NOTE — OP THERAPY DAILY TREATMENT
Outpatient Physical Therapy  DAILY TREATMENT     [facility]  [address]  [place] [address]  Phone:  [phone]  Fax:  [phone]    Date: 06/28/2021    Patient: Santo Hernandez  YOB: 1995  MRN: [id number]     Time Calculation    Start time: 1430  Stop time: 1515 Time Calculation (min): 45 minutes         Chief Complaint: No chief complaint on file.    Visit #: 8    SUBJECTIVE:  I have an appt with my psychiatrist in Mid July.     OBJECTIVE:  Current objective measures:         Therapeutic Exercises (CPT 21924):     1. Standing repeated extension , 1 x 10 , patient reports this decreases his lower extremity symptoms , switching directions every minute     3. Chin tuck     4. Bridge on swiss ball ,  20 sec x 10 reps     5. Added radian and medial nerve glides , d/c due to patient feeling that this aggravates it, green rows     7. Corner pec stretch     8. UPOC due 5/27/21      Therapeutic Exercise Summary: Access Code: [email or web address]  URL: [email or web address]  Date: 04/15/2021  Prepared by: Shireen Robles    Exercises  Standing Shoulder External Rotation with Resistance - 5 x daily - 7 x weekly - 10 sets - 3 hold  Access Code: [email or web address]  URL: [email or web address]  Date: 04/19/2021  Prepared by: Shireen Robles    Exercises  Standing Shoulder External Rotation with Resistance - 5 x daily - 7 x weekly - 10 sets - 3 hold  Supine Chest Stretch on Foam Roll - 1 x daily - 7 x weekly - 10 reps - 3 sets  Thoracic Extension Mobilization on Foam Roll - 1 x daily - 7 x weekly - 10 reps - 3 sets  Snow [place] on Foam Roll - 1 x daily - 7 x weekly - 10 reps - 3 sets  Hooklying Scapular Protraction on Foam Roll - 1 x daily - 7 x weekly - 10 reps - 3 sets  Thoracic Foam Roll Mobilization Bench Press - 1 x daily - 7 x weekly - 10 reps - 3 sets    Access Code: [email or web address]  URL: [email or web address]  Date: 04/19/2021  Prepared by: Shireen  Jeany    Exercises  Standing Shoulder External Rotation with Resistance - 5 x daily - 7 x weekly - 10 sets - 3 hold  Supine Chest Stretch on Foam Roll - 1 x daily - 7 x weekly - 10 reps - 3 sets  Thoracic Extension Mobilization on Foam Roll - 1 x daily - 7 x weekly - 10 reps - 3 sets  Snow [place] on Foam Roll - 1 x daily - 7 x weekly - 10 reps - 3 sets  Hooklying Scapular Protraction on Foam Roll - 1 x daily - 7 x weekly - 10 reps - 3 sets  Thoracic Foam Roll Mobilization Bench Press - 1 x daily - 7 x weekly - 10 reps - 3 sets  Standing Median Nerve Glide - 1 x daily - 7 x weekly - 1 sets - 5 reps - 1 hold  Standing Radial Nerve Glide - 1 x daily - 7 x weekly - 1 sets - 5 reps - 1 hold        Therapeutic Treatments and Modalities:     1. Mechanical Traction (CPT 87528), 15/9#  60/20 sec with heat x 1 5min    Time-based treatments/modalities:    Physical Therapy Timed Treatment Charges  Therapeutic exercise minutes (CPT 93916): 23 minutes      ASSESSMENT:   Response to treatment: Discus/chrissie with patient regarding no show and cancellation policy. Candid discussion with patient regarding bipolar diagnosis and transparency with clinicians in order to provide best treatment plan.  Patient verbalizes good understanding.   Hyper reflexic bilateral patella and gastroc   - Hoffmans  Non reactive babinski  2 beats clonus right gastroc     PLAN/RECOMMENDATIONS:   Plan for treatment: therapy treatment to continue next visit.  1 more PT visit and then plan to d/c to HEP. Patient no showed PCP appt for referral to physiatry.    Planned interventions for next visit: continue with current treatment.

## 2021-06-30 ENCOUNTER — PHYSICAL THERAPY (OUTPATIENT)
Dept: PHYSICAL THERAPY | Facility: MEDICAL CENTER | Age: 26
End: 2021-06-30
Attending: PSYCHIATRY & NEUROLOGY
Payer: COMMERCIAL

## 2021-06-30 DIAGNOSIS — M54.12 CERVICAL RADICULOPATHY: ICD-10-CM

## 2021-06-30 PROCEDURE — 97012 MECHANICAL TRACTION THERAPY: CPT

## 2021-06-30 PROCEDURE — 97110 THERAPEUTIC EXERCISES: CPT

## 2021-06-30 NOTE — OP THERAPY DAILY TREATMENT
Outpatient Physical Therapy  DAILY TREATMENT     [facility] Outpatient Physical Therapy  [address]  [place] [address]  Phone:  [phone]  Fax:  [phone]    Date: 06/30/2021    Patient: Santo Heranndez  YOB: 1995  MRN: [id number]     Time Calculation    Start time: 1530  Stop time: 1610 Time Calculation (min): 40 minutes         Chief Complaint: No chief complaint on file.    Visit #: 9    SUBJECTIVE:  I think the standing extension is helpful at this time.     OBJECTIVE:  Current objective measures:      New Goal -1.  Patient will report having improved sensation in his left upper extremity to result in decreased incidence of dropping items by 25%. Goal Met   2. Patient will report the ability to be able to walk for at least 20 - 30 minutes daily without being limited by fatigue. Goal Met   New Goal2. Patient will report having less incidence of left lower extremity buckling to less than 2x per day. Goal met, patient denies buckling, he reports he will catch his toes.    3. Patient will be independent in upgraded HEP with written instructions. Goal Met      Left Wrist/Hand    (2nd hand position)     Trial 1: 50  70   80, 75, 60    Trial 2: 40  75    Trial 3: 44   65    Average: 42.67     Right Wrist/Hand    (2nd hand position)     Trial 1: 80  90   85,89,78    Trial 2: 72  80    Trial 3: 65  85    Average: 72.33    Therapeutic Exercises (CPT 14281):     1. Standing repeated extension , 1 x 10 , patient reports this decreases his lower extremity symptoms , switching directions every minute     3. Chin tuck     4. Bridge on swiss ball ,  20 sec x 10 reps     5. Added radian and medial nerve glides , d/c due to patient feeling that this aggravates it, green rows     7. Corner pec stretch     8. UPOC due 5/27/21      Therapeutic Exercise Summary: Access Code: [email or web address]  URL: [email or web address]  Date: 04/15/2021  Prepared by: Shireen  Christenickkelly    Exercises  Standing Shoulder External Rotation with Resistance - 5 x daily - 7 x weekly - 10 sets - 3 hold  Access Code: [email or web address]  URL: [email or web address]  Date: 04/19/2021  Prepared by: Shireen Robles    Exercises  Standing Shoulder External Rotation with Resistance - 5 x daily - 7 x weekly - 10 sets - 3 hold  Supine Chest Stretch on Foam Roll - 1 x daily - 7 x weekly - 10 reps - 3 sets  Thoracic Extension Mobilization on Foam Roll - 1 x daily - 7 x weekly - 10 reps - 3 sets  Snow [place] on Foam Roll - 1 x daily - 7 x weekly - 10 reps - 3 sets  Hooklying Scapular Protraction on Foam Roll - 1 x daily - 7 x weekly - 10 reps - 3 sets  Thoracic Foam Roll Mobilization Bench Press - 1 x daily - 7 x weekly - 10 reps - 3 sets    Access Code: [email or web address]  URL: [email or web address]  Date: 04/19/2021  Prepared by: Shireen Robles    Exercises  Standing Shoulder External Rotation with Resistance - 5 x daily - 7 x weekly - 10 sets - 3 hold  Supine Chest Stretch on Foam Roll - 1 x daily - 7 x weekly - 10 reps - 3 sets  Thoracic Extension Mobilization on Foam Roll - 1 x daily - 7 x weekly - 10 reps - 3 sets  Snow [place] on Foam Roll - 1 x daily - 7 x weekly - 10 reps - 3 sets  Hooklying Scapular Protraction on Foam Roll - 1 x daily - 7 x weekly - 10 reps - 3 sets  Thoracic Foam Roll Mobilization Bench Press - 1 x daily - 7 x weekly - 10 reps - 3 sets  Standing Median Nerve Glide - 1 x daily - 7 x weekly - 1 sets - 5 reps - 1 hold  Standing Radial Nerve Glide - 1 x daily - 7 x weekly - 1 sets - 5 reps - 1 hold        Therapeutic Treatments and Modalities:     1. Mechanical Traction (CPT 95566), 15/9#  60/20 sec with heat x 1 5min    Time-based treatments/modalities:    Physical Therapy Timed Treatment Charges  Therapeutic exercise minutes (CPT 00828): 20 minutes          ASSESSMENT:   Response to treatment:  Encouraged patient to use standing desk at home in order to take pressure off his low back.        PLAN/RECOMMENDATIONS:   Plan for treatment:     Patient continues to demonstrate lower extremity hyperreflexia, right gastroc 2 beat clonus, tremors in bilateral UE and left LE weakness. Patient referred back to MD and to possible physiatry appt for further diagnostics. D/C from PT at this time.

## 2021-07-13 ENCOUNTER — OFFICE VISIT (OUTPATIENT)
Dept: MEDICAL GROUP | Facility: MEDICAL CENTER | Age: 26
End: 2021-07-13
Payer: COMMERCIAL

## 2021-07-13 VITALS
WEIGHT: 168.4 LBS | RESPIRATION RATE: 18 BRPM | SYSTOLIC BLOOD PRESSURE: 102 MMHG | TEMPERATURE: 99.5 F | BODY MASS INDEX: 23.57 KG/M2 | HEART RATE: 76 BPM | OXYGEN SATURATION: 95 % | HEIGHT: 71 IN | DIASTOLIC BLOOD PRESSURE: 62 MMHG

## 2021-07-13 DIAGNOSIS — L30.9 ECZEMA, UNSPECIFIED TYPE: ICD-10-CM

## 2021-07-13 DIAGNOSIS — M54.12 CERVICAL RADICULOPATHY AT C7: ICD-10-CM

## 2021-07-13 PROCEDURE — 99213 OFFICE O/P EST LOW 20 MIN: CPT | Performed by: FAMILY MEDICINE

## 2021-07-13 RX ORDER — FLUTICASONE PROPIONATE 50 MCG
1 SPRAY, SUSPENSION (ML) NASAL DAILY
COMMUNITY
End: 2022-02-09

## 2021-07-13 RX ORDER — CETIRIZINE HYDROCHLORIDE 10 MG/1
10 TABLET ORAL DAILY
COMMUNITY

## 2021-07-13 ASSESSMENT — FIBROSIS 4 INDEX: FIB4 SCORE: 0.5

## 2021-07-13 NOTE — ASSESSMENT & PLAN NOTE
Patient's neurologist recently diagnosed him with cervical radiculopathy with limited improvement on physical therapy.  He was wondering if he could see physiatry.

## 2021-07-13 NOTE — ASSESSMENT & PLAN NOTE
The patient gets intermittent eczema that tends to respond to some prescription cream but he cannot remember the name.  He was asking me to prescribe it.

## 2021-07-13 NOTE — PROGRESS NOTES
"[facility]  Progress Note  Established Patient    Subjective:   Santo Hernandez is a 26 y.o. male here today with a chief complaint of cervical radiculopathy. The patient is alone.     Eczema  The patient gets intermittent eczema that tends to respond to some prescription cream but he cannot remember the name.  He was asking me to prescribe it.    Cervical radiculopathy at C7  Patient's neurologist recently diagnosed him with cervical radiculopathy with limited improvement on physical therapy.  He was wondering if he could see physiatry.      Current Outpatient Medications on File Prior to Visit   Medication Sig Dispense Refill   • cetirizine (ZYRTEC) 10 MG Tab Take 10 mg by mouth every day.     • fluticasone (FLONASE) 50 MCG/ACT nasal spray Administer 1 Spray into affected nostril(S) every day.     • albuterol 108 (90 Base) MCG/ACT Aero Soln inhalation aerosol Inhale 2 Puffs every 6 hours as needed for Shortness of Breath. 8.5 g 3   • Guanfacine HCl 2 MG Tab Take 2 mg by mouth at bedtime.     • fluocinonide (LIDEX) 0.05 % Ointment Apply  to affected area(s) 2 times a day.     • LamoTRIgine 250 MG TABLET SR 24 HR Once a day (Patient not taking: Reported on 5/20/2021)       No current facility-administered medications on file prior to visit.          Objective:     Vitals:    07/13/21 1406   BP: 102/62   BP Location: Left arm   Patient Position: Sitting   BP Cuff Size: Adult long   Pulse: 76   Resp: 18   Temp: 37.5 °C (99.5 °F)   TempSrc: Temporal   SpO2: 95%   Weight: 76.4 kg (168 lb 6.4 oz)   Height: 1.803 m (5' 11\")       Physical Exam:  General: alert in no apparent distress.   Skin: hand eczema.         Assessment and Plan:     1. Cervical radiculopathy at C7  - REFERRAL TO OUTPATIENT INTERVENTIONAL PAIN CLINIC    2. Eczema, unspecified type  -I asked patient to go home and find out the name of the cream and send it to me via [employer].  I may be able to prescribe it.        Followup: Return if " symptoms worsen or fail to improve.

## 2021-07-14 ENCOUNTER — PATIENT MESSAGE (OUTPATIENT)
Dept: MEDICAL GROUP | Facility: MEDICAL CENTER | Age: 26
End: 2021-07-14

## 2021-07-14 RX ORDER — TRIAMCINOLONE ACETONIDE 1 MG/G
CREAM TOPICAL
Qty: 60 G | Refills: 1 | Status: SHIPPED | OUTPATIENT
Start: 2021-07-14

## 2021-08-12 ENCOUNTER — OFFICE VISIT (OUTPATIENT)
Dept: PHYSICAL MEDICINE AND REHAB | Facility: MEDICAL CENTER | Age: 26
End: 2021-08-12
Payer: COMMERCIAL

## 2021-08-12 VITALS
WEIGHT: 169.09 LBS | HEART RATE: 87 BPM | BODY MASS INDEX: 23.67 KG/M2 | DIASTOLIC BLOOD PRESSURE: 68 MMHG | TEMPERATURE: 98.5 F | HEIGHT: 71 IN | OXYGEN SATURATION: 97 % | SYSTOLIC BLOOD PRESSURE: 124 MMHG

## 2021-08-12 DIAGNOSIS — R20.0 LEFT LEG NUMBNESS: ICD-10-CM

## 2021-08-12 DIAGNOSIS — G89.29 CHRONIC BILATERAL LOW BACK PAIN, UNSPECIFIED WHETHER SCIATICA PRESENT: ICD-10-CM

## 2021-08-12 DIAGNOSIS — R29.898 LEFT LEG WEAKNESS: ICD-10-CM

## 2021-08-12 DIAGNOSIS — M54.50 CHRONIC BILATERAL LOW BACK PAIN, UNSPECIFIED WHETHER SCIATICA PRESENT: ICD-10-CM

## 2021-08-12 DIAGNOSIS — R25.1 TREMOR: ICD-10-CM

## 2021-08-12 DIAGNOSIS — R29.2 HYPERREFLEXIA: ICD-10-CM

## 2021-08-12 DIAGNOSIS — M54.12 CERVICAL RADICULOPATHY: ICD-10-CM

## 2021-08-12 DIAGNOSIS — M54.16 LUMBAR RADICULOPATHY: ICD-10-CM

## 2021-08-12 PROCEDURE — 99204 OFFICE O/P NEW MOD 45 MIN: CPT | Performed by: PHYSICAL MEDICINE & REHABILITATION

## 2021-08-12 ASSESSMENT — PATIENT HEALTH QUESTIONNAIRE - PHQ9
5. POOR APPETITE OR OVEREATING: 1 - SEVERAL DAYS
SUM OF ALL RESPONSES TO PHQ QUESTIONS 1-9: 6
CLINICAL INTERPRETATION OF PHQ2 SCORE: 1

## 2021-08-12 ASSESSMENT — FIBROSIS 4 INDEX: FIB4 SCORE: 0.5

## 2021-08-12 ASSESSMENT — PAIN SCALES - GENERAL: PAINLEVEL: 2=MINIMAL-SLIGHT

## 2021-08-12 NOTE — H&P (VIEW-ONLY)
New patient note    Physiatry (physical medicine and  Rehabilitation), interventional spine and sports medicine    Date of service: See epic    Chief complaint:   Chief Complaint   Patient presents with   • New Patient     Neck pain        Referring provider: Clayton Ferrell M.D.     HISTORY    HPI: Santo Hernandez 26 y.o.  who presents today with Diagnoses of Cervical radiculopathy, Chronic bilateral low back pain, unspecified whether sciatica present, Lumbar radiculopathy, Left leg numbness, Left leg weakness, Hyperreflexia, and Tremor were pertinent to this visit.    HPI    Chronic bilateral neck pain radiating to the left arm with left arm weakness and numbness.  The patient is also had a tremor of multiple extremities.  He is followed by neurology and his PCP and has been to physical therapy.  Physical therapy had exacerbated his symptoms so this was stopped for now until he gets further treatments and diagnoses. Moderate intensity. Constant.     He also has a separate issue with low back pain radiating down the left leg with associated numbness and tingling and weakness.  This is been chronic.  This is not improved with physical therapy and has gotten worse. Moderate intensity. Constant.        Psychological testing for pain as depression and pain commonly coexist and need to both be treated.     Opioid Risk Score: 7      Interpretation of Opioid Risk Score   Score 0-3 = Low risk of abuse. Do UDS at least once per year.  Score 4-7 = Moderate risk of abuse. Do UDS 1-4 times per year.  Score 8+ = High risk of abuse. Refer to specialist.    PHQ  Depression Screen (PHQ-2/PHQ-9) 1/24/2019 3/2/2021 8/12/2021   PHQ-2 Total Score 0 0 1   PHQ-9 Total Score - - 6       Interpretation of PHQ-9 Total Score   Score Severity   1-4 No Depression   5-9 Mild Depression   10-14 Moderate Depression   15-19 Moderately Severe Depression   20-27 Severe Depression     Medical records review:  I reviewed the note from the  referring provider Clayton Ferrell M.D. including the note dated 7/13/2021 where the patient was seen for concern for cervical radiculopathy.  I also reviewed previous notes for physical therapy from Shireen Robles, PT, DPT.  Include most recent note from 6/30/2021.  Traction was done.  Therapeutic exercise was done.  Lateral progression with physical therapy and was referred back to physicians..       I reviewed the notes from Dr. Blackwood of neurology including most recent note from 5/20/2021.  At that time the patient was diagnosed with cervical radiculopathy involving C7.  Physical therapy was ordered and the patient was to follow-up as needed.    ROS:   Red Flags ROS:   Fever, Chills, Sweats: Denies  Involuntary Weight Loss: Denies  Bladder Incontinence: Denies  Bowel Incontinence: denies  Saddle Anesthesia: Denies    All other systems reviewed and negative.       PMHx:   Past Medical History:   Diagnosis Date   • Asthma    • B12 deficiency 10/12/2018   • Eczema    • GERD (gastroesophageal reflux disease)    • BOB (obstructive sleep apnea) 10/12/2018         Current Outpatient Medications on File Prior to Visit   Medication Sig Dispense Refill   • cetirizine (ZYRTEC) 10 MG Tab Take 10 mg by mouth every day.     • fluticasone (FLONASE) 50 MCG/ACT nasal spray Administer 1 Spray into affected nostril(S) every day.     • albuterol 108 (90 Base) MCG/ACT Aero Soln inhalation aerosol Inhale 2 Puffs every 6 hours as needed for Shortness of Breath. 8.5 g 3   • Guanfacine HCl 2 MG Tab Take 2 mg by mouth at bedtime.     • fluocinonide (LIDEX) 0.05 % Ointment Apply  to affected area(s) 2 times a day.     • triamcinolone acetonide (KENALOG) 0.1 % Cream Apply a thin layer to rash BID PRN. Do not use longer than 7 days straight. 60 g 1   • LamoTRIgine 250 MG TABLET SR 24 HR Once a day (Patient not taking: Reported on 8/12/2021)       No current facility-administered medications on file prior to visit.        PSHx:   History  reviewed. No pertinent surgical history.    Family history   History reviewed. No pertinent family history.      Medications: reviewed on epic.   Outpatient Medications Marked as Taking for the 8/12/21 encounter (Office Visit) with Schuyelr Hussein M.D.   Medication Sig Dispense Refill   • cetirizine (ZYRTEC) 10 MG Tab Take 10 mg by mouth every day.     • fluticasone (FLONASE) 50 MCG/ACT nasal spray Administer 1 Spray into affected nostril(S) every day.     • albuterol 108 (90 Base) MCG/ACT Aero Soln inhalation aerosol Inhale 2 Puffs every 6 hours as needed for Shortness of Breath. 8.5 g 3   • Guanfacine HCl 2 MG Tab Take 2 mg by mouth at bedtime.     • fluocinonide (LIDEX) 0.05 % Ointment Apply  to affected area(s) 2 times a day.          Allergies:   Allergies   Allergen Reactions   • Ibuprofen Swelling       Social Hx:   Social History     Socioeconomic History   • Marital status: Single     Spouse name: Not on file   • Number of children: Not on file   • Years of education: Not on file   • Highest education level: Not on file   Occupational History   • Not on file   Tobacco Use   • Smoking status: Former Smoker   • Smokeless tobacco: Never Used   Vaping Use   • Vaping Use: Never used   Substance and Sexual Activity   • Alcohol use: Not Currently   • Drug use: Yes     Types: Marijuana, Inhaled     Comment: THC   • Sexual activity: Not on file   Other Topics Concern   •  Service No   • Blood Transfusions No   • Caffeine Concern No   • Occupational Exposure No   • Hobby Hazards No   • Sleep Concern No   • Stress Concern No   • Weight Concern No   • Special Diet No   • Back Care No   • Exercise Yes   • Bike Helmet Yes   • Seat Belt Yes   • Self-Exams No   Social History Narrative   • Not on file     Social Determinants of Health     Financial Resource Strain:    • Difficulty of Paying Living Expenses:    Food Insecurity:    • Worried About Running Out of Food in the Last Year:    • Ran Out of Food in the  "Last Year:    Transportation Needs:    • Lack of Transportation (Medical):    • Lack of Transportation (Non-Medical):    Physical Activity:    • Days of Exercise per Week:    • Minutes of Exercise per Session:    Stress:    • Feeling of Stress :    Social Connections:    • Frequency of Communication with Friends and Family:    • Frequency of Social Gatherings with Friends and Family:    • Attends [employer] Services:    • Active Member of Clubs or Organizations:    • Attends Club or Organization Meetings:    • Marital Status:    Intimate Partner Violence:    • Fear of Current or Ex-Partner:    • Emotionally Abused:    • Physically Abused:    • Sexually Abused:          EXAMINATION     Physical Exam:   Vitals: /68 (BP Location: Right arm, Patient Position: Sitting, BP Cuff Size: Small adult)   Pulse 87   Temp 36.9 °C (98.5 °F)   Ht 1.803 m (5' 11\")   Wt 76.7 kg (169 lb 1.5 oz)   SpO2 97%     Constitutional:   Body Habitus: Body mass index is 23.58 kg/m².  Cooperation: Fully cooperates with exam  Appearance: Well-groomed, well-nourished, not disheveled     Eyes: No scleral icterus to suggest severe liver disease, no proptosis to suggest severe hyperthyroid    ENT -no obvious auditory deficits, no obvious tongue lesions, tongue midline, no facial droop     Skin -no rashes or lesions noted     Respiratory-  breathing comfortable on room air, no audible wheezing    Cardiovascular- capillary refills less than 2 seconds. No lower extremity edema is noted.     Psychiatric- alert and oriented ×3. Normal affect.     Gait - normal gait, no use of ambulatory device, nonantalgic.     Musculoskeletal and Neuro -       Cervical spine   Inspection: No deformities of the skin over the cervical spine. No rashes or lesions.    decreased  active range of motion in all directions, with  pain      Spurling’s sign: negative right, positive left    No signs of muscular atrophy in bilateral upper extremities     No tenderness to " palpation of the cervical spine      Key points for the international standards for neurological classification of spinal cord injury (ISNCSCI) to light touch.     Dermatome R L   C4 2 2   C5 2 2   C6 2 2   C7 2 1   C8 2 1   T1 2 2   T2 2 2                                     Motor Exam Upper Extremities   ? Myotome R L   Shoulder flexion C5 5 5   Elbow flexion C5 5 5   Wrist extension C6 5 5   Elbow extension C7 5 4   Finger flexion C8 5 5   Finger abduction T1 5 5     Reflexes  ?  R L   Biceps  2+ 3+   Brachioradialis  2+ 3+     Guido’s sign negative right, positive left            Thoracic/Lumbar Spine/Sacral Spine/Hips   Inspection: No evidence of atrophy in bilateral lower extremities throughout     ROM: decreased active range of motion with flexion, lateral flexion, and rotation bilaterally.   There is decreased active range of motion with lumbar extension with pain.      Palpation:   No tenderness to palpation in midline at T1-T12 levels. No tenderness to palpation in the left and right of the midline T1-L5, NEGATIVE for tenderness to palpation to the para-midline region in the lower lumbar levels.  palpation over SI joint: negative bilaterally    palpation in hip or over the gluteus medius tendon insertion: negative bilaterally      Lumbar spine Special tests  Neuro tension  Straight leg test negative right, positive left    Slump test negative right, positive left      HIP  FAIR test negative bilaterally    Range of motion in the RIGHT hip is full  in flexion, extension, abduction, internal rotation, external rotation.  Range of motion in the LEFT hip is full  in flexion, extension, abduction, internal rotation, external rotation.      SI joint tests  Observation patient sits on one buttocks: Negative  SI joint compression negative bilaterally    SI joint distraction negative bilaterally    Thigh thrust test negative bilaterally    ASHLIE test negative bilaterally                 Key points for the  international standards for neurological classification of spinal cord injury (ISNCSCI) to light touch.     Dermatome R L                                      L2 2 2   L3 2 2   L4 2 1   L5 2 1   S1 2 2   S2 2 2       Motor Exam Lower Extremities    ? Myotome R L   Hip flexion L2 5 5   Knee extension L3 5 5   Ankle dorsiflexion L4 5 5   Toe extension L5 5 4   Ankle plantarflexion S1 5 5         Reflexes  ?  R L                Patella  3+ 3+   Achilles   3+ 3+       Babinski sign negative bilaterally   Clonus of the ankle positive bilaterally       MEDICAL DECISION MAKING    Medical records review: see under HPI section.     DATA    Labs:   Lab Results   Component Value Date/Time    SODIUM 138 04/25/2021 10:27 AM    POTASSIUM 3.8 04/25/2021 10:27 AM    CHLORIDE 105 04/25/2021 10:27 AM    CO2 27 04/25/2021 10:27 AM    ANION 6.0 (L) 04/25/2021 10:27 AM    GLUCOSE 112 (H) 04/25/2021 10:27 AM    BUN 14 04/25/2021 10:27 AM    CREATININE 0.91 04/25/2021 10:27 AM    CALCIUM 8.4 (L) 04/25/2021 10:27 AM    ASTSGOT 23 04/25/2021 10:27 AM    ALTSGPT 23 04/25/2021 10:27 AM    TBILIRUBIN 0.2 04/25/2021 10:27 AM    ALBUMIN 3.5 04/25/2021 10:27 AM    TOTPROTEIN 5.7 (L) 04/25/2021 10:27 AM    GLOBULIN 2.2 04/25/2021 10:27 AM    AGRATIO 1.6 04/25/2021 10:27 AM   ]    No results found for: PROTHROMBTM, INR     Lab Results   Component Value Date/Time    WBC 10.1 04/25/2021 10:27 AM    RBC 4.65 (L) 04/25/2021 10:27 AM    HEMOGLOBIN 14.3 04/25/2021 10:27 AM    HEMATOCRIT 44.0 04/25/2021 10:27 AM    MCV 94.6 04/25/2021 10:27 AM    MCH 30.8 04/25/2021 10:27 AM    MCHC 32.5 (L) 04/25/2021 10:27 AM    MPV 10.1 04/25/2021 10:27 AM    NEUTSPOLYS 49.60 04/25/2021 10:27 AM    LYMPHOCYTES 31.30 04/25/2021 10:27 AM    MONOCYTES 6.60 04/25/2021 10:27 AM    EOSINOPHILS 11.50 (H) 04/25/2021 10:27 AM    BASOPHILS 0.60 04/25/2021 10:27 AM        No results found for: HBA1C     Imaging:   I personally reviewed following images, these are my reads  MRI  cervical spine 3/12/2021 there is a left paracentral disc herniation at C6-7 consistent with a left C7 radiculopathy.        IMAGING radiology reads. I reviewed the following radiology reads   CT cervical spine 4/25/2021  IMPRESSION:     No fracture or subluxation is seen in the cexrvical spine.        Results for orders placed during the hospital encounter of 02/20/21    MR-BRAIN-W/O    Impression  MRI of the brain without contrast within normal limits.             Results for orders placed during the hospital encounter of 03/12/21    MR-CERVICAL SPINE-W/O    Impression  1.  C3-4 negligible right paramedian disc bulge. No central or foraminal stenosis.  2.  C6-C7 moderate-sized left paramedian disc protrusion with moderate sized component of left-sided caudad disc extrusion extending down to at least the mid C7 vertebral body level. Marked compromise of the left lateral recess.                                                                                               Diagnosis   Visit Diagnoses     ICD-10-CM   1. Cervical radiculopathy  M54.12   2. Chronic bilateral low back pain, unspecified whether sciatica present  M54.5    G89.29   3. Lumbar radiculopathy  M54.16   4. Left leg numbness  R20.0   5. Left leg weakness  R29.898   6. Hyperreflexia  R29.2   7. Tremor  R25.1           ASSESSMENT AND PLAN:  Santo Hernandez 26 y.o. male      Santo was seen today for new patient.    Diagnoses and all orders for this visit:    Cervical radiculopathy  -     REFERRAL TO PHYSICAL MEDICINE REHAB    Chronic bilateral low back pain, unspecified whether sciatica present    Lumbar radiculopathy  -     MR-LUMBAR SPINE-W/O; Future  -     DX-LUMBAR SPINE-2 OR 3 VIEWS; Future    Left leg numbness  -     MR-LUMBAR SPINE-W/O; Future  -     DX-LUMBAR SPINE-2 OR 3 VIEWS; Future    Left leg weakness  -     MR-LUMBAR SPINE-W/O; Future  -     DX-LUMBAR SPINE-2 OR 3 VIEWS; Future    Hyperreflexia  -     MR-LUMBAR SPINE-W/O; Future  -      DX-LUMBAR SPINE-2 OR 3 VIEWS; Future    Tremor      The patient has a cervical radiculopathy which is not improved with conservative treatment including physical therapy which exacerbated his symptoms and he is done a home exercise program including the last 3 months.  He is also tried medication management continues have significant pain and functional deficits.  He also has numbness and tingling and weakness in the left upper extremity.  This is likely secondary to his C7 radiculopathy.    Unexplained there is hyperreflexia.  Also his low back symptoms are likely secondary to a left lower lumbar radiculopathy which has worsened despite physical therapy.    Diagnostic workup: As above    Medications:   I recommend avoiding chronic opioid therapy in this patient.  Gabapentin 100-300 mg nightly as needed could be trialed however I would defer to primary care given the patient's complicated psychiatric history.    Interventional program:   I have ordered a C7-T1 interlaminar epidural steroid injection    The risks benefits and alternatives to this procedure were discussed and the patient wishes to proceed with the procedure. Risks include but are not limited to damage to surrounding structures, infection, bleeding, worsening of pain which can be permanent, weakness which can be permanent. Benefits include pain relief, improved function. Alternatives includes not doing the procedure.        Referrals: I recommended the patient follow-up with neurology after the work-up and injection of been completed as his tremors are still unexplained.      Follow-up: After the above diagnostic studies           Please note that this dictation was created using voice recognition software. I have made every reasonable attempt to correct obvious errors but there may be errors of grammar and content that I may have overlooked prior to finalization of this note.      Schuyler Hussein MD  Physical Medicine and  Rehabilitation  Interventional Spine and Sports Physiatry  [facility]           Clayton Wright M.D

## 2021-08-12 NOTE — PATIENT INSTRUCTIONS
Your procedure will be at the [facility] special procedure suite.    [facility] [place] [address]       PRE-PROCEDURE INSTRUCTIONS  You may take your regular medications except:   · No Anti-inflammatories 5 days prior to your procedure. Anti-inflammatories include medicines such as  ibuprofen (Motrin, Advil), Excedrin, Naproxen (Aleve, Anaprox, Naprelan, Naprosyn), Celecoxib (Celebrex), Diclofenac (Voltaren-XR tab), and Meloxicam (Mobic).   · You can take the remainder of your pain medications as prescribed.   · If you are having a diagnostic procedure such as a medial branch block, do not use her pain meds on the day of the procedure  · No Glucophage or Metformin 24 hours before your procedure. You may resume next day after your procedure.  · Call the physiatry office if you are taking or prescribed anti-biotics within five days of procedure.  · Please ask provider if you are taking any new diabetes medication.  · CONTINUE TAKING BLOOD PRESSURE MEDICATIONS AS PRESCRIBED.  · Pain medications will not be prescribed on the procedure day. Procedural pain medication may be used by your provider   · Call your doctor's office performing the procedure if you have a fever, chills, rash or new illness prior to your procedure    Anticoagulation/antiplatelet medications  No Blood thinning medications such as Coumadin, Xarelto, aspirin or Plavix 5 days prior to procedure unless your doctor said to continue these medications. Call your doctor if a new medication is prescribed in this class.     Restrictions for eating before procedure:   · If you are getting procedural sedation, then do not eat to for 8 hours prior to procedure appointment time. Do not drink fluids for four hours prior to your procedure time.   · If you are not having procedural sedation, then Skip the meal prior to your procedure. If you have a morning procedure then skip breakfast. If you have an afternoon procedure then skip lunch.   · You  may drink clear liquids up to 2 hours prior to your procedure  · You must have a  the day of procedure to accompany you home.      POST PROCEDURE INSTRUCTIONS   · No heavy lifting, strenuous bending or strenuous exercise for 3 days after your procedure.  · No hot tubs, baths, swimming for 3 days after your procedure  · You can remove the bandage the day after the procedure.  · IF YOU RECEIVED A STEROID INJECTION. PLEASE NOTE THAT THERE MAY BE A DELAY FOR THE INJECTION TO START WORKING, THE DELAY MAY BE UP TO TWO WEEKS. IF YOU HAVE DIABETES, PLEASE NOTE THAT YOUR SUGAR LEVELS MAY BE ELEVATED FOR 1-2 DAYS AFTER A STEROID INJECTION.  THE STEROID MAY CAUSE TEMPORARY SYMPTOMS WHICH USUALLY RESOLVE ON THEIR OWN WITHIN 1 TO 2 DAYS INCLUDING FACIAL FLUSHING OR A FEELING OF WARMTH ON THE FACE, TEMPORARY INCREASES IN BLOOD SUGAR, INSOMNIA, INCREASED HUNGER  · IF YOU EXPERIENCE PROLONGED WEAKNESS LONGER THAN ONE DAY, BOWEL OR BLADDER INCONTINENCE THEN PLEASE CALL THE PHYSIATRY OFFICE.  · Your leg may feel heavy, weak and numb for up to 1-2 days. Be very careful walking.    You may resume normal activities 3 days after procedure.

## 2021-08-12 NOTE — Clinical Note
Dear Dr Blackwood,     I do not believe we have had the chance to meet.  I would like to chat with you sometime if that would be okay to see which cases will be good to refer to you.  I commonly treat radiculopathy patients. If these do pop up, please consider referring to me and we have multiple options for these patients. For Mr Hernandez, I have him scheduled for a cervical epidural steroid injection.  Schuyler Hussein MD

## 2021-08-12 NOTE — PROGRESS NOTES
New patient note    Physiatry (physical medicine and  Rehabilitation), interventional spine and sports medicine    Date of service: See epic    Chief complaint:   Chief Complaint   Patient presents with   • New Patient     Neck pain        Referring provider: Clayton Ferrell M.D.     HISTORY    HPI: Santo Hernandez 26 y.o.  who presents today with Diagnoses of Cervical radiculopathy, Chronic bilateral low back pain, unspecified whether sciatica present, Lumbar radiculopathy, Left leg numbness, Left leg weakness, Hyperreflexia, and Tremor were pertinent to this visit.    HPI    Chronic bilateral neck pain radiating to the left arm with left arm weakness and numbness.  The patient is also had a tremor of multiple extremities.  He is followed by neurology and his PCP and has been to physical therapy.  Physical therapy had exacerbated his symptoms so this was stopped for now until he gets further treatments and diagnoses. Moderate intensity. Constant.     He also has a separate issue with low back pain radiating down the left leg with associated numbness and tingling and weakness.  This is been chronic.  This is not improved with physical therapy and has gotten worse. Moderate intensity. Constant.        Psychological testing for pain as depression and pain commonly coexist and need to both be treated.     Opioid Risk Score: 7      Interpretation of Opioid Risk Score   Score 0-3 = Low risk of abuse. Do UDS at least once per year.  Score 4-7 = Moderate risk of abuse. Do UDS 1-4 times per year.  Score 8+ = High risk of abuse. Refer to specialist.    PHQ  Depression Screen (PHQ-2/PHQ-9) 1/24/2019 3/2/2021 8/12/2021   PHQ-2 Total Score 0 0 1   PHQ-9 Total Score - - 6       Interpretation of PHQ-9 Total Score   Score Severity   1-4 No Depression   5-9 Mild Depression   10-14 Moderate Depression   15-19 Moderately Severe Depression   20-27 Severe Depression     Medical records review:  I reviewed the note from the  referring provider Clayton Ferrell M.D. including the note dated 7/13/2021 where the patient was seen for concern for cervical radiculopathy.  I also reviewed previous notes for physical therapy from Shireen Robles, PT, DPT.  Include most recent note from 6/30/2021.  Traction was done.  Therapeutic exercise was done.  Lateral progression with physical therapy and was referred back to physicians..       I reviewed the notes from Dr. Blackwood of neurology including most recent note from 5/20/2021.  At that time the patient was diagnosed with cervical radiculopathy involving C7.  Physical therapy was ordered and the patient was to follow-up as needed.    ROS:   Red Flags ROS:   Fever, Chills, Sweats: Denies  Involuntary Weight Loss: Denies  Bladder Incontinence: Denies  Bowel Incontinence: denies  Saddle Anesthesia: Denies    All other systems reviewed and negative.       PMHx:   Past Medical History:   Diagnosis Date   • Asthma    • B12 deficiency 10/12/2018   • Eczema    • GERD (gastroesophageal reflux disease)    • BOB (obstructive sleep apnea) 10/12/2018         Current Outpatient Medications on File Prior to Visit   Medication Sig Dispense Refill   • cetirizine (ZYRTEC) 10 MG Tab Take 10 mg by mouth every day.     • fluticasone (FLONASE) 50 MCG/ACT nasal spray Administer 1 Spray into affected nostril(S) every day.     • albuterol 108 (90 Base) MCG/ACT Aero Soln inhalation aerosol Inhale 2 Puffs every 6 hours as needed for Shortness of Breath. 8.5 g 3   • Guanfacine HCl 2 MG Tab Take 2 mg by mouth at bedtime.     • fluocinonide (LIDEX) 0.05 % Ointment Apply  to affected area(s) 2 times a day.     • triamcinolone acetonide (KENALOG) 0.1 % Cream Apply a thin layer to rash BID PRN. Do not use longer than 7 days straight. 60 g 1   • LamoTRIgine 250 MG TABLET SR 24 HR Once a day (Patient not taking: Reported on 8/12/2021)       No current facility-administered medications on file prior to visit.        PSHx:   History  reviewed. No pertinent surgical history.    Family history   History reviewed. No pertinent family history.      Medications: reviewed on epic.   Outpatient Medications Marked as Taking for the 8/12/21 encounter (Office Visit) with Schuyler Hussein M.D.   Medication Sig Dispense Refill   • cetirizine (ZYRTEC) 10 MG Tab Take 10 mg by mouth every day.     • fluticasone (FLONASE) 50 MCG/ACT nasal spray Administer 1 Spray into affected nostril(S) every day.     • albuterol 108 (90 Base) MCG/ACT Aero Soln inhalation aerosol Inhale 2 Puffs every 6 hours as needed for Shortness of Breath. 8.5 g 3   • Guanfacine HCl 2 MG Tab Take 2 mg by mouth at bedtime.     • fluocinonide (LIDEX) 0.05 % Ointment Apply  to affected area(s) 2 times a day.          Allergies:   Allergies   Allergen Reactions   • Ibuprofen Swelling       Social Hx:   Social History     Socioeconomic History   • Marital status: Single     Spouse name: Not on file   • Number of children: Not on file   • Years of education: Not on file   • Highest education level: Not on file   Occupational History   • Not on file   Tobacco Use   • Smoking status: Former Smoker   • Smokeless tobacco: Never Used   Vaping Use   • Vaping Use: Never used   Substance and Sexual Activity   • Alcohol use: Not Currently   • Drug use: Yes     Types: Marijuana, Inhaled     Comment: THC   • Sexual activity: Not on file   Other Topics Concern   •  Service No   • Blood Transfusions No   • Caffeine Concern No   • Occupational Exposure No   • Hobby Hazards No   • Sleep Concern No   • Stress Concern No   • Weight Concern No   • Special Diet No   • Back Care No   • Exercise Yes   • Bike Helmet Yes   • Seat Belt Yes   • Self-Exams No   Social History Narrative   • Not on file     Social Determinants of Health     Financial Resource Strain:    • Difficulty of Paying Living Expenses:    Food Insecurity:    • Worried About Running Out of Food in the Last Year:    • Ran Out of Food in the  "Last Year:    Transportation Needs:    • Lack of Transportation (Medical):    • Lack of Transportation (Non-Medical):    Physical Activity:    • Days of Exercise per Week:    • Minutes of Exercise per Session:    Stress:    • Feeling of Stress :    Social Connections:    • Frequency of Communication with Friends and Family:    • Frequency of Social Gatherings with Friends and Family:    • Attends Christianity Services:    • Active Member of Clubs or Organizations:    • Attends Club or Organization Meetings:    • Marital Status:    Intimate Partner Violence:    • Fear of Current or Ex-Partner:    • Emotionally Abused:    • Physically Abused:    • Sexually Abused:          EXAMINATION     Physical Exam:   Vitals: /68 (BP Location: Right arm, Patient Position: Sitting, BP Cuff Size: Small adult)   Pulse 87   Temp 36.9 °C (98.5 °F)   Ht 1.803 m (5' 11\")   Wt 76.7 kg (169 lb 1.5 oz)   SpO2 97%     Constitutional:   Body Habitus: Body mass index is 23.58 kg/m².  Cooperation: Fully cooperates with exam  Appearance: Well-groomed, well-nourished, not disheveled     Eyes: No scleral icterus to suggest severe liver disease, no proptosis to suggest severe hyperthyroid    ENT -no obvious auditory deficits, no obvious tongue lesions, tongue midline, no facial droop     Skin -no rashes or lesions noted     Respiratory-  breathing comfortable on room air, no audible wheezing    Cardiovascular- capillary refills less than 2 seconds. No lower extremity edema is noted.     Psychiatric- alert and oriented ×3. Normal affect.     Gait - normal gait, no use of ambulatory device, nonantalgic.     Musculoskeletal and Neuro -       Cervical spine   Inspection: No deformities of the skin over the cervical spine. No rashes or lesions.    decreased  active range of motion in all directions, with  pain      Spurling’s sign: negative right, positive left    No signs of muscular atrophy in bilateral upper extremities     No tenderness to " palpation of the cervical spine      Key points for the international standards for neurological classification of spinal cord injury (ISNCSCI) to light touch.     Dermatome R L   C4 2 2   C5 2 2   C6 2 2   C7 2 1   C8 2 1   T1 2 2   T2 2 2                                     Motor Exam Upper Extremities   ? Myotome R L   Shoulder flexion C5 5 5   Elbow flexion C5 5 5   Wrist extension C6 5 5   Elbow extension C7 5 4   Finger flexion C8 5 5   Finger abduction T1 5 5     Reflexes  ?  R L   Biceps  2+ 3+   Brachioradialis  2+ 3+     Guido’s sign negative right, positive left            Thoracic/Lumbar Spine/Sacral Spine/Hips   Inspection: No evidence of atrophy in bilateral lower extremities throughout     ROM: decreased active range of motion with flexion, lateral flexion, and rotation bilaterally.   There is decreased active range of motion with lumbar extension with pain.      Palpation:   No tenderness to palpation in midline at T1-T12 levels. No tenderness to palpation in the left and right of the midline T1-L5, NEGATIVE for tenderness to palpation to the para-midline region in the lower lumbar levels.  palpation over SI joint: negative bilaterally    palpation in hip or over the gluteus medius tendon insertion: negative bilaterally      Lumbar spine Special tests  Neuro tension  Straight leg test negative right, positive left    Slump test negative right, positive left      HIP  FAIR test negative bilaterally    Range of motion in the RIGHT hip is full  in flexion, extension, abduction, internal rotation, external rotation.  Range of motion in the LEFT hip is full  in flexion, extension, abduction, internal rotation, external rotation.      SI joint tests  Observation patient sits on one buttocks: Negative  SI joint compression negative bilaterally    SI joint distraction negative bilaterally    Thigh thrust test negative bilaterally    ASHLIE test negative bilaterally                 Key points for the  international standards for neurological classification of spinal cord injury (ISNCSCI) to light touch.     Dermatome R L                                      L2 2 2   L3 2 2   L4 2 1   L5 2 1   S1 2 2   S2 2 2       Motor Exam Lower Extremities    ? Myotome R L   Hip flexion L2 5 5   Knee extension L3 5 5   Ankle dorsiflexion L4 5 5   Toe extension L5 5 4   Ankle plantarflexion S1 5 5         Reflexes  ?  R L                Patella  3+ 3+   Achilles   3+ 3+       Babinski sign negative bilaterally   Clonus of the ankle positive bilaterally       MEDICAL DECISION MAKING    Medical records review: see under HPI section.     DATA    Labs:   Lab Results   Component Value Date/Time    SODIUM 138 04/25/2021 10:27 AM    POTASSIUM 3.8 04/25/2021 10:27 AM    CHLORIDE 105 04/25/2021 10:27 AM    CO2 27 04/25/2021 10:27 AM    ANION 6.0 (L) 04/25/2021 10:27 AM    GLUCOSE 112 (H) 04/25/2021 10:27 AM    BUN 14 04/25/2021 10:27 AM    CREATININE 0.91 04/25/2021 10:27 AM    CALCIUM 8.4 (L) 04/25/2021 10:27 AM    ASTSGOT 23 04/25/2021 10:27 AM    ALTSGPT 23 04/25/2021 10:27 AM    TBILIRUBIN 0.2 04/25/2021 10:27 AM    ALBUMIN 3.5 04/25/2021 10:27 AM    TOTPROTEIN 5.7 (L) 04/25/2021 10:27 AM    GLOBULIN 2.2 04/25/2021 10:27 AM    AGRATIO 1.6 04/25/2021 10:27 AM   ]    No results found for: PROTHROMBTM, INR     Lab Results   Component Value Date/Time    WBC 10.1 04/25/2021 10:27 AM    RBC 4.65 (L) 04/25/2021 10:27 AM    HEMOGLOBIN 14.3 04/25/2021 10:27 AM    HEMATOCRIT 44.0 04/25/2021 10:27 AM    MCV 94.6 04/25/2021 10:27 AM    MCH 30.8 04/25/2021 10:27 AM    MCHC 32.5 (L) 04/25/2021 10:27 AM    MPV 10.1 04/25/2021 10:27 AM    NEUTSPOLYS 49.60 04/25/2021 10:27 AM    LYMPHOCYTES 31.30 04/25/2021 10:27 AM    MONOCYTES 6.60 04/25/2021 10:27 AM    EOSINOPHILS 11.50 (H) 04/25/2021 10:27 AM    BASOPHILS 0.60 04/25/2021 10:27 AM        No results found for: HBA1C     Imaging:   I personally reviewed following images, these are my reads  MRI  cervical spine 3/12/2021 there is a left paracentral disc herniation at C6-7 consistent with a left C7 radiculopathy.        IMAGING radiology reads. I reviewed the following radiology reads   CT cervical spine 4/25/2021  IMPRESSION:     No fracture or subluxation is seen in the cexrvical spine.        Results for orders placed during the hospital encounter of 02/20/21    MR-BRAIN-W/O    Impression  MRI of the brain without contrast within normal limits.             Results for orders placed during the hospital encounter of 03/12/21    MR-CERVICAL SPINE-W/O    Impression  1.  C3-4 negligible right paramedian disc bulge. No central or foraminal stenosis.  2.  C6-C7 moderate-sized left paramedian disc protrusion with moderate sized component of left-sided caudad disc extrusion extending down to at least the mid C7 vertebral body level. Marked compromise of the left lateral recess.                                                                                               Diagnosis   Visit Diagnoses     ICD-10-CM   1. Cervical radiculopathy  M54.12   2. Chronic bilateral low back pain, unspecified whether sciatica present  M54.5    G89.29   3. Lumbar radiculopathy  M54.16   4. Left leg numbness  R20.0   5. Left leg weakness  R29.898   6. Hyperreflexia  R29.2   7. Tremor  R25.1           ASSESSMENT AND PLAN:  Santo Hernandez 26 y.o. male      Santo was seen today for new patient.    Diagnoses and all orders for this visit:    Cervical radiculopathy  -     REFERRAL TO PHYSICAL MEDICINE REHAB    Chronic bilateral low back pain, unspecified whether sciatica present    Lumbar radiculopathy  -     MR-LUMBAR SPINE-W/O; Future  -     DX-LUMBAR SPINE-2 OR 3 VIEWS; Future    Left leg numbness  -     MR-LUMBAR SPINE-W/O; Future  -     DX-LUMBAR SPINE-2 OR 3 VIEWS; Future    Left leg weakness  -     MR-LUMBAR SPINE-W/O; Future  -     DX-LUMBAR SPINE-2 OR 3 VIEWS; Future    Hyperreflexia  -     MR-LUMBAR SPINE-W/O; Future  -      DX-LUMBAR SPINE-2 OR 3 VIEWS; Future    Tremor      The patient has a cervical radiculopathy which is not improved with conservative treatment including physical therapy which exacerbated his symptoms and he is done a home exercise program including the last 3 months.  He is also tried medication management continues have significant pain and functional deficits.  He also has numbness and tingling and weakness in the left upper extremity.  This is likely secondary to his C7 radiculopathy.    Unexplained there is hyperreflexia.  Also his low back symptoms are likely secondary to a left lower lumbar radiculopathy which has worsened despite physical therapy.    Diagnostic workup: As above    Medications:   I recommend avoiding chronic opioid therapy in this patient.  Gabapentin 100-300 mg nightly as needed could be trialed however I would defer to primary care given the patient's complicated psychiatric history.    Interventional program:   I have ordered a C7-T1 interlaminar epidural steroid injection    The risks benefits and alternatives to this procedure were discussed and the patient wishes to proceed with the procedure. Risks include but are not limited to damage to surrounding structures, infection, bleeding, worsening of pain which can be permanent, weakness which can be permanent. Benefits include pain relief, improved function. Alternatives includes not doing the procedure.        Referrals: I recommended the patient follow-up with neurology after the work-up and injection of been completed as his tremors are still unexplained.      Follow-up: After the above diagnostic studies           Please note that this dictation was created using voice recognition software. I have made every reasonable attempt to correct obvious errors but there may be errors of grammar and content that I may have overlooked prior to finalization of this note.      Schuyler Hussein MD  Physical Medicine and  Rehabilitation  Interventional Spine and Sports Physiatry  [facility]           Clayton Wright M.D

## 2021-08-14 ENCOUNTER — HOSPITAL ENCOUNTER (OUTPATIENT)
Dept: RADIOLOGY | Facility: MEDICAL CENTER | Age: 26
End: 2021-08-14
Attending: PHYSICAL MEDICINE & REHABILITATION
Payer: COMMERCIAL

## 2021-08-14 DIAGNOSIS — R20.0 LEFT LEG NUMBNESS: ICD-10-CM

## 2021-08-14 DIAGNOSIS — R29.2 HYPERREFLEXIA: ICD-10-CM

## 2021-08-14 DIAGNOSIS — M54.16 LUMBAR RADICULOPATHY: ICD-10-CM

## 2021-08-14 DIAGNOSIS — R29.898 LEFT LEG WEAKNESS: ICD-10-CM

## 2021-08-14 PROCEDURE — 72148 MRI LUMBAR SPINE W/O DYE: CPT

## 2021-08-16 ENCOUNTER — APPOINTMENT (OUTPATIENT)
Dept: RADIOLOGY | Facility: REHABILITATION | Age: 26
End: 2021-08-16
Attending: PHYSICAL MEDICINE & REHABILITATION
Payer: COMMERCIAL

## 2021-08-16 ENCOUNTER — HOSPITAL ENCOUNTER (OUTPATIENT)
Facility: REHABILITATION | Age: 26
End: 2021-08-16
Attending: PHYSICAL MEDICINE & REHABILITATION | Admitting: PHYSICAL MEDICINE & REHABILITATION
Payer: COMMERCIAL

## 2021-08-16 VITALS
DIASTOLIC BLOOD PRESSURE: 76 MMHG | WEIGHT: 167.77 LBS | HEART RATE: 62 BPM | HEIGHT: 71 IN | SYSTOLIC BLOOD PRESSURE: 119 MMHG | BODY MASS INDEX: 23.49 KG/M2 | RESPIRATION RATE: 16 BRPM | OXYGEN SATURATION: 96 % | TEMPERATURE: 98.5 F

## 2021-08-16 PROCEDURE — 62321 NJX INTERLAMINAR CRV/THRC: CPT

## 2021-08-16 PROCEDURE — 700117 HCHG RX CONTRAST REV CODE 255

## 2021-08-16 PROCEDURE — 700111 HCHG RX REV CODE 636 W/ 250 OVERRIDE (IP)

## 2021-08-16 RX ORDER — GUANFACINE 2 MG/1
2 TABLET, EXTENDED RELEASE ORAL
COMMUNITY
Start: 2021-07-23 | End: 2022-02-09

## 2021-08-16 RX ORDER — LIDOCAINE HYDROCHLORIDE 10 MG/ML
INJECTION, SOLUTION EPIDURAL; INFILTRATION; INTRACAUDAL; PERINEURAL
Status: COMPLETED
Start: 2021-08-16 | End: 2021-08-16

## 2021-08-16 RX ORDER — DEXAMETHASONE SODIUM PHOSPHATE 10 MG/ML
INJECTION, SOLUTION INTRAMUSCULAR; INTRAVENOUS
Status: COMPLETED
Start: 2021-08-16 | End: 2021-08-16

## 2021-08-16 RX ORDER — LAMOTRIGINE 100 MG/1
100 TABLET, EXTENDED RELEASE ORAL DAILY
COMMUNITY
Start: 2021-08-11

## 2021-08-16 RX ADMIN — LIDOCAINE HYDROCHLORIDE 10 ML: 10 INJECTION, SOLUTION EPIDURAL; INFILTRATION; INTRACAUDAL; PERINEURAL at 08:08

## 2021-08-16 RX ADMIN — IOHEXOL 2 ML: 240 INJECTION, SOLUTION INTRATHECAL; INTRAVASCULAR; INTRAVENOUS; ORAL at 08:09

## 2021-08-16 RX ADMIN — DEXAMETHASONE SODIUM PHOSPHATE 10 MG: 10 INJECTION, SOLUTION INTRAMUSCULAR; INTRAVENOUS at 08:10

## 2021-08-16 ASSESSMENT — PAIN DESCRIPTION - PAIN TYPE: TYPE: CHRONIC PAIN

## 2021-08-16 ASSESSMENT — FIBROSIS 4 INDEX: FIB4 SCORE: 0.5

## 2021-08-16 NOTE — PROGRESS NOTES
Report received from ASIA Jo. VSS. Pt drinking juice and eating crackers w/o n/v. Dressing to upper back is CDI. Pt assessed by MD and d/c'd per order. Ambulated to vehicle by ASIA Coffey.

## 2021-08-16 NOTE — PROGRESS NOTES
Pt given verbal and written d/c instructions including verbal infection prevention information and s/s of post procedure infection and verbalizes understanding. denies nsaid use or blood thinners use in last 5 days, denies current infection or abx use. Hx BOB, GERD, asthma. Med rec complete. Pt has post procedural ride home with his dad Eagle

## 2021-08-16 NOTE — OP REPORT
Date of Service: 8/16/2021    Physician/s: Schuyler Hussein MD    Pre-operative Diagnosis: Cervical radiculopathy    Post-operative Diagnosis: Cervical radiculopathy    Procedure: C7-T1  Cervical interlaminar epidural steroid injection    Description of procedure:    The risks, benefits, and alternatives of the procedure were reviewed and discussed with the patient.  Written informed consent was freely obtained. A pre-procedural time-out was conducted by the physician verifying patient’s identity, procedure to be performed, procedure site and side, and allergy verification. Appropriate equipment was determined to be in place for the procedure.         The patient's vital signs were carefully monitored before, throughout, and after the procedure.     In the fluoroscopy suite the patient was placed in a prone position, a pillow placed underneath their chest. The skin was prepped and draped in the usual sterile fashion. The fluoroscope was placed over the cervical neck at the appropriate injection AP angle view, and the target for injection was marked. A 25g needle was placed into the marked site, and approx 2cc of 1% Lidocaine was injected subcutaneously into the epidermal and dermal layers. The needle was removed. A 20g Tuohy needle was then placed and advanced under fluoroscopic guidance into the RIGHT C7-T1 interlaminar space at both the initial position AP view and contralateral oblique at a lateral view to ensure proper location of the needle tip at all times.  The needle was advanced with fluoroscopic guidance to the superior aspect of the T1 lamina.  Then a contralateral oblique view was used to advance the needle slightly towards the epidural space, A loss-of-resistance technique was used to guide the needle into the epidural space in a lateral fluoroscopic view and confirmed with loss of resistance with sterile normal saline. In the AP and lateral views, contrast dye was used to highlight the epidural space  spread while the fluoroscope was running live. Following negative aspiration, 1mL of 10mg/mL of dexamethasone followed by 2 mL of sterile saline . The needle was removed intact after restyleted. The patient's back was covered with a 4x4 gauze, the area was cleansed with sterile normal saline, and a dressing was applied. There were no complications noted.     The patient was then evaluated post-procedure, and was hemodynamically stable prior to leaving the post-operative care unit.     Follow-up as scheduled    Schuyler Hussein MD  Physical Medicine and Rehabilitation  Interventional Spine and Sports Physiatry  [facility]        CPT  interlaminar cervical/thoracic epidural: [id number]

## 2021-08-16 NOTE — PROGRESS NOTES
0818 Pt to recovery area s/p epidural & report received from procedure RN. VSS. Pt reports relief in pain at this time. Ice pack applied to affected area. Dressing CDI, no swelling noted. Pt tolerating PO fluids & snacks with no C/O N/V.     9510 Handoff given to ASIA Coffey.

## 2021-08-16 NOTE — PROGRESS NOTES
0800.     With the help by team (  X- ray Tech, CST,RN ) patient  prone positioned  onto procedure  table.  Monitoring equipment  ( BP, pulse oximeter oxygen  )  connected. Forehead on lower headrest of the bed, lower extremities supported with pillow.     0807 AM.     Patient identified. Site and procedure confirmed and nicci by Dr. Hussein .Medication allergies. Reviewed pertinent medical history ( BOB on CPAP,  Asthma, GERD ).Timeout completed, team and patient agreed.

## 2021-08-16 NOTE — INTERVAL H&P NOTE
H&P reviewed. The patient was examined and there are no changes to the H&P     Lungs clear to auscultation bilaterally.  No abdominal tenderness.  Heart regular rate and rhythm.  Vitals reviewed.    Proceed with the originally scheduled procedure.  The risks, benefits and alternatives were discussed.  The patient understands these.    Pre-Op Diagnosis Codes:     * Cervical radiculopathy [M54.12]  Procedure(s):  C7-T1 interlaminar epidural steroid injection    Schuyler Hussein MD  Physical Medicine and Rehabilitation  Interventional Spine and Sports Physiatry  [facility]

## 2021-09-14 ENCOUNTER — OFFICE VISIT (OUTPATIENT)
Dept: PHYSICAL MEDICINE AND REHAB | Facility: MEDICAL CENTER | Age: 26
End: 2021-09-14
Payer: COMMERCIAL

## 2021-09-14 VITALS
TEMPERATURE: 98.7 F | HEIGHT: 71 IN | OXYGEN SATURATION: 94 % | BODY MASS INDEX: 23.58 KG/M2 | HEART RATE: 99 BPM | SYSTOLIC BLOOD PRESSURE: 120 MMHG | DIASTOLIC BLOOD PRESSURE: 74 MMHG | RESPIRATION RATE: 20 BRPM | WEIGHT: 168.43 LBS

## 2021-09-14 DIAGNOSIS — M54.12 CERVICAL RADICULOPATHY: ICD-10-CM

## 2021-09-14 DIAGNOSIS — G89.29 CHRONIC BILATERAL LOW BACK PAIN, UNSPECIFIED WHETHER SCIATICA PRESENT: ICD-10-CM

## 2021-09-14 DIAGNOSIS — M54.50 CHRONIC BILATERAL LOW BACK PAIN, UNSPECIFIED WHETHER SCIATICA PRESENT: ICD-10-CM

## 2021-09-14 DIAGNOSIS — M54.16 LUMBAR RADICULOPATHY: ICD-10-CM

## 2021-09-14 PROCEDURE — 99213 OFFICE O/P EST LOW 20 MIN: CPT | Performed by: PHYSICAL MEDICINE & REHABILITATION

## 2021-09-14 ASSESSMENT — FIBROSIS 4 INDEX: FIB4 SCORE: 0.5

## 2021-09-14 ASSESSMENT — PATIENT HEALTH QUESTIONNAIRE - PHQ9
SUM OF ALL RESPONSES TO PHQ QUESTIONS 1-9: 8
CLINICAL INTERPRETATION OF PHQ2 SCORE: 2
5. POOR APPETITE OR OVEREATING: 1 - SEVERAL DAYS

## 2021-09-14 ASSESSMENT — PAIN SCALES - GENERAL: PAINLEVEL: 1=MINIMAL PAIN

## 2021-09-14 NOTE — PROGRESS NOTES
Follow up patient note  Interventional spine and sports physiatry, Physical medicine rehabilitation      Chief complaint:   Chief Complaint   Patient presents with   • Follow-Up     Post SP C7-T1          HISTORY    Please see new patient note by Dr Hussein,  for more details.     HPI  Patient identification: Santo Hernandez ,  1995,   With Diagnoses of Cervical radiculopathy, Chronic bilateral low back pain, unspecified whether sciatica present, and Lumbar radiculopathy were pertinent to this visit.     Procedures:  2021 C7-T1 interlaminar epidural steroid injection preprocedure pain 7/10.  Postprocedure pain /10    The patient is very happy with the pain relief after the epidural steroid injection.  He gets intermittent pain but this not well controlled he reports 1 out of 10 intensity.  He does get some stiffness.  Denies radiating pain down the arms.  Denies numbness tingling weakness.         ROS Red Flags :   Fever, Chills, Sweats: Denies  Involuntary Weight Loss: Denies  Bowel/Bladder Incontinence: Denies  Saddle Anesthesia: Denies        PMHx:   Past Medical History:   Diagnosis Date   • Asthma    • B12 deficiency 10/12/2018   • Eczema    • GERD (gastroesophageal reflux disease)    • BOB (obstructive sleep apnea) 10/12/2018       PSHx:   Past Surgical History:   Procedure Laterality Date   • [place] INJ CERV/THORAC,W/ IMAGING  2021    Procedure: C7-T1 interlaminar epidural steroid injection;  Surgeon: Schuyler Hussein M.D.;  [facility];  Service: Pain Management       Family history   History reviewed. No pertinent family history.      Medications:   Outpatient Medications Marked as Taking for the 21 encounter (Office Visit) with Schuyler Hussein M.D.   Medication Sig Dispense Refill   • LamoTRIgine 100 MG TABLET SR 24 HR Take 100 mg by mouth every day.     • GuanFACINE HCl 2 MG TABLET SR 24 HR Take 2 mg by mouth at bedtime.     • triamcinolone acetonide  (KENALOG) 0.1 % Cream Apply a thin layer to rash BID PRN. Do not use longer than 7 days straight. 60 g 1   • cetirizine (ZYRTEC) 10 MG Tab Take 10 mg by mouth every day.     • fluticasone (FLONASE) 50 MCG/ACT nasal spray Administer 1 Spray into affected nostril(S) every day.     • albuterol 108 (90 Base) MCG/ACT Aero Soln inhalation aerosol Inhale 2 Puffs every 6 hours as needed for Shortness of Breath. 8.5 g 3   • fluocinonide (LIDEX) 0.05 % Ointment Apply  to affected area(s) 2 times a day.          Current Outpatient Medications on File Prior to Visit   Medication Sig Dispense Refill   • LamoTRIgine 100 MG TABLET SR 24 HR Take 100 mg by mouth every day.     • GuanFACINE HCl 2 MG TABLET SR 24 HR Take 2 mg by mouth at bedtime.     • triamcinolone acetonide (KENALOG) 0.1 % Cream Apply a thin layer to rash BID PRN. Do not use longer than 7 days straight. 60 g 1   • cetirizine (ZYRTEC) 10 MG Tab Take 10 mg by mouth every day.     • fluticasone (FLONASE) 50 MCG/ACT nasal spray Administer 1 Spray into affected nostril(S) every day.     • albuterol 108 (90 Base) MCG/ACT Aero Soln inhalation aerosol Inhale 2 Puffs every 6 hours as needed for Shortness of Breath. 8.5 g 3   • fluocinonide (LIDEX) 0.05 % Ointment Apply  to affected area(s) 2 times a day.     • LamoTRIgine 250 MG TABLET SR 24 HR Once a day (Patient not taking: Reported on 8/12/2021)       No current facility-administered medications on file prior to visit.         Allergies:   Allergies   Allergen Reactions   • Ibuprofen Swelling       Social Hx:   Social History     Socioeconomic History   • Marital status: Single     Spouse name: Not on file   • Number of children: Not on file   • Years of education: Not on file   • Highest education level: Not on file   Occupational History   • Not on file   Tobacco Use   • Smoking status: Former Smoker   • Smokeless tobacco: Never Used   Vaping Use   • Vaping Use: Never used   Substance and Sexual Activity   • Alcohol  "use: Not Currently   • Drug use: Yes     Types: Marijuana, Inhaled     Comment: THC   • Sexual activity: Not on file   Other Topics Concern   •  Service No   • Blood Transfusions No   • Caffeine Concern No   • Occupational Exposure No   • Hobby Hazards No   • Sleep Concern No   • Stress Concern No   • Weight Concern No   • Special Diet No   • Back Care No   • Exercise Yes   • Bike Helmet Yes   • Seat Belt Yes   • Self-Exams No   Social History Narrative   • Not on file     Social Determinants of Health     Financial Resource Strain:    • Difficulty of Paying Living Expenses:    Food Insecurity:    • Worried About Running Out of Food in the Last Year:    • Ran Out of Food in the Last Year:    Transportation Needs:    • Lack of Transportation (Medical):    • Lack of Transportation (Non-Medical):    Physical Activity:    • Days of Exercise per Week:    • Minutes of Exercise per Session:    Stress:    • Feeling of Stress :    Social Connections:    • Frequency of Communication with Friends and Family:    • Frequency of Social Gatherings with Friends and Family:    • Attends [employer] Services:    • Active Member of Clubs or Organizations:    • Attends Club or Organization Meetings:    • Marital Status:    Intimate Partner Violence:    • Fear of Current or Ex-Partner:    • Emotionally Abused:    • Physically Abused:    • Sexually Abused:          EXAMINATION     Physical Exam:   Vitals: /74 (BP Location: Left arm, Patient Position: Sitting, BP Cuff Size: Adult)   Pulse 99   Temp 37.1 °C (98.7 °F) (Temporal)   Resp 20   Ht 1.803 m (5' 10.98\")   Wt 76.4 kg (168 lb 6.9 oz)   SpO2 94%     Constitutional:   Body Habitus: Body mass index is 23.5 kg/m².  Cooperation: Fully cooperates with exam  Appearance: Well-groomed no disheveled    Respiratory-  breathing comfortable on room air, no audible wheezing  Cardiovascular- capillary refills less than 2 seconds. No lower extremity edema is noted.   Psychiatric- " alert and oriented ×3. Normal affect.    MSK and Neuro: -  Cervical spine  There are no signs of infection around the injection sites.     full  active range of motion with flexion, lateral flexion, and rotation bilaterally.   There is full  active range of motion with cervical extension.      Palpation:   Tenderness to palpation throughout the cervical spine: negative bilaterally        Cervical spine Special tests  Neuro tension  Spurling's maneuver negative bilaterally           Key points for the international standards for neurological classification of spinal cord injury (ISNCSCI) to light touch.     Dermatome R L   C4 2 2   C5 2 2   C6 2 2   C7 2 2   C8 2 2   T1 2 2   T2 2 2                                         Motor Exam Upper Extremities   ? Myotome R L   Shoulder flexion C5 5 5   Elbow flexion C5 5 5   Wrist extension C6 5 5   Elbow extension C7 5 5   Finger flexion C8 5 5   Finger abduction T1 5 5             MEDICAL DECISION MAKING    DATA    Labs:   Lab Results   Component Value Date/Time    SODIUM 138 04/25/2021 10:27 AM    POTASSIUM 3.8 04/25/2021 10:27 AM    CHLORIDE 105 04/25/2021 10:27 AM    CO2 27 04/25/2021 10:27 AM    GLUCOSE 112 (H) 04/25/2021 10:27 AM    BUN 14 04/25/2021 10:27 AM    CREATININE 0.91 04/25/2021 10:27 AM        No results found for: PROTHROMBTM, INR     Lab Results   Component Value Date/Time    WBC 10.1 04/25/2021 10:27 AM    RBC 4.65 (L) 04/25/2021 10:27 AM    HEMOGLOBIN 14.3 04/25/2021 10:27 AM    HEMATOCRIT 44.0 04/25/2021 10:27 AM    MCV 94.6 04/25/2021 10:27 AM    MCH 30.8 04/25/2021 10:27 AM    MCHC 32.5 (L) 04/25/2021 10:27 AM    MPV 10.1 04/25/2021 10:27 AM    NEUTSPOLYS 49.60 04/25/2021 10:27 AM    LYMPHOCYTES 31.30 04/25/2021 10:27 AM    MONOCYTES 6.60 04/25/2021 10:27 AM    EOSINOPHILS 11.50 (H) 04/25/2021 10:27 AM    BASOPHILS 0.60 04/25/2021 10:27 AM        No results found for: HBA1C       Imaging:   I personally reviewed following images  I reviewed the  fluoroscopic images from 8/16/2021 showing successful C7-T1 epidural steroid injection.  I reviewed this with the patient on 9/14/2021      MRI cervical spine 3/12/2021 there is a left paracentral disc herniation at C6-7 consistent with a left C7 radiculopathy.    I reviewed the following radiology reports          Results for orders placed during the hospital encounter of 02/20/21    MR-BRAIN-W/O    Impression  MRI of the brain without contrast within normal limits.             Results for orders placed during the hospital encounter of 03/12/21    MR-CERVICAL SPINE-W/O    Impression  1.  C3-4 negligible right paramedian disc bulge. No central or foraminal stenosis.  2.  C6-C7 moderate-sized left paramedian disc protrusion with moderate sized component of left-sided caudad disc extrusion extending down to at least the mid C7 vertebral body level. Marked compromise of the left lateral recess.      Results for orders placed during the hospital encounter of 08/14/21    MR-LUMBAR SPINE-W/O    Impression  1.  Degenerative changes of the lower lumbar spine as described above  2.  Disc disease at L5-S1 approximates but does not definitely contact the traversing BILATERAL S1 nerve root  3.  No areas of high-grade central canal narrowing. Mild central canal narrowing at L5-S1.  4.  Mild to moderate BILATERAL neural foraminal narrowing at L5-S1        Results for orders placed during the hospital encounter of 08/14/21    MR-LUMBAR SPINE-W/O    Impression  1.  Degenerative changes of the lower lumbar spine as described above  2.  Disc disease at L5-S1 approximates but does not definitely contact the traversing BILATERAL S1 nerve root  3.  No areas of high-grade central canal narrowing. Mild central canal narrowing at L5-S1.  4.  Mild to moderate BILATERAL neural foraminal narrowing at L5-S1                                                Results for orders placed during the hospital encounter of 02/17/21    CT-CTA NECK WITH &  W/O-POST PROCESSING    Impression  CT angiogram of the neck within normal limits.                         Results for orders placed during the hospital encounter of 19    CT-ABDOMEN-PELVIS WITH    Impression  1.  No acute abnormality.                                                                  DIAGNOSIS   Visit Diagnoses     ICD-10-CM   1. Cervical radiculopathy  M54.12   2. Chronic bilateral low back pain, unspecified whether sciatica present  M54.5    G89.29   3. Lumbar radiculopathy  M54.16         ASSESSMENT and PLAN:     Santo Hernandez  1995 male      Santo was seen today for follow-up.    Diagnoses and all orders for this visit:    Cervical radiculopathy  -     REFERRAL TO PHYSICAL THERAPY    Chronic bilateral low back pain, unspecified whether sciatica present  -     REFERRAL TO PHYSICAL THERAPY    Lumbar radiculopathy  -     REFERRAL TO PHYSICAL THERAPY        The patient has significant improvement in his cervical radiculopathy and neck pain as well as arm pain numbness tingling after the epidural steroid injection.  Strength and sensation improved significantly.    Low back pain is stable.  Lumbar radiculopathy is stable.    I believe the patient could now resume physical therapy.  We discussed his home exercise program    He could use Tylenol 1000 mg up to 3 times daily as needed pain.  Not to exceed this dose.    Follow up: 1 year or sooner as needed    Thank you for allowing me to participate in the care of this patient. If you have any questions please not hesitate to contact me.             Please note that this dictation was created using voice recognition software. I have made every reasonable attempt to correct obvious errors but there may be errors of grammar and content that I may have overlooked prior to finalization of this note.      Schuyler Hussein MD  Interventional Spine and Sports Physiatry  Physical Medicine and Rehabilitation  [facility]

## 2021-09-27 ENCOUNTER — TELEPHONE (OUTPATIENT)
Dept: PHYSICAL THERAPY | Facility: MEDICAL CENTER | Age: 26
End: 2021-09-27

## 2021-09-27 NOTE — OP THERAPY DISCHARGE SUMMARY
Outpatient Physical Therapy  DISCHARGE SUMMARY NOTE      [facility]  [address]  [place] [address]  Phone:  [phone]  Fax:  [phone]    Date of Visit: 09/27/2021    Patient: Santo Hernandez  YOB: 1995  MRN: [id number]     Referring Provider: No referring provider defined for this encounter.   Referring Diagnosis No admission diagnoses are documented for this encounter.         Functional Assessment Used        Your patient is being discharged from Physical Therapy with the following comments:   · Progress plateau    Comments:  Patient was referred to physiatry at final PT appt.        Recommendations:  D/C per policy as patient has not been seen in greater than 30 days.     Shireen Robles, PT, DPT    Date: 9/27/2021

## 2021-09-29 ENCOUNTER — PATIENT MESSAGE (OUTPATIENT)
Dept: MEDICAL GROUP | Facility: MEDICAL CENTER | Age: 26
End: 2021-09-29

## 2021-09-29 ENCOUNTER — HOSPITAL ENCOUNTER (OUTPATIENT)
Dept: RADIOLOGY | Facility: MEDICAL CENTER | Age: 26
End: 2021-09-29
Attending: PHYSICAL MEDICINE & REHABILITATION
Payer: COMMERCIAL

## 2021-09-29 DIAGNOSIS — M54.16 LUMBAR RADICULOPATHY: ICD-10-CM

## 2021-09-29 DIAGNOSIS — R29.2 HYPERREFLEXIA: ICD-10-CM

## 2021-09-29 DIAGNOSIS — R29.898 LEFT LEG WEAKNESS: ICD-10-CM

## 2021-09-29 DIAGNOSIS — G47.33 OSA (OBSTRUCTIVE SLEEP APNEA): ICD-10-CM

## 2021-09-29 DIAGNOSIS — R20.0 LEFT LEG NUMBNESS: ICD-10-CM

## 2021-09-29 PROCEDURE — 72100 X-RAY EXAM L-S SPINE 2/3 VWS: CPT

## 2021-10-19 ENCOUNTER — PHYSICAL THERAPY (OUTPATIENT)
Dept: PHYSICAL THERAPY | Facility: MEDICAL CENTER | Age: 26
End: 2021-10-19
Attending: PHYSICAL MEDICINE & REHABILITATION
Payer: COMMERCIAL

## 2021-10-19 DIAGNOSIS — M54.16 LUMBAR RADICULOPATHY: ICD-10-CM

## 2021-10-19 DIAGNOSIS — M54.50 CHRONIC BILATERAL LOW BACK PAIN, UNSPECIFIED WHETHER SCIATICA PRESENT: ICD-10-CM

## 2021-10-19 DIAGNOSIS — G89.29 CHRONIC BILATERAL LOW BACK PAIN, UNSPECIFIED WHETHER SCIATICA PRESENT: ICD-10-CM

## 2021-10-19 DIAGNOSIS — M54.12 CERVICAL RADICULOPATHY: ICD-10-CM

## 2021-10-19 PROCEDURE — 97162 PT EVAL MOD COMPLEX 30 MIN: CPT

## 2021-10-19 ASSESSMENT — ENCOUNTER SYMPTOMS
PAIN SCALE: 2
QUALITY: THROBBING
QUALITY: ACHING
PAIN SCALE AT HIGHEST: 5

## 2021-10-19 NOTE — OP THERAPY EVALUATION
Outpatient Physical Therapy  INITIAL EVALUATION    [facility]  [address]  [place] [address]  Phone:  [phone]  Fax:  [phone]    Date of Evaluation: 10/19/2021    Patient: Santo Hernandez  YOB: 1995  MRN: [id number]     Referring Provider: Schuyler Hussein M.D.  [address]  [address]  [place] [address]   Referring Diagnosis Cervical radiculopathy [M54.12];Chronic bilateral low back pain, unspecified whether sciatica present [M54.50, G89.29];Lumbar radiculopathy [M54.16]     Time Calculation  Start time: 0306  Stop time: 0344 Time Calculation (min): 38 minutes     .    5 min late to appt start time  Chief Complaint: No chief complaint on file.    Visit Diagnoses     ICD-10-CM   1. Cervical radiculopathy  M54.12   2. Chronic bilateral low back pain, unspecified whether sciatica present  M54.50    G89.29   3. Lumbar radiculopathy  M54.16       Date of onset of impairment: 10/19/2020    Subjective:   History of Present Illness:     Mechanism of injury:  Patient is a 26 year old male previously known to this therapist who has been treated for cervical radiculopathy in the past. Patient underwent a 8/16/2021 C7-T1 interlaminar epidural steroid injection.   Patient presents to outpatient PT 5 min late to appt.      Patient reports he had his injection and felt some relief 2-3 weeks later. He reports he has been consistent performing his HEP.  He reports if he does his exercises every day he has an increase in pain.      Patient reports he had a follow up with Dr. Hussein a few weeks after his injection.      Patient reports his neck pain is the most uncomfortable, but the back still a problem.      Patient reported persistent numbness down the back of arm down his pinkie and ring finger. He reports it began about 1-2 weeks prior to the injection.      Headaches:  no headaches  Sleep disturbance:  Not disrupted  Pain:      Current pain ratin    At worst pain ratin    Quality:  Throbbing and aching    Alleviating factors: snow lisandra exercise on foam roller, standing more.    Exacerbated by: sitting up straight for long periods - neck ms fatigue,  lying back with head propped up in flexion.  Social Support:     Lives in:  One-story house    Lives with: Father   Hand dominance:  Right  Diagnostic Tests:     Diagnostic Tests Comments:  1.  C3-4 negligible right paramedian disc bulge. No central or foraminal stenosis.  2.  C6-C7 moderate-sized left paramedian disc protrusion with moderate sized component of left-sided caudad disc extrusion extending down to at least the mid C7 vertebral body level. Marked compromise of the left lateral recess.    MR-LUMBAR SPINE-W/O     Impression  1.  Degenerative changes of the lower lumbar spine as described above  2.  Disc disease at L5-S1 approximates but does not definitely contact the traversing BILATERAL S1 nerve root  3.  No areas of high-grade central canal narrowing. Mild central canal narrowing at L5-S1.  4.  Mild to moderate BILATERAL neural foraminal narrowing at L5-S1  Treatments:     Previous treatment:  Physical therapy  Activities of Daily Living:     Patient reported ADL status: Patient reports he is not currently employed at this time. He reports he is just going stuff around the house. He goes to grocery store, runs errands  Patient Goals:     Patient goals for therapy:  Decreased pain      Past Medical History:   Diagnosis Date   • Asthma    • B12 deficiency 10/12/2018   • Eczema    • GERD (gastroesophageal reflux disease)    • BOB (obstructive sleep apnea) 10/12/2018     Past Surgical History:   Procedure Laterality Date   • [place] INJ CERV/THORAC,W/ IMAGING  2021    Procedure: C7-T1 interlaminar epidural steroid injection;  Surgeon: Schuyler Hussein M.D.;  [facility];  Service: Pain Management     Social History     Tobacco Use   • Smoking  status: Former Smoker   • Smokeless tobacco: Never Used   Substance Use Topics   • Alcohol use: Not Currently     Family and Occupational History     Socioeconomic History   • Marital status: Single     Spouse name: Not on file   • Number of children: Not on file   • Years of education: Not on file   • Highest education level: Not on file   Occupational History   • Not on file       Objective     Neurological Testing     Myotome testing   Cervical (left)   C6 (biceps): 4  C7 (triceps): 4  C8 (thumb extension): 4-  T1 (intrinsics): 4-          Dermatome testing   Cervical (left)   All left cervical dermatomes intact    Cervical (right)   All right cervical dermatomes intact    Additional Neurological Details  Patient reports decreased sensation in lateral 2 fingers and along postior tricep   Patient does report occasional scuffing of left toes when he wears sandals. He does not notice this with shoes   Increased difficulty with heel walking on left, but patient able to perform the task.     Active Range of Motion     Cervical Spine   Flexion: within functional limits  Extension: within functional limits  Retraction: within functional limits  Left lateral flexion: within functional limits  Right lateral flexion: within functional limits  Left rotation: within functional limits (achiness noted with left rotation)  Right rotation: within functional limits    Lumbar   Flexion: within functional limits  Extension: within functional limits  Left lateral flexion: within functional limits  Right lateral flexion: within functional limits    Additional Active Range of Motion Details  Continued mild radicular symptoms  down L5 dermatom.      Strength:      Left Wrist/Hand    (2nd hand position)     Trial 1: 52    Trial 2: 45    Trial 3: 46    Average: 45.67    Right Wrist/Hand    (2nd hand position)     Trial 1: 70    Trial 2: 65    Trial 3: 62    Average: 65.67    Tests       Lumbar spine (left)      Negative slump.  "  Lumbar spine (right)     Negative slump.     Left Hip   SLR: Negative.     Right Hip   SLR: Negative.         Therapeutic Exercises (CPT 44476):     1. Review of past HEP     2. Patient doing foam roller exercises    3. Patient reports pain with flasher HEP , d/c flasher exercise     9. 12/14/21      Time-based treatments/modalities:           Assessment, Response and Plan:   Impairments: activity intolerance, impaired physical strength, lacks appropriate home exercise program and pain with function    Assessment details:  Patient is a 26 year old male previously known to this therapist who presents to outpatient PT with complaints of C7 and L5-S1 radicular symptoms. Patient would benefit from skilled PT with a focus on the deficits above to decrease pain, improved strength, improve activity tolerance and ultimately improve patients quality of life.   Prognosis: fair    Goals:   Short Term Goals:   1. Patient will be independent in HEP with written instructions.   Short term goal time span:  2-4 weeks      Long Term Goals:    1. Patient will report being able to sit for greater than 2 hours without feeling like he is having to \"prop his head up\"  2.  Patient will report being able to sit for greater than 2 hours without being limited by back pain.   3.  Patient will be independent in pain management and upgraded HEP.   4. Patient will be able to ambulate greater than 1-2 hours without being limited by back or leg pain for age appropriate mobility expectations.   Long term goal time span:  6-8 weeks    Plan:   Therapy options:  Physical therapy treatment to continue  Planned therapy interventions:  Neuromuscular Re-education (CPT 17597), E Stim Attended (CPT 59934), Mechanical Traction (CPT 35247), Therapeutic Activities (CPT 38022), Therapeutic Exercise (CPT 56087) and Manual Therapy (CPT 50332)  Frequency:  2x week  Duration in weeks:  8  Discussed with:  Patient      Functional Assessment Used        Referring " provider co-signature:  I have reviewed this plan of care and my co-signature certifies the need for services.    Certification Period: 10/19/2021 to  12/14/21    Physician Signature: ________________________________ Date: ______________

## 2021-10-21 ENCOUNTER — PHYSICAL THERAPY (OUTPATIENT)
Dept: PHYSICAL THERAPY | Facility: MEDICAL CENTER | Age: 26
End: 2021-10-21
Attending: PHYSICAL MEDICINE & REHABILITATION
Payer: COMMERCIAL

## 2021-10-21 DIAGNOSIS — G89.29 CHRONIC BILATERAL LOW BACK PAIN, UNSPECIFIED WHETHER SCIATICA PRESENT: ICD-10-CM

## 2021-10-21 DIAGNOSIS — M54.50 CHRONIC BILATERAL LOW BACK PAIN, UNSPECIFIED WHETHER SCIATICA PRESENT: ICD-10-CM

## 2021-10-21 DIAGNOSIS — M54.16 LUMBAR RADICULOPATHY: ICD-10-CM

## 2021-10-21 DIAGNOSIS — M54.12 CERVICAL RADICULOPATHY: ICD-10-CM

## 2021-10-21 PROCEDURE — 97012 MECHANICAL TRACTION THERAPY: CPT

## 2021-10-21 PROCEDURE — 97110 THERAPEUTIC EXERCISES: CPT

## 2021-10-21 NOTE — OP THERAPY DAILY TREATMENT
Outpatient Physical Therapy  DAILY TREATMENT     [facility]  [address]  [place] [address]  Phone:  [phone]  Fax:  [phone]    Date: 10/21/2021    Patient: Santo Hernandez  YOB: 1995  MRN: [id number]     Time Calculation    Start time: 0340  Stop time: 0430 Time Calculation (min): 50 minutes         Chief Complaint: No chief complaint on file.    Visit #: 2    SUBJECTIVE:  I am doing okay.     OBJECTIVE:  Current objective measures:           Therapeutic Exercises (CPT 08463):     1. Reviewed current HEP on foam roller     2. Added supine marching on foam roller     3. Supine bird dog on foam roller     4. Bridge     5. Levator scap     6. UT stretch- no OP due to + neural tension    9. 12/14/21      Therapeutic Exercise Summary: Access Code: [email or web address]  URL: [email or web address]  Date: 10/21/2021  Prepared by: Shireen Robles    Exercises  Supine March on Foam Roll - 1 x daily - 7 x weekly - 3 sets - 10 reps  Foam Roll Dying Bug - 1 x daily - 7 x weekly - 3 sets - 10 reps  Supine Bridge - 1 x daily - 7 x weekly - 2 sets - 10 reps  Seated Gentle Upper Trapezius Stretch - 1 x daily - 7 x weekly - 1 sets - 2 reps - 30 hold  Seated Levator Scapulae Stretch - 1 x daily - 7 x weekly - 1 sets - 2 reps - 30 hold        Time-based treatments/modalities:    Physical Therapy Timed Treatment Charges  Therapeutic exercise minutes (CPT 28571): 38 minutes      ASSESSMENT:   Response to treatment:   Patient tolerates moderate level ther ex well. He reports he has not been sleeping well and has increased pain when his sleep is disturbed.                PLAN/RECOMMENDATIONS:   Plan for treatment: therapy treatment to continue next visit.  Planned interventions for next visit: continue with current treatment.

## 2021-10-25 ENCOUNTER — PHYSICAL THERAPY (OUTPATIENT)
Dept: PHYSICAL THERAPY | Facility: MEDICAL CENTER | Age: 26
End: 2021-10-25
Attending: PHYSICAL MEDICINE & REHABILITATION
Payer: COMMERCIAL

## 2021-10-25 DIAGNOSIS — M54.12 CERVICAL RADICULOPATHY: ICD-10-CM

## 2021-10-25 DIAGNOSIS — G89.29 CHRONIC BILATERAL LOW BACK PAIN, UNSPECIFIED WHETHER SCIATICA PRESENT: ICD-10-CM

## 2021-10-25 DIAGNOSIS — M54.50 CHRONIC BILATERAL LOW BACK PAIN, UNSPECIFIED WHETHER SCIATICA PRESENT: ICD-10-CM

## 2021-10-25 DIAGNOSIS — M54.16 LUMBAR RADICULOPATHY: ICD-10-CM

## 2021-10-25 PROCEDURE — 97110 THERAPEUTIC EXERCISES: CPT

## 2021-10-25 PROCEDURE — 97012 MECHANICAL TRACTION THERAPY: CPT

## 2021-10-25 NOTE — OP THERAPY DAILY TREATMENT
Outpatient Physical Therapy  DAILY TREATMENT     [facility] Outpatient Physical Therapy  [address]  [place] [address]  Phone:  [phone]  Fax:  [phone]    Date: 10/25/2021    Patient: Santo Hernandez  YOB: 1995  MRN: [id number]     Time Calculation    Start time: 1502  Stop time: 1555 Time Calculation (min): 53 minutes         Chief Complaint: No chief complaint on file.    Visit #: 3    SUBJECTIVE:  I am doing okay. I am sleeping better.     OBJECTIVE:  Current objective measures:           Therapeutic Exercises (CPT 97528):     1. Reviewed current HEP on foam roller     2. Added supine marching on foam roller     3. Supine bird dog on foam roller     4. Lumbar stab with cuff     5. Levator scap     6. UT stretch- no OP due to + neural tension    7. Ambulation on treadmill 2.5-3. mph x 8 min     8. Cat and camel     9. 12/14/21      Therapeutic Exercise Summary: Access Code: [email or web address]  URL: [email or web address]  Date: 10/21/2021  Prepared by: Shireen Robles    Exercises  Supine March on Foam Roll - 1 x daily - 7 x weekly - 3 sets - 10 reps  Foam Roll Dying Bug - 1 x daily - 7 x weekly - 3 sets - 10 reps  Supine Bridge - 1 x daily - 7 x weekly - 2 sets - 10 reps  Seated Gentle Upper Trapezius Stretch - 1 x daily - 7 x weekly - 1 sets - 2 reps - 30 hold  Seated Levator Scapulae Stretch - 1 x daily - 7 x weekly - 1 sets - 2 reps - 30 hold      Therapeutic Treatments and Modalities:     1. Mechanical Traction (CPT 36085), 80/60#  60/20 sec with heat x 15 min     Time-based treatments/modalities:    Physical Therapy Timed Treatment Charges  Therapeutic exercise minutes (CPT 38055): 40 minutes    ASSESSMENT:   Response to treatment: Patient demonstrates poor lumbar/ hip dissociation and poor lumbar mobility. Patient reported at this visit that he does not have that much pain and it really depends on how much he sleeps.     PLAN/RECOMMENDATIONS:    Plan for treatment: therapy treatment to continue next visit.  Planned interventions for next visit: continue with current treatment.

## 2021-11-02 ENCOUNTER — PHYSICAL THERAPY (OUTPATIENT)
Dept: PHYSICAL THERAPY | Facility: MEDICAL CENTER | Age: 26
End: 2021-11-02
Attending: PHYSICAL MEDICINE & REHABILITATION
Payer: COMMERCIAL

## 2021-11-02 DIAGNOSIS — G89.29 CHRONIC BILATERAL LOW BACK PAIN, UNSPECIFIED WHETHER SCIATICA PRESENT: ICD-10-CM

## 2021-11-02 DIAGNOSIS — M54.50 CHRONIC BILATERAL LOW BACK PAIN, UNSPECIFIED WHETHER SCIATICA PRESENT: ICD-10-CM

## 2021-11-02 DIAGNOSIS — M54.12 CERVICAL RADICULOPATHY: ICD-10-CM

## 2021-11-02 DIAGNOSIS — M54.16 LUMBAR RADICULOPATHY: ICD-10-CM

## 2021-11-02 PROCEDURE — 97110 THERAPEUTIC EXERCISES: CPT

## 2021-11-02 PROCEDURE — 97012 MECHANICAL TRACTION THERAPY: CPT

## 2021-11-02 NOTE — OP THERAPY DAILY TREATMENT
Outpatient Physical Therapy  DAILY TREATMENT     [facility] Outpatient Physical Therapy  [address]  [place] [address]  Phone:  [phone]  Fax:  [phone]    Date: 11/02/2021    Patient: Santo Hernandez  YOB: 1995  MRN: [id number]     Time Calculation    Start time: 1500  Stop time: 1550 Time Calculation (min): 50 minutes         Chief Complaint: No chief complaint on file.    Visit #: 4    SUBJECTIVE:  I am doing better overall. I slept weird so I am hurting a bit right now.  Doing the exercises help with the pain as long as I sleep right.     OBJECTIVE:  Current objective measures:   Patient reports he is doing 20+ minutes on the elliptical each day.    Patient reports when he is having neck or back pain he lies on the foam roller.            Therapeutic Exercises (CPT 54384):     1. Standing rows , pink tband 20     2. Alternating rows anti rotation, pink tband 20x     3. Supine bird dog on foam roller     4. Lumbar stab with cuff     5. Levator scap     7. Ambulation on treadmill 2.5-3. mph x 8 min , 12 min for functional endurance training    8. Cat and camel     9. 12/14/21      Therapeutic Exercise Summary: Access Code: [email or web address]  URL: [email or web address]  Date: 10/21/2021  Prepared by: Shireen Robles    Exercises  Supine March on Foam Roll - 1 x daily - 7 x weekly - 3 sets - 10 reps  Foam Roll Dying Bug - 1 x daily - 7 x weekly - 3 sets - 10 reps  Supine Bridge - 1 x daily - 7 x weekly - 2 sets - 10 reps  Seated Gentle Upper Trapezius Stretch - 1 x daily - 7 x weekly - 1 sets - 2 reps - 30 hold  Seated Levator Scapulae Stretch - 1 x daily - 7 x weekly - 1 sets - 2 reps - 30 hold      Therapeutic Treatments and Modalities:     1. Mechanical Traction (CPT 43980), 85/60#  60/20 sec with heat x 15 min     Time-based treatments/modalities:    Physical Therapy Timed Treatment Charges  Therapeutic exercise minutes (CPT 71230): 30  minutes      ASSESSMENT:   Response to treatment: Patient is demonstrating good tolerance for ther ex. He reports decreased pain, but continues to report numbness.     PLAN/RECOMMENDATIONS:   Plan for treatment: therapy treatment to continue next visit.  Planned interventions for next visit: continue with current treatment.

## 2021-11-04 ENCOUNTER — PHYSICAL THERAPY (OUTPATIENT)
Dept: PHYSICAL THERAPY | Facility: MEDICAL CENTER | Age: 26
End: 2021-11-04
Attending: PHYSICAL MEDICINE & REHABILITATION
Payer: COMMERCIAL

## 2021-11-04 DIAGNOSIS — G89.29 CHRONIC BILATERAL LOW BACK PAIN, UNSPECIFIED WHETHER SCIATICA PRESENT: ICD-10-CM

## 2021-11-04 DIAGNOSIS — M54.50 CHRONIC BILATERAL LOW BACK PAIN, UNSPECIFIED WHETHER SCIATICA PRESENT: ICD-10-CM

## 2021-11-04 DIAGNOSIS — M54.12 CERVICAL RADICULOPATHY: ICD-10-CM

## 2021-11-04 DIAGNOSIS — M54.16 LUMBAR RADICULOPATHY: ICD-10-CM

## 2021-11-04 PROCEDURE — 97110 THERAPEUTIC EXERCISES: CPT

## 2021-11-04 PROCEDURE — 97012 MECHANICAL TRACTION THERAPY: CPT

## 2021-11-04 NOTE — OP THERAPY DAILY TREATMENT
Outpatient Physical Therapy  DAILY TREATMENT     [facility] Outpatient Physical Therapy  [address]  [place] [address]  Phone:  [phone]  Fax:  [phone]    Date: 11/04/2021    Patient: Santo Hernandez  YOB: 1995  MRN: [id number]     Time Calculation    Start time: 1500  Stop time: 1555 Time Calculation (min): 55 minutes         Chief Complaint: No chief complaint on file.    Visit #: 5    SUBJECTIVE:  My back pain is better. The numbness in my arm is still an issue, and I still get it in my leg, but the pain is better. I would say it is about 70%.     OBJECTIVE:  Current objective measures:           Therapeutic Exercises (CPT 84534):     1. Standing rows , pink tband 20     2. Alternating rows anti rotation, single arm with green tband 20 x     3. Supine bird dog on foam roller     4. TRX rows , 20x    6. Multifidus walk away 12# , 2 x 10     7. Ambulation on treadmill 2.5-3. mph x 8 min , 12 min for functional endurance training    8. Cat and camel       Therapeutic Exercise Summary: Access Code: [email or web address]  URL: [email or web address]  Date: 10/21/2021  Prepared by: Shireen Robles    Exercises  Supine March on Foam Roll - 1 x daily - 7 x weekly - 3 sets - 10 reps  Foam Roll Dying Bug - 1 x daily - 7 x weekly - 3 sets - 10 reps  Supine Bridge - 1 x daily - 7 x weekly - 2 sets - 10 reps  Seated Gentle Upper Trapezius Stretch - 1 x daily - 7 x weekly - 1 sets - 2 reps - 30 hold  Seated Levator Scapulae Stretch - 1 x daily - 7 x weekly - 1 sets - 2 reps - 30 hold      Therapeutic Treatments and Modalities:     1. Mechanical Traction (CPT 61104), 85/60#  60/20 sec with heat x 15 min     Time-based treatments/modalities:        ASSESSMENT:   Response to treatment:   Patient is reporting decreased back pain with PT. He continues to have complaints of numbness, but his pain is improved per his report.     PLAN/RECOMMENDATIONS:   Plan for treatment:  therapy treatment to continue next visit.  Planned interventions for next visit: continue with current treatment.

## 2021-11-08 ENCOUNTER — PHYSICAL THERAPY (OUTPATIENT)
Dept: PHYSICAL THERAPY | Facility: MEDICAL CENTER | Age: 26
End: 2021-11-08
Attending: PHYSICAL MEDICINE & REHABILITATION
Payer: COMMERCIAL

## 2021-11-08 DIAGNOSIS — M54.12 CERVICAL RADICULOPATHY: ICD-10-CM

## 2021-11-08 DIAGNOSIS — M54.16 LUMBAR RADICULOPATHY: ICD-10-CM

## 2021-11-08 DIAGNOSIS — M54.50 CHRONIC BILATERAL LOW BACK PAIN, UNSPECIFIED WHETHER SCIATICA PRESENT: ICD-10-CM

## 2021-11-08 DIAGNOSIS — G89.29 CHRONIC BILATERAL LOW BACK PAIN, UNSPECIFIED WHETHER SCIATICA PRESENT: ICD-10-CM

## 2021-11-08 PROCEDURE — 97012 MECHANICAL TRACTION THERAPY: CPT

## 2021-11-08 PROCEDURE — 97110 THERAPEUTIC EXERCISES: CPT

## 2021-11-08 NOTE — OP THERAPY DAILY TREATMENT
Outpatient Physical Therapy  DAILY TREATMENT     [facility] Outpatient Physical Therapy  [address]  [place] [address]  Phone:  [phone]  Fax:  [phone]    Date: 11/08/2021    Patient: Santo Hernandez  YOB: 1995  MRN: [id number]     Time Calculation    Start time: 1454  Stop time: 1544 Time Calculation (min): 50 minutes         Chief Complaint: No chief complaint on file.    Visit #: 6    SUBJECTIVE:  I am doing okay. I think the pain is better.     OBJECTIVE:  C  Therapeutic Exercises (CPT 63983):     1. Standing rows , pink tband 20     2. Alternating rows anti rotation, single arm with green tband 20 x     3. Supine bird dog on foam roller     4. TRX rows , 20x    5. Sidelying resisted hip abduction with pink tband     6. Multifidus walk away 12# , 2 x 10     7. Ambulation on treadmill 2.5-3. mph x 8 min , 12 min for functional endurance training    8. Cat and camel     9. Bridge on swiss ball , 10 sec x 10 reps     10. Bridge plus hamstring curl , 2 x 10       Therapeutic Exercise Summary: Access Code: [email or web address]  URL: [email or web address]  Date: 10/21/2021  Prepared by: Shireen Robles    Exercises  Supine March on Foam Roll - 1 x daily - 7 x weekly - 3 sets - 10 reps  Foam Roll Dying Bug - 1 x daily - 7 x weekly - 3 sets - 10 reps  Supine Bridge - 1 x daily - 7 x weekly - 2 sets - 10 reps  Seated Gentle Upper Trapezius Stretch - 1 x daily - 7 x weekly - 1 sets - 2 reps - 30 hold  Seated Levator Scapulae Stretch - 1 x daily - 7 x weekly - 1 sets - 2 reps - 30 hold      Therapeutic Treatments and Modalities:     1. Mechanical Traction (CPT 58650), 85/60#  60/20 sec with heat x 15 min     Time-based treatments/modalities:    Physical Therapy Timed Treatment Charges  Therapeutic exercise minutes (CPT 02861): 38 minutes          ASSESSMENT:   Response to treatment: Patient is progressing well overall. Continue with one more PT visit and then  plan to d/c to HEP.     PLAN/RECOMMENDATIONS:   Plan for treatment: therapy treatment to continue next visit.  Planned interventions for next visit: continue with current treatment.

## 2021-11-10 ENCOUNTER — PHYSICAL THERAPY (OUTPATIENT)
Dept: PHYSICAL THERAPY | Facility: MEDICAL CENTER | Age: 26
End: 2021-11-10
Attending: PHYSICAL MEDICINE & REHABILITATION
Payer: COMMERCIAL

## 2021-11-10 DIAGNOSIS — M54.16 LUMBAR RADICULOPATHY: ICD-10-CM

## 2021-11-10 DIAGNOSIS — M54.50 CHRONIC BILATERAL LOW BACK PAIN, UNSPECIFIED WHETHER SCIATICA PRESENT: ICD-10-CM

## 2021-11-10 DIAGNOSIS — G89.29 CHRONIC BILATERAL LOW BACK PAIN, UNSPECIFIED WHETHER SCIATICA PRESENT: ICD-10-CM

## 2021-11-10 DIAGNOSIS — M54.12 CERVICAL RADICULOPATHY: ICD-10-CM

## 2021-11-10 PROCEDURE — 97012 MECHANICAL TRACTION THERAPY: CPT

## 2021-11-10 PROCEDURE — 97110 THERAPEUTIC EXERCISES: CPT

## 2021-11-10 NOTE — OP THERAPY DISCHARGE SUMMARY
Outpatient Physical Therapy  DISCHARGE SUMMARY NOTE      [facility] Outpatient Physical Therapy  [address]  [place] [address]  Phone:  [phone]  Fax:  [phone]    Date of Visit: 11/10/2021    Patient: Santo Hernandez  YOB: 1995  MRN: [id number]     Referring Provider: Schuyler Hussein M.D.  [address]  [address]  [place] [address]   Referring Diagnosis Cervical radiculopathy [M54.12];Chronic bilateral low back pain, unspecified whether sciatica present [M54.50, G89.29];Lumbar radiculopathy [M54.16]         Functional Assessment Used        Your patient is being discharged from Physical Therapy with the following comments:   · Goals met   · Progress plateau    Comments:  Patient reports his back pain is significantly improved, but continues to report LE incoordination and neck pain and radicular symptoms which have not improved.     Recommendations:  D/C from PT at this time.     Shireen Robles, PT, DPT    Date: 11/10/2021

## 2021-11-10 NOTE — OP THERAPY DAILY TREATMENT
"  Outpatient Physical Therapy  DAILY TREATMENT     [facility] Outpatient Physical Therapy  [address]  [place] [address]  Phone:  [phone]  Fax:  [phone]    Date: 11/10/2021    Patient: Santo Hernandez  YOB: 1995  MRN: [id number]     Time Calculation    Start time: 1500  Stop time: 1545 Time Calculation (min): 45 minutes         Chief Complaint: No chief complaint on file.    Visit #: 7    SUBJECTIVE:  I am okay. I think I misunderstood, I did not realize that therapy was to help me do this on my own.     OBJECTIVE:  Current objective measures:         Therapeutic Exercises (CPT 12410):     1. Standing rows , pink tband 20     2. Alternating rows anti rotation, single arm with green tband 20 x     3. Supine bird dog on foam roller     4. TRX rows , 20x    5. Sidelying resisted hip abduction with pink tband     6. Multifidus walk away 12# , 2 x 10     7. Ambulation on treadmill 2.5-3. mph x 8 min , 12 min for functional endurance training    8. Cat and camel     9. Bridge on swiss ball , 10 sec x 10 reps     10. Bridge plus hamstring curl , 2 x 10       Therapeutic Exercise Summary: Access Code: [email or web address]  URL: [email or web address]  Date: 10/21/2021  Prepared by: Shireen Robles    Exercises  Supine March on Foam Roll - 1 x daily - 7 x weekly - 3 sets - 10 reps  Foam Roll Dying Bug - 1 x daily - 7 x weekly - 3 sets - 10 reps  Supine Bridge - 1 x daily - 7 x weekly - 2 sets - 10 reps  Seated Gentle Upper Trapezius Stretch - 1 x daily - 7 x weekly - 1 sets - 2 reps - 30 hold  Seated Levator Scapulae Stretch - 1 x daily - 7 x weekly - 1 sets - 2 reps - 30 hold      Therapeutic Treatments and Modalities:     1. Mechanical Traction (CPT 46337), 85/60#  60/20 sec with heat x 15 min     Long Term Goals:    1. Patient will report being able to sit for greater than 2 hours without feeling like he is having to \"prop his head up\" Goal not met.    2.  " Patient will report being able to sit for greater than 2 hours without being limited by back pain. Goal Met, patient reports he is able to sit for a few hours without being limited by back.   3.  Patient will be independent in pain management and upgraded HEP. Goal Met  4. Patient will be able to ambulate greater than 1-2 hours without being limited by back or leg pain for age appropriate mobility expectations. Goal met, but patient continues to have leg pain that is mostly affected by his sleep.      Time-based treatments/modalities:    Physical Therapy Timed Treatment Charges  Therapeutic exercise minutes (CPT 78883): 24 minutes      ASSESSMENT:   Response to treatment:   Patient reports his back pain is mostly resolved. He reports that his neck pain continues to be an issue, but it has not changed at all with therapy. Patient reports he would consider an epidural if deemed appropriate.    Patient reports he is compliant with his HEP and tries to do it every day. He reports he spends about 30 minutes doing his exercises on a daily basis.      PLAN/RECOMMENDATIONS:   Plan for treatment: discharge patient due to accomplished goals.

## 2022-02-09 ENCOUNTER — OFFICE VISIT (OUTPATIENT)
Dept: URGENT CARE | Facility: CLINIC | Age: 27
End: 2022-02-09
Payer: COMMERCIAL

## 2022-02-09 VITALS
BODY MASS INDEX: 24.5 KG/M2 | SYSTOLIC BLOOD PRESSURE: 140 MMHG | HEIGHT: 71 IN | OXYGEN SATURATION: 96 % | DIASTOLIC BLOOD PRESSURE: 90 MMHG | TEMPERATURE: 97.8 F | WEIGHT: 175 LBS | HEART RATE: 106 BPM | RESPIRATION RATE: 16 BRPM

## 2022-02-09 DIAGNOSIS — K21.9 GASTROESOPHAGEAL REFLUX DISEASE, UNSPECIFIED WHETHER ESOPHAGITIS PRESENT: ICD-10-CM

## 2022-02-09 PROCEDURE — 99214 OFFICE O/P EST MOD 30 MIN: CPT | Performed by: NURSE PRACTITIONER

## 2022-02-09 RX ORDER — QUETIAPINE FUMARATE 100 MG/1
100 TABLET, FILM COATED ORAL DAILY
COMMUNITY
Start: 2022-01-07

## 2022-02-09 RX ORDER — PANTOPRAZOLE SODIUM 40 MG/1
40 TABLET, DELAYED RELEASE ORAL DAILY
Qty: 30 TABLET | Refills: 0 | Status: SHIPPED | OUTPATIENT
Start: 2022-02-09 | End: 2022-02-28 | Stop reason: SDUPTHER

## 2022-02-09 ASSESSMENT — FIBROSIS 4 INDEX: FIB4 SCORE: 0.5

## 2022-02-09 NOTE — PROGRESS NOTES
Chief Complaint   Patient presents with   • Gastrophageal Reflux     starting to get worse, not able to eat or sleep, nausea vomiting, headaches       HISTORY OF PRESENT ILLNESS: Patient is a pleasant 26 y.o. male who presents to urgent care today with complaints of GERD.  Patient notes chronic history of GERD.  Reports return of symptoms over the past week.  Pain is epigastric, exacerbated with eating, dull.  Admits to associated nausea.  Denies any bloody stools, black stools, vomiting, fever.  The past, OTC omeprazole has been helpful.  He has started taking this medication in the past week, 20 mg, without significant improvement.  He otherwise denies any abdominal history.    Patient Active Problem List    Diagnosis Date Noted   • Eczema 07/13/2021   • Cervical radiculopathy at C7 05/20/2021   • VSD (ventricular septal defect) 02/23/2021   • Vaccine counseling 07/08/2019   • Other insomnia 01/24/2019   • Anxiety 01/24/2019   • BOB (obstructive sleep apnea) 10/12/2018   • B12 deficiency 10/12/2018   • GERD (gastroesophageal reflux disease) 07/05/2011   • Intermittent asthma 10/14/2008       Allergies:Ibuprofen    Current Outpatient Medications Ordered in Epic   Medication Sig Dispense Refill   • QUEtiapine (SEROQUEL) 100 MG Tab      • pantoprazole (PROTONIX) 40 MG Tablet Delayed Response Take 1 Tablet by mouth every day. 30 Tablet 0   • LamoTRIgine 100 MG TABLET SR 24 HR Take 100 mg by mouth every day.     • triamcinolone acetonide (KENALOG) 0.1 % Cream Apply a thin layer to rash BID PRN. Do not use longer than 7 days straight. 60 g 1   • cetirizine (ZYRTEC) 10 MG Tab Take 10 mg by mouth every day.     • albuterol 108 (90 Base) MCG/ACT Aero Soln inhalation aerosol Inhale 2 Puffs every 6 hours as needed for Shortness of Breath. 8.5 g 3     No current [facility]-ordered facility-administered medications on file.       Past Medical History:   Diagnosis Date   • Asthma    • B12 deficiency 10/12/2018   • Eczema    • GERD  "(gastroesophageal reflux disease)    • BOB (obstructive sleep apnea) 10/12/2018       Social History     Tobacco Use   • Smoking status: Former Smoker   • Smokeless tobacco: Never Used   Vaping Use   • Vaping Use: Never used   Substance Use Topics   • Alcohol use: Not Currently   • Drug use: Yes     Types: Marijuana, Inhaled     Comment: THC       Family Status   Relation Name Status   • Mo  Alive   • Fa  Alive   History reviewed. No pertinent family history.    ROS:  Review of Systems   Constitutional: Negative for fever, chills, weight loss, malaise, and fatigue.   HENT: Negative for ear pain, nosebleeds, congestion, sore throat and neck pain.    Eyes: Negative for vision changes.   Neuro: Negative for headache, sensory changes, weakness, seizure, LOC.   Cardiovascular: Negative for chest pain, palpitations, orthopnea and leg swelling.   Respiratory: Negative for cough, sputum production, shortness of breath and wheezing.   Gastrointestinal: Positive for abdominal pain, nausea.   Negative for vomiting or diarrhea.   Genitourinary: Negative for dysuria, urgency and frequency.  Musculoskeletal: Negative for falls, neck pain, back pain, joint pain, myalgias.   Skin: Negative for rash, diaphoresis.     Exam:  /90   Pulse (!) 106   Temp 36.6 °C (97.8 °F) (Temporal)   Resp 16   Ht 1.803 m (5' 11\")   Wt 79.4 kg (175 lb)   SpO2 96%   General: well-nourished, well-developed male in NAD  Head: normocephalic, atraumatic  Eyes: PERRLA, no conjunctival injection, acuity grossly intact, lids normal.  Ears: normal shape and symmetry, no tenderness, no discharge. External canals are without any significant edema or erythema. Tympanic membranes are without any inflammation, no effusion. Gross auditory acuity is intact.  Nose: symmetrical without tenderness, no discharge.  Mouth/Throat: reasonable hygiene, no erythema, exudates or tonsillar enlargement.  Neck: no masses, range of motion within normal limits, no tracheal " deviation. No obvious thyroid enlargement.   Lymph: no cervical adenopathy. No supraclavicular adenopathy.   Neuro: alert and oriented. Cranial nerves 1-12 grossly intact. No sensory deficit.   Cardiovascular: regular rate and rhythm. No edema.  Pulmonary: no distress. Chest is symmetrical with respiration, no wheezes, crackles, or rhonchi.   Abdomen: soft, epigastric tenderness present, no guarding, no hepatosplenomegaly.  Musculoskeletal: no clubbing, appropriate muscle tone, gait is stable.  Skin: warm, dry, intact, no clubbing, no cyanosis, no rashes.   Psych: appropriate mood, affect, judgement.         Assessment/Plan:  1. Gastroesophageal reflux disease, unspecified whether esophagitis present  pantoprazole (PROTONIX) 40 MG Tablet Delayed Response    Referral to Gastroenterology         Patient is a 26-year-old male with a history of chronic GERD who presents with acute exacerbation.  Patient given GI cocktail in clinic with improvement of symptoms.  Patient has been instructed to DC omeprazole, Protonix prescription provided.  Diet considerations discussed. GI referral placed.   Supportive care, differential diagnoses, and indications for immediate follow-up discussed with patient.   Pathogenesis of diagnosis discussed including typical length and natural progression.   Instructed to return to clinic or nearest emergency department for any change in condition, further concerns, or worsening of symptoms.  Patient states understanding of the plan of care and discharge instructions.  Instructed to make an appointment, for follow up, with his primary care provider.        Please note that this dictation was created using voice recognition software. I have made every reasonable attempt to correct obvious errors, but I expect that there are errors of grammar and possibly content that I did not discover before finalizing the note.      KANDY Alvarez.

## 2022-02-15 ENCOUNTER — OFFICE VISIT (OUTPATIENT)
Dept: MEDICAL GROUP | Facility: MEDICAL CENTER | Age: 27
End: 2022-02-15
Payer: COMMERCIAL

## 2022-02-15 VITALS
TEMPERATURE: 98.5 F | WEIGHT: 175 LBS | DIASTOLIC BLOOD PRESSURE: 80 MMHG | HEIGHT: 71 IN | SYSTOLIC BLOOD PRESSURE: 122 MMHG | BODY MASS INDEX: 24.5 KG/M2 | OXYGEN SATURATION: 98 % | HEART RATE: 95 BPM

## 2022-02-15 DIAGNOSIS — R10.13 EPIGASTRIC ABDOMINAL PAIN: ICD-10-CM

## 2022-02-15 DIAGNOSIS — K21.9 GASTROESOPHAGEAL REFLUX DISEASE, UNSPECIFIED WHETHER ESOPHAGITIS PRESENT: ICD-10-CM

## 2022-02-15 PROCEDURE — 99213 OFFICE O/P EST LOW 20 MIN: CPT | Performed by: FAMILY MEDICINE

## 2022-02-15 RX ORDER — GUANFACINE 2 MG/1
2 TABLET ORAL DAILY
COMMUNITY

## 2022-02-15 ASSESSMENT — FIBROSIS 4 INDEX: FIB4 SCORE: 0.5

## 2022-02-15 ASSESSMENT — PATIENT HEALTH QUESTIONNAIRE - PHQ9: CLINICAL INTERPRETATION OF PHQ2 SCORE: 0

## 2022-02-15 NOTE — ASSESSMENT & PLAN NOTE
Patient reports a longstanding history of reflux.  On February 9 he went to the urgent care with worsening epigastric abdominal pain exacerbated by eating associated with nausea.  He was prescribed pantoprazole.  Today,he continues to describe postprandial epigastric pain but states that his symptoms are about 50% better on the pantoprazole.  He also endorses some slight shortness of breath but denies significant chest pain.

## 2022-02-15 NOTE — PROGRESS NOTES
"[facility]  Progress Note  Established Patient    Subjective:   Santo Hernandez is a 26 y.o. male here today with a chief complaint of epigastric pain. The patient is alone.     GERD (gastroesophageal reflux disease)  Patient reports a longstanding history of reflux.  On February 9 he went to the urgent care with worsening epigastric abdominal pain exacerbated by eating associated with nausea.  He was prescribed pantoprazole.  Today,he continues to describe postprandial epigastric pain but states that his symptoms are about 50% better on the pantoprazole.  He also endorses some slight shortness of breath but denies significant chest pain.      Current Outpatient Medications on File Prior to Visit   Medication Sig Dispense Refill   • guanFACINE (TENEX) 1 MG Tab Take 1 mg by mouth every day.     • QUEtiapine (SEROQUEL) 100 MG Tab      • pantoprazole (PROTONIX) 40 MG Tablet Delayed Response Take 1 Tablet by mouth every day. 30 Tablet 0   • LamoTRIgine 100 MG TABLET SR 24 HR Take 100 mg by mouth every day.     • triamcinolone acetonide (KENALOG) 0.1 % Cream Apply a thin layer to rash BID PRN. Do not use longer than 7 days straight. 60 g 1   • cetirizine (ZYRTEC) 10 MG Tab Take 10 mg by mouth every day.     • albuterol 108 (90 Base) MCG/ACT Aero Soln inhalation aerosol Inhale 2 Puffs every 6 hours as needed for Shortness of Breath. 8.5 g 3     No current facility-administered medications on file prior to visit.          Objective:     Vitals:    02/15/22 0831   BP: 122/80   BP Location: Left arm   Patient Position: Sitting   BP Cuff Size: Adult long   Pulse: 95   Temp: 36.9 °C (98.5 °F)   TempSrc: Temporal   SpO2: 98%   Weight: 79.4 kg (175 lb)   Height: 1.803 m (5' 11\")       Physical Exam:  General: alert in no apparent distress.   Cardio: RRR no m/r/g.   Resp: CTAB.   GI: Soft, epigastric tenderness.  Negative Merritt sign.  No rebound, no guarding.  No tenderness elsewhere.        Assessment and Plan:     1. " Gastroesophageal reflux disease, unspecified whether esophagitis present  The patient's symptoms are consistent with reflux.  He may have an ulcer.  Differential diagnosis also includes pancreatitis or cholelithiasis.  We will rule out with the work-up below. The patient will continue PPI.  I offered Carafate but he would like to hold off for now.  I will follow-up in 1 week.  He has already been referred to GI.  Considering his history, I think he may require EGD.    2. Epigastric abdominal pain  - see above.   - Comp Metabolic Panel; Future  - LIPASE; Future  - US-RUQ; Future        Followup: Return in about 1 week (around 2/22/2022), or if symptoms worsen or fail to improve.

## 2022-02-16 ENCOUNTER — HOSPITAL ENCOUNTER (OUTPATIENT)
Dept: LAB | Facility: MEDICAL CENTER | Age: 27
End: 2022-02-16
Attending: FAMILY MEDICINE
Payer: COMMERCIAL

## 2022-02-16 ENCOUNTER — HOSPITAL ENCOUNTER (OUTPATIENT)
Dept: RADIOLOGY | Facility: MEDICAL CENTER | Age: 27
End: 2022-02-16
Attending: FAMILY MEDICINE
Payer: COMMERCIAL

## 2022-02-16 DIAGNOSIS — R10.13 EPIGASTRIC ABDOMINAL PAIN: ICD-10-CM

## 2022-02-16 LAB
ALBUMIN SERPL BCP-MCNC: 5 G/DL (ref 3.2–4.9)
ALBUMIN/GLOB SERPL: 1.9 G/DL
ALP SERPL-CCNC: 80 U/L (ref 30–99)
ALT SERPL-CCNC: 17 U/L (ref 2–50)
ANION GAP SERPL CALC-SCNC: 23 MMOL/L (ref 7–16)
AST SERPL-CCNC: 21 U/L (ref 12–45)
BILIRUB SERPL-MCNC: 0.7 MG/DL (ref 0.1–1.5)
BUN SERPL-MCNC: 10 MG/DL (ref 8–22)
CALCIUM SERPL-MCNC: 10.4 MG/DL (ref 8.5–10.5)
CHLORIDE SERPL-SCNC: 97 MMOL/L (ref 96–112)
CO2 SERPL-SCNC: 19 MMOL/L (ref 20–33)
CREAT SERPL-MCNC: 1.12 MG/DL (ref 0.5–1.4)
GLOBULIN SER CALC-MCNC: 2.7 G/DL (ref 1.9–3.5)
GLUCOSE SERPL-MCNC: 63 MG/DL (ref 65–99)
LIPASE SERPL-CCNC: 20 U/L (ref 11–82)
POTASSIUM SERPL-SCNC: 4.2 MMOL/L (ref 3.6–5.5)
PROT SERPL-MCNC: 7.7 G/DL (ref 6–8.2)
SODIUM SERPL-SCNC: 139 MMOL/L (ref 135–145)

## 2022-02-16 PROCEDURE — 36415 COLL VENOUS BLD VENIPUNCTURE: CPT

## 2022-02-16 PROCEDURE — 76705 ECHO EXAM OF ABDOMEN: CPT

## 2022-02-16 PROCEDURE — 83690 ASSAY OF LIPASE: CPT

## 2022-02-16 PROCEDURE — 80053 COMPREHEN METABOLIC PANEL: CPT

## 2022-02-28 ENCOUNTER — TELEMEDICINE (OUTPATIENT)
Dept: MEDICAL GROUP | Facility: MEDICAL CENTER | Age: 27
End: 2022-02-28
Payer: COMMERCIAL

## 2022-02-28 VITALS
SYSTOLIC BLOOD PRESSURE: 100 MMHG | HEIGHT: 71 IN | DIASTOLIC BLOOD PRESSURE: 80 MMHG | WEIGHT: 160 LBS | HEART RATE: 88 BPM | BODY MASS INDEX: 22.4 KG/M2

## 2022-02-28 DIAGNOSIS — E16.2 HYPOGLYCEMIA: ICD-10-CM

## 2022-02-28 DIAGNOSIS — K21.9 GASTROESOPHAGEAL REFLUX DISEASE, UNSPECIFIED WHETHER ESOPHAGITIS PRESENT: ICD-10-CM

## 2022-02-28 PROBLEM — R10.13 EPIGASTRIC ABDOMINAL PAIN: Status: RESOLVED | Noted: 2022-02-15 | Resolved: 2022-02-28

## 2022-02-28 PROCEDURE — 99214 OFFICE O/P EST MOD 30 MIN: CPT | Mod: 95 | Performed by: FAMILY MEDICINE

## 2022-02-28 RX ORDER — PANTOPRAZOLE SODIUM 40 MG/1
40 TABLET, DELAYED RELEASE ORAL DAILY
Qty: 90 TABLET | Refills: 0 | Status: ON HOLD | OUTPATIENT
Start: 2022-02-28 | End: 2022-03-24 | Stop reason: SDUPTHER

## 2022-02-28 ASSESSMENT — FIBROSIS 4 INDEX: FIB4 SCORE: 0.53

## 2022-02-28 NOTE — ASSESSMENT & PLAN NOTE
At the last visit the patient described worsening epigastric abdominal pain exacerbated by eating associated with nausea.  He was prescribed pantoprazole and his symptoms continue to improved. He is scheduled to see GI soon.

## 2022-02-28 NOTE — PROGRESS NOTES
Virtual Visit: Established Patient   This visit was conducted via Zoom using secure and encrypted videoconferencing technology.   The patient was in their home in the state of [place].    The patient's identity was confirmed and verbal consent was obtained for this virtual visit.    Subjective:   CC:   Chief Complaint   Patient presents with   • Follow-Up     GERD: Improving a little bit   • Other     Refill on rx Pantoprazole 40mg     Santo Hernandez is a 26 y.o. male presenting for evaluation and management of:    GERD (gastroesophageal reflux disease)  At the last visit the patient described worsening epigastric abdominal pain exacerbated by eating associated with nausea.  He was prescribed pantoprazole and his symptoms continue to improved. He is scheduled to see GI soon.     Hypoglycemia  Incidentally noted on labs.       Current medicines (including changes today)  Current Outpatient Medications   Medication Sig Dispense Refill   • pantoprazole (PROTONIX) 40 MG Tablet Delayed Response Take 1 Tablet by mouth every day. 90 Tablet 0   • guanFACINE (TENEX) 1 MG Tab Take 2 mg by mouth every day.     • QUEtiapine (SEROQUEL) 100 MG Tab      • LamoTRIgine 100 MG TABLET SR 24 HR Take 100 mg by mouth every day.     • triamcinolone acetonide (KENALOG) 0.1 % Cream Apply a thin layer to rash BID PRN. Do not use longer than 7 days straight. 60 g 1   • cetirizine (ZYRTEC) 10 MG Tab Take 10 mg by mouth every day.     • albuterol 108 (90 Base) MCG/ACT Aero Soln inhalation aerosol Inhale 2 Puffs every 6 hours as needed for Shortness of Breath. 8.5 g 3     No current facility-administered medications for this visit.       Patient Active Problem List    Diagnosis Date Noted   • Hypoglycemia 02/28/2022   • Eczema 07/13/2021   • Cervical radiculopathy at C7 05/20/2021   • VSD (ventricular septal defect) 02/23/2021   • Vaccine counseling 07/08/2019   • Other insomnia 01/24/2019   • Anxiety 01/24/2019   • BOB (obstructive sleep  "apnea) 10/12/2018   • B12 deficiency 10/12/2018   • GERD (gastroesophageal reflux disease) 07/05/2011   • Intermittent asthma 10/14/2008        Objective:   /80 (BP Location: Left arm, Patient Position: Sitting, BP Cuff Size: Adult) Comment: Pt reported  Pulse 88 Comment: pt reported  Ht 1.803 m (5' 11\") Comment: Pt reported  Wt 72.6 kg (160 lb) Comment: Pt reported  BMI 22.32 kg/m²     Physical Exam:  Constitutional: Alert, no distress, well-groomed.    Assessment and Plan:   The following treatment plan was discussed:     1. Gastroesophageal reflux disease, unspecified whether esophagitis present  - continue protonix.  - f/u GI.   - patient will discuss US findings with GI.   - pantoprazole (PROTONIX) 40 MG Tablet Delayed Response; Take 1 Tablet by mouth every day.  Dispense: 90 Tablet; Refill: 0    2. Hypoglycemia  - advised 5 meals per day.  - Blood Glucose; Future    Follow-up: Return in about 6 weeks (around 4/11/2022), or if symptoms worsen or fail to improve.         "

## 2022-03-23 ENCOUNTER — HOSPITAL ENCOUNTER (OUTPATIENT)
Facility: MEDICAL CENTER | Age: 27
End: 2022-03-24
Attending: EMERGENCY MEDICINE | Admitting: HOSPITALIST
Payer: COMMERCIAL

## 2022-03-23 ENCOUNTER — APPOINTMENT (OUTPATIENT)
Dept: RADIOLOGY | Facility: MEDICAL CENTER | Age: 27
End: 2022-03-23
Attending: HOSPITALIST
Payer: COMMERCIAL

## 2022-03-23 ENCOUNTER — ANESTHESIA EVENT (OUTPATIENT)
Dept: SURGERY | Facility: MEDICAL CENTER | Age: 27
End: 2022-03-23
Payer: COMMERCIAL

## 2022-03-23 DIAGNOSIS — K21.9 GASTROESOPHAGEAL REFLUX DISEASE, UNSPECIFIED WHETHER ESOPHAGITIS PRESENT: ICD-10-CM

## 2022-03-23 DIAGNOSIS — R13.19 ESOPHAGEAL DYSPHAGIA: ICD-10-CM

## 2022-03-23 DIAGNOSIS — R63.8 UNABLE TO EAT: ICD-10-CM

## 2022-03-23 PROBLEM — R13.10 ODYNOPHAGIA: Status: ACTIVE | Noted: 2022-03-23

## 2022-03-23 LAB
FLUAV RNA SPEC QL NAA+PROBE: NEGATIVE
FLUBV RNA SPEC QL NAA+PROBE: NEGATIVE
MAGNESIUM SERPL-MCNC: 2.1 MG/DL (ref 1.5–2.5)
SARS-COV-2 RNA RESP QL NAA+PROBE: NOTDETECTED
SPECIMEN SOURCE: NORMAL

## 2022-03-23 PROCEDURE — 99220 PR INITIAL OBSERVATION CARE,LEVL III: CPT | Performed by: HOSPITALIST

## 2022-03-23 PROCEDURE — 700102 HCHG RX REV CODE 250 W/ 637 OVERRIDE(OP): Performed by: EMERGENCY MEDICINE

## 2022-03-23 PROCEDURE — A9270 NON-COVERED ITEM OR SERVICE: HCPCS | Performed by: EMERGENCY MEDICINE

## 2022-03-23 PROCEDURE — 83735 ASSAY OF MAGNESIUM: CPT

## 2022-03-23 PROCEDURE — 0240U HCHG SARS-COV-2 COVID-19 NFCT DS RESP RNA 3 TRGT MIC: CPT

## 2022-03-23 PROCEDURE — 99285 EMERGENCY DEPT VISIT HI MDM: CPT

## 2022-03-23 PROCEDURE — G0378 HOSPITAL OBSERVATION PER HR: HCPCS

## 2022-03-23 PROCEDURE — 71045 X-RAY EXAM CHEST 1 VIEW: CPT

## 2022-03-23 PROCEDURE — C9803 HOPD COVID-19 SPEC COLLECT: HCPCS | Performed by: STUDENT IN AN ORGANIZED HEALTH CARE EDUCATION/TRAINING PROGRAM

## 2022-03-23 RX ORDER — SODIUM CHLORIDE 9 MG/ML
INJECTION, SOLUTION INTRAVENOUS CONTINUOUS
Status: DISCONTINUED | OUTPATIENT
Start: 2022-03-23 | End: 2022-03-24 | Stop reason: HOSPADM

## 2022-03-23 RX ORDER — ONDANSETRON 4 MG/1
4 TABLET, ORALLY DISINTEGRATING ORAL EVERY 4 HOURS PRN
Status: DISCONTINUED | OUTPATIENT
Start: 2022-03-23 | End: 2022-03-24 | Stop reason: HOSPADM

## 2022-03-23 RX ORDER — LAMOTRIGINE 100 MG/1
100 TABLET, EXTENDED RELEASE ORAL DAILY
Status: DISCONTINUED | OUTPATIENT
Start: 2022-03-23 | End: 2022-03-23

## 2022-03-23 RX ORDER — PROCHLORPERAZINE EDISYLATE 5 MG/ML
5-10 INJECTION INTRAMUSCULAR; INTRAVENOUS EVERY 4 HOURS PRN
Status: DISCONTINUED | OUTPATIENT
Start: 2022-03-23 | End: 2022-03-24 | Stop reason: HOSPADM

## 2022-03-23 RX ORDER — OMEPRAZOLE 20 MG/1
20 CAPSULE, DELAYED RELEASE ORAL ONCE
Status: COMPLETED | OUTPATIENT
Start: 2022-03-23 | End: 2022-03-23

## 2022-03-23 RX ORDER — OMEPRAZOLE 20 MG/1
20 CAPSULE, DELAYED RELEASE ORAL 2 TIMES DAILY
Status: DISCONTINUED | OUTPATIENT
Start: 2022-03-24 | End: 2022-03-24 | Stop reason: HOSPADM

## 2022-03-23 RX ORDER — PROMETHAZINE HYDROCHLORIDE 25 MG/1
12.5-25 TABLET ORAL EVERY 4 HOURS PRN
Status: DISCONTINUED | OUTPATIENT
Start: 2022-03-23 | End: 2022-03-24 | Stop reason: HOSPADM

## 2022-03-23 RX ORDER — ONDANSETRON 2 MG/ML
4 INJECTION INTRAMUSCULAR; INTRAVENOUS EVERY 4 HOURS PRN
Status: DISCONTINUED | OUTPATIENT
Start: 2022-03-23 | End: 2022-03-24 | Stop reason: HOSPADM

## 2022-03-23 RX ORDER — QUETIAPINE FUMARATE 100 MG/1
100 TABLET, FILM COATED ORAL DAILY
Status: DISCONTINUED | OUTPATIENT
Start: 2022-03-23 | End: 2022-03-24 | Stop reason: HOSPADM

## 2022-03-23 RX ORDER — PROMETHAZINE HYDROCHLORIDE 12.5 MG/1
12.5-25 SUPPOSITORY RECTAL EVERY 4 HOURS PRN
Status: DISCONTINUED | OUTPATIENT
Start: 2022-03-23 | End: 2022-03-24 | Stop reason: HOSPADM

## 2022-03-23 RX ORDER — LAMOTRIGINE 25 MG/1
50 TABLET ORAL 2 TIMES DAILY
Status: DISCONTINUED | OUTPATIENT
Start: 2022-03-24 | End: 2022-03-24 | Stop reason: HOSPADM

## 2022-03-23 RX ORDER — ALBUTEROL SULFATE 90 UG/1
2 AEROSOL, METERED RESPIRATORY (INHALATION)
Status: DISCONTINUED | OUTPATIENT
Start: 2022-03-23 | End: 2022-03-24 | Stop reason: HOSPADM

## 2022-03-23 RX ORDER — ACETAMINOPHEN 325 MG/1
650 TABLET ORAL EVERY 6 HOURS PRN
Status: DISCONTINUED | OUTPATIENT
Start: 2022-03-23 | End: 2022-03-24 | Stop reason: HOSPADM

## 2022-03-23 RX ADMIN — LIDOCAINE HYDROCHLORIDE 30 ML: 20 SOLUTION OROPHARYNGEAL at 15:36

## 2022-03-23 RX ADMIN — OMEPRAZOLE 20 MG: 20 CAPSULE, DELAYED RELEASE ORAL at 15:51

## 2022-03-23 ASSESSMENT — ENCOUNTER SYMPTOMS
VOMITING: 0
NAUSEA: 0
SHORTNESS OF BREATH: 1
WHEEZING: 0
MYALGIAS: 0
COUGH: 1
HEMOPTYSIS: 0
BRUISES/BLEEDS EASILY: 0
HEADACHES: 0
DEPRESSION: 0
TINGLING: 0
SORE THROAT: 1
DOUBLE VISION: 0
PND: 0
SINUS PAIN: 0
BLURRED VISION: 0
CLAUDICATION: 0
HEARTBURN: 0
STRIDOR: 0
DIZZINESS: 0
BACK PAIN: 0
PALPITATIONS: 0
FEVER: 0
CHILLS: 0

## 2022-03-23 ASSESSMENT — LIFESTYLE VARIABLES: EVER_SMOKED: YES

## 2022-03-23 ASSESSMENT — COPD QUESTIONNAIRES
COPD SCREENING SCORE: 5
DURING THE PAST 4 WEEKS HOW MUCH DID YOU FEEL SHORT OF BREATH: SOME OF THE TIME
DO YOU EVER COUGH UP ANY MUCUS OR PHLEGM?: YES, EVERY DAY
HAVE YOU SMOKED AT LEAST 100 CIGARETTES IN YOUR ENTIRE LIFE: YES

## 2022-03-23 ASSESSMENT — FIBROSIS 4 INDEX: FIB4 SCORE: 0.53

## 2022-03-23 ASSESSMENT — PAIN DESCRIPTION - PAIN TYPE
TYPE: ACUTE PAIN
TYPE: ACUTE PAIN

## 2022-03-23 NOTE — H&P
"Hospital Medicine History & Physical Note    Date of Service  3/23/2022    Primary Care Physician  Clayton Ferrell M.D.    [facility]    Specialist Names: Dr Go     Code Status  Full Code    Chief Complaint  Chief Complaint   Patient presents with   • Difficulty Swallowing     x2 weeks and worse x2 days; pt states that he almost choked on food and medication this AM and pt was worried he was going to aspirate   • Difficulty Breathing     x2 weeks and worse x2 days; pt states that he has used his asthma inhaler multiple times today but states that his \"airway and throat just feel so constricted\"       History of Presenting Illness  Santo Hernandez is a 26 y.o. male who presented 3/23/2022 with past medical history of anxiety, is coming today complaining of several weeks of difficulty swallowing that gradually has gotten worse over the last 2 weeks, patient stated that food is getting stuck in his esophagus causing cough and sometimes he has to regurgitate food that he just ate, patient complaining of off-and-on shortness of breath, along with cough and choking sensation when he is eating, patient denies any fever chills no abdominal pain, patient has had severe GERD pretty much all his life, denies any focal weakness numbness tingling vision changes headache, patient was scheduled for endoscopy as outpatient next month but because his symptoms got worse he decided to come to the emergency room where he was evaluated by GI doctor and planning on EGD tomorrow, patient is going to remain on clear liquid diet, IV fluids, we are checking labs and chest x-ray, patient will be n.p.o. at midnight, I have discussed plan of care with patient, patient's family, ERP, all question have been answered.        Review of Systems  Review of Systems   Constitutional: Negative for chills and fever.   HENT: Positive for sore throat. Negative for congestion and sinus pain.         Difficulty swallowing.    Eyes: Negative " for blurred vision and double vision.   Respiratory: Positive for cough and shortness of breath. Negative for hemoptysis, wheezing and stridor.    Cardiovascular: Negative for chest pain, palpitations, claudication, leg swelling and PND.   Gastrointestinal: Negative for heartburn, nausea and vomiting.   Genitourinary: Negative for hematuria and urgency.   Musculoskeletal: Negative for back pain and myalgias.   Skin: Negative for rash.   Neurological: Negative for dizziness, tingling and headaches.   Endo/Heme/Allergies: Does not bruise/bleed easily.   Psychiatric/Behavioral: Negative for depression.       Past Medical History   has a past medical history of Asthma, B12 deficiency (10/12/2018), Eczema, GERD (gastroesophageal reflux disease), and BOB (obstructive sleep apnea) (10/12/2018).    Surgical History   has a past surgical history that includes pr inj cerv/thorac,w/ imaging (8/16/2021).     Family History  Colon cancer in grandfather  Father is prediabetic and has history of prostate cancer.    Social History   reports that he has quit smoking. He has never used smokeless tobacco. He reports previous alcohol use. He reports current drug use. Drugs: Marijuana and Inhaled.    Allergies  Allergies   Allergen Reactions   • Ibuprofen Swelling       Medications  Prior to Admission Medications   Prescriptions Last Dose Informant Patient Reported? Taking?   LamoTRIgine 100 MG TABLET SR 24 HR   Yes No   Sig: Take 100 mg by mouth every day.   QUEtiapine (SEROQUEL) 100 MG Tab   Yes No   albuterol 108 (90 Base) MCG/ACT Aero Soln inhalation aerosol   No No   Sig: Inhale 2 Puffs every 6 hours as needed for Shortness of Breath.   cetirizine (ZYRTEC) 10 MG Tab   Yes No   Sig: Take 10 mg by mouth every day.   guanFACINE (TENEX) 1 MG Tab   Yes No   Sig: Take 2 mg by mouth every day.   pantoprazole (PROTONIX) 40 MG Tablet Delayed Response   No No   Sig: Take 1 Tablet by mouth every day.   triamcinolone acetonide (KENALOG) 0.1 %  Cream   No No   Sig: Apply a thin layer to rash BID PRN. Do not use longer than 7 days straight.      Facility-Administered Medications: None       Physical Exam  Temp:  [37.4 °C (99.3 °F)] 37.4 °C (99.3 °F)  Pulse:  [109] 109  Resp:  [18] 18  BP: (125)/(77) 125/77  SpO2:  [99 %] 99 %  Blood Pressure: 125/77   Temperature: 37.4 °C (99.3 °F)   Pulse: (!) 109   Respiration: 18   Pulse Oximetry: 99 %       Physical Exam  Vitals and nursing note reviewed.   Constitutional:       General: He is not in acute distress.     Appearance: Normal appearance. He is not ill-appearing.   HENT:      Head: Normocephalic.      Mouth/Throat:      Mouth: Mucous membranes are dry.      Pharynx: Posterior oropharyngeal erythema present.   Eyes:      General: No scleral icterus.        Right eye: No discharge.         Left eye: No discharge.      Conjunctiva/sclera: Conjunctivae normal.   Cardiovascular:      Rate and Rhythm: Normal rate and regular rhythm.      Pulses: Normal pulses.      Heart sounds: Normal heart sounds.   Pulmonary:      Effort: Pulmonary effort is normal. No respiratory distress.      Breath sounds: Normal breath sounds. No wheezing.   Abdominal:      General: Bowel sounds are normal. There is no distension.      Palpations: Abdomen is soft.      Tenderness: There is no abdominal tenderness. There is no guarding.   Musculoskeletal:         General: Normal range of motion.      Cervical back: Normal range of motion and neck supple.      Right lower leg: No edema.      Left lower leg: No edema.   Skin:     General: Skin is warm and dry.      Capillary Refill: Capillary refill takes less than 2 seconds.      Coloration: Skin is not jaundiced.   Neurological:      Mental Status: He is alert and oriented to person, place, and time.      Cranial Nerves: No cranial nerve deficit.      Motor: No weakness.   Psychiatric:         Mood and Affect: Mood normal.         Laboratory:          No results for input(s): ALTSGPT,  ASTSGOT, ALKPHOSPHAT, TBILIRUBIN, DBILIRUBIN, GAMMAGT, AMYLASE, LIPASE, ALB, PREALBUMIN, GLUCOSE in the last 72 hours.      No results for input(s): NTPROBNP in the last 72 hours.      No results for input(s): TROPONINT in the last 72 hours.    Imaging:  DX-CHEST-PORTABLE (1 VIEW)    (Results Pending)       Chest x-ray pending    Assessment/Plan:  I anticipate this patient is appropriate for observation status at this time.    * Odynophagia- (present on admission)  Assessment & Plan  Likely due to severe GERD  Gi consulted EGD in am  CLD for now NPO at [facility].  Iv fluids  PPI.     Anxiety- (present on admission)  Assessment & Plan  monitor    Intermittent asthma- (present on admission)  Assessment & Plan  Not on acute exacerbation  RT per protocol.     GERD (gastroesophageal reflux disease)- (present on admission)  Assessment & Plan  Started on ppi bid  Will probably require sucralfate after EGD tomorrow.  GI consulted.         VTE prophylaxis: SCDs/TEDs

## 2022-03-23 NOTE — ED TRIAGE NOTES
"Chief Complaint   Patient presents with   • Difficulty Swallowing     x2 weeks and worse x2 days; pt states that he almost choked on food and medication this AM and pt was worried he was going to aspirate   • Difficulty Breathing     x2 weeks and worse x2 days; pt states that he has used his asthma inhaler multiple times today but states that his \"airway and throat just feel so constricted\"     Pt ambulatory to triage with dad for above complaint. Pt has seen GI and has appt for endoscopy on 4/5 but was told to come in if he felt that he was having more difficulty swallowing or breathing. Pt is managing his oral secretions without difficulty in triage and airway is intact.     Pt is alert/oriented and follows commands. Pt speaking in full sentences and responds appropriately to questions. No acute distress noted in triage and respirations are even and unlabored.     Pt placed in lobby and educated on triage process. Pt and dad encouraged to alert staff for any changes in condition.  "

## 2022-03-23 NOTE — ED PROVIDER NOTES
"ED Provider Note    Scribed for Lien An M.D. by Blayne Go. 3/23/2022, 3:18 PM.    Primary care provider: Clayton Ferrell M.D.  Means of arrival: Walk in  History obtained from: Patient  History limited by: None    CHIEF COMPLAINT  Chief Complaint   Patient presents with    Difficulty Swallowing     x2 weeks and worse x2 days; pt states that he almost choked on food and medication this AM and pt was worried he was going to aspirate    Difficulty Breathing     x2 weeks and worse x2 days; pt states that he has used his asthma inhaler multiple times today but states that his \"airway and throat just feel so constricted\"     HPI  Santo Hernandez is a 26 y.o. male who presents to the Emergency Department for evaluation of worsening difficulty swallowing onset about 2 weeks ago. He reports he was taking a medication this morning, when he almost choked. He says he was recently evaluated by Margot PEREZ ([facility]), who believes his symptoms are related to his GERD. He is scheduled for further evaluation with endoscopy on 4/5/22. He tried calling [facility] today regarding his symptoms, but he received no answer. He has taken omeprazole in the past, but is on pantoprazole now. He says he has been eating small meals. He admits to associated symptoms of reflux, difficulty breathing secondary to reflux, and abdominal distention, but denies hematemesis, drooling, or fever. He has used his albuterol inhaler recently due to a feeling of difficulty breathing.  He feels like his airway and his throat is constricted.  He had a very difficult time even swallowing his pills this morning.  He feels like he is no longer able to eat.    REVIEW OF SYSTEMS  Pertinent positives include difficulty swallowing, reflux, difficulty breathing secondary to reflux and abdominal distention.   Pertinent negatives include no hematemesis, drooling, or fever.   All other systems reviewed and negative.     PAST MEDICAL " "HISTORY   has a past medical history of Asthma, B12 deficiency (10/12/2018), Eczema, GERD (gastroesophageal reflux disease), and BOB (obstructive sleep apnea) (10/12/2018).    SURGICAL HISTORY   has a past surgical history that includes inj cerv/thorac,w/ imaging (8/16/2021).    SOCIAL HISTORY  Social History     Tobacco Use    Smoking status: Former Smoker    Smokeless tobacco: Never Used   Vaping Use    Vaping Use: Former   Substance Use Topics    Alcohol use: Not Currently    Drug use: Yes     Types: Marijuana, Inhaled     Comment: marijuana      Social History     Substance and Sexual Activity   Drug Use Yes    Types: Marijuana, Inhaled    Comment: marijuana       FAMILY HISTORY  History reviewed. No pertinent family history.    CURRENT MEDICATIONS  Home Medications       Reviewed by Sonia Ferguson R.N. (Registered Nurse) on 03/23/22 at 1334  Med List Status: <None>     Medication Last Dose Status   albuterol 108 (90 Base) MCG/ACT Aero Soln inhalation aerosol  Active   cetirizine (ZYRTEC) 10 MG Tab  Active   guanFACINE (TENEX) 1 MG Tab  Active   LamoTRIgine 100 MG TABLET SR 24 HR  Active   pantoprazole (PROTONIX) 40 MG Tablet Delayed Response  Active   QUEtiapine (SEROQUEL) 100 MG Tab  Active   triamcinolone acetonide (KENALOG) 0.1 % Cream  Active                    ALLERGIES  Allergies   Allergen Reactions    Ibuprofen Swelling       PHYSICAL EXAM  VITAL SIGNS: /77   Pulse (!) 109   Temp 37.4 °C (99.3 °F) (Temporal)   Resp 18   Ht 1.803 m (5' 11\")   Wt 75.9 kg (167 lb 5.3 oz)   SpO2 99%   BMI 23.34 kg/m²     Constitutional: laying flat on the bed,  Well developed, No acute distress, Non-toxic appearance.   HENT: Normocephalic, Atraumatic, Bilateral external ears normal,  Nose normal. Normal voice.   Eyes: PERRL, EOMI, Conjunctiva normal.    Neck: Normal range of motion, No tenderness, Supple. No stridor.    Cardiovascular: tachycardic heart rate, Normal rhythm.    Thorax & Lungs: Normal breath " sounds, No respiratory distress.    Abdomen: Benign abdominal exam, no tenderness, no distention, no guarding, no rebound.    Skin: Warm, Dry, No erythema, No rash.   Back: No tenderness, No CVA tenderness.   Extremities: Intact distal pulses, No edema, No tenderness   Neurologic: Alert & oriented x 3, Normal motor function, Normal sensory function, No focal deficits noted.   Psychiatric: Appropriate                                                     COURSE & MEDICAL DECISION MAKING  Nursing notes, VS, PMSFHx reviewed in chart.     3:18 PM Patient seen and examined at bedside. The patient presents with difficulty swallowing and the differential diagnosis includes but is not limited to Esophageal Stricture vs GERD.  There is no stridor or evidence of respiratory distress on exam.  I do not hear any wheezing on exam.  I think his symptoms are related to a esophageal issue I am not concerned about a swelling or issue with his trachea or throat.  Patient was treated with GI Cocktail 30 mL oral suspension for his symptoms.    3:28 PM Paged [facility].     3:31 PM I discussed the patient's case and the above findings with Dr. Snider (GI) who recommended increasing his Protonix to 40 mg BID. He advised giving a PO challenge and crackers, and if keep this down he can follow up in clinic. If he does not tolerate this, he requested I call him back.     3:43 PM - Pharmacy has no PO Protonix, so he will be medicated with Prilosec 20 mg capsule.     3:43 PM - Dr. Snider (GI) is evaluating the patient at bedside.     3:49 PM - I spoke with Dr. Snider, who recommends admission to the [facility]. He will scope the patient tomorrow.     3:53 PM - Paged [facility].     4:05 PM I discussed the patient's case and the above findings with Dr. Marquez ([facility]) who will assess the patient for hospitalization.      DISPOSITION:  Patient will be hospitalized by Dr. Marquez in guarded condition.      FINAL  IMPRESSION  1. Esophageal dysphagia    2. Unable to eat          I, Blayne Go (Scribe), am scribing for, and in the presence of, Lien An M.D..    Electronically signed by: Blayne Go (Scribe), 3/23/2022    ILien M.D. personally performed the services described in this documentation, as scribed by Blayne Go in my presence, and it is both accurate and complete.    The note accurately reflects work and decisions made by me.  Lien An M.D.  3/23/2022  8:04 PM

## 2022-03-23 NOTE — CONSULTS
Gastroenterology Consult Note:    Alireza Snider M.D.  Date & Time note created:    3/23/2022   3:51 PM     Referring MD:  Dr. An    Patient ID:   Name:             Santo Hernandez   YOB: 1995  Age:                 26 y.o.  male   MRN:               [id number]                                                             Reason for Consult:      GERD and hard time swallowing    History of Present Illness:    18-year-old male with history of asthma, chronic GERD, obstructive sleep apnea in chart history of eczema who presents with difficulty swallowing and acid reflux.  Father supplements history.  I also reviewed outpatient notes from early March from one of our VAISHALI is in clinic.  For years since childhood the patient has had acid reflux symptoms with retrograde flow from the stomach into the throat.  Roughly 3 weeks ago when he saw our VAISHALI service in clinic he was placed on empiric PPI therapy once a day which he said he helped up until a few days ago no longer began to help.  He states whenever he eats he feels like there is flow of liquid and solid food back into his throat which he has to swallow back down.  He denies vomiting.  He has intermittent nausea may be 2-3 times per week he has a chart history of marijuana use.  Hot showers do not improve his symptoms status.  Denies abdominal pain, melena, diarrhea, rectal bleeding.  He denies odynophagia.  He does feel that his symptoms interfere with his breathing especially at night.  He feels like his CPAP machine fills the stomach with gas.  Denies NSAID use.  He thinks he is lost about 15 pounds over the last several months.  He is concerned he cannot tolerate his symptoms until his April 5 outpatient endoscopy.    Review of Systems:      Constitutional: Denies fevers, weight loss as per HPI  Eyes: Denies changes in vision, no eye pain  Ears/Nose/Throat/Mouth: Denies nasal congestion or sore throat   Cardiovascular: Denies chest pain or  palpitations.  Respiratory: Denies shortness of breath, cough, and wheezing.  Gastrointestinal/Hepatic: As per HPI  Genitourinary: Denies dysuria or frequency  Musculoskeletal/Rheum: Denies  joint pain and swelling, no edema  Skin: Denies rash  Neurological: Denies headache, confusion, memory loss or focal weakness/parasthesias  Psychiatric: denies mood disorder   Endocrine: Kae thyroid problems  Heme/Oncology/Lymph Nodes: Denies enlarged lymph nodes, denies brusing or known bleeding disorder  All other systems were reviewed and are negative (AMA/CMS criteria)                Past Medical History:   Past Medical History:   Diagnosis Date   • Asthma    • B12 deficiency 10/12/2018   • Eczema    • GERD (gastroesophageal reflux disease)    • BOB (obstructive sleep apnea) 10/12/2018    +cpap         Past Surgical History:  Past Surgical History:   Procedure Laterality Date   • [place] INJ CERV/THORAC,W/ IMAGING  8/16/2021    Procedure: C7-T1 interlaminar epidural steroid injection;  Surgeon: Schuyler Hussein M.D.;  [facility];  Service: Pain Management       Hospital Medications:  No current facility-administered medications for this encounter.    Current Outpatient Medications:   •  pantoprazole (PROTONIX) 40 MG Tablet Delayed Response, Take 1 Tablet by mouth every day., Disp: 90 Tablet, Rfl: 0  •  guanFACINE (TENEX) 1 MG Tab, Take 2 mg by mouth every day., Disp: , Rfl:   •  QUEtiapine (SEROQUEL) 100 MG Tab, , Disp: , Rfl:   •  LamoTRIgine 100 MG TABLET SR 24 HR, Take 100 mg by mouth every day., Disp: , Rfl:   •  triamcinolone acetonide (KENALOG) 0.1 % Cream, Apply a thin layer to rash BID PRN. Do not use longer than 7 days straight., Disp: 60 g, Rfl: 1  •  cetirizine (ZYRTEC) 10 MG Tab, Take 10 mg by mouth every day., Disp: , Rfl:   •  albuterol 108 (90 Base) MCG/ACT Aero Soln inhalation aerosol, Inhale 2 Puffs every 6 hours as needed for Shortness of Breath., Disp: 8.5 g, Rfl: 3    Current  Outpatient Medications:  No current facility-administered medications for this encounter.     Current Outpatient Medications   Medication Sig Dispense Refill   • pantoprazole (PROTONIX) 40 MG Tablet Delayed Response Take 1 Tablet by mouth every day. 90 Tablet 0   • guanFACINE (TENEX) 1 MG Tab Take 2 mg by mouth every day.     • QUEtiapine (SEROQUEL) 100 MG Tab      • LamoTRIgine 100 MG TABLET SR 24 HR Take 100 mg by mouth every day.     • triamcinolone acetonide (KENALOG) 0.1 % Cream Apply a thin layer to rash BID PRN. Do not use longer than 7 days straight. 60 g 1   • cetirizine (ZYRTEC) 10 MG Tab Take 10 mg by mouth every day.     • albuterol 108 (90 Base) MCG/ACT Aero Soln inhalation aerosol Inhale 2 Puffs every 6 hours as needed for Shortness of Breath. 8.5 g 3       Medication Allergy:  Allergies   Allergen Reactions   • Ibuprofen Swelling       Family History:  History reviewed. No pertinent family history.    Social History:  Social History     Socioeconomic History   • Marital status: Single     Spouse name: Not on file   • Number of children: Not on file   • Years of education: Not on file   • Highest education level: Not on file   Occupational History   • Not on file   Tobacco Use   • Smoking status: Former Smoker   • Smokeless tobacco: Never Used   Vaping Use   • Vaping Use: Former   Substance and Sexual Activity   • Alcohol use: Not Currently   • Drug use: Yes     Types: Marijuana, Inhaled     Comment: marijuana   • Sexual activity: Not on file   Other Topics Concern   •  Service No   • Blood Transfusions No   • Caffeine Concern No   • Occupational Exposure No   • Hobby Hazards No   • Sleep Concern No   • Stress Concern No   • Weight Concern No   • Special Diet No   • Back Care No   • Exercise Yes   • Bike Helmet Yes   • Seat Belt Yes   • Self-Exams No   Social History Narrative   • Not on file     Social Determinants of Health     Financial Resource Strain: Not on file   Food Insecurity: Not on  "file   Transportation Needs: Not on file   Physical Activity: Not on file   Stress: Not on file   Social Connections: Not on file   Intimate Partner Violence: Not on file   Housing Stability: Not on file         Physical Exam:  Vitals/ General Appearance:   Weight/BMI: Body mass index is 23.34 kg/m².  /77   Pulse (!) 109   Temp 37.4 °C (99.3 °F) (Temporal)   Resp 18   Ht 1.803 m (5' 11\")   Wt 75.9 kg (167 lb 5.3 oz)   SpO2 99%   Vitals:    03/23/22 1327 03/23/22 1332   BP: 125/77    Pulse: (!) 109    Resp: 18    Temp: 37.4 °C (99.3 °F)    TempSrc: Temporal    SpO2: 99%    Weight:  75.9 kg (167 lb 5.3 oz)   Height:  1.803 m (5' 11\")     Oxygen Therapy:  Pulse Oximetry: 99 %    Constitutional:   Well developed, Well nourished, No acute distress  HENMT:  Normocephalic, Atraumatic, Oropharynx moist mucous membranes, No oral exudates, Nose normal.  No thyromegaly.  Eyes: Conjunctiva normal, No discharge.  Neck:  Normal range of motion,  Cardiovascular:  Normal heart rate, Normal rhythm, No murmurs, No rubs, No gallops.   Extremitites with intact distal pulses, no cyanosis, or edema.  Lungs:  Normal breath sounds, breath sounds clear to auscultation bilaterally,  no crackles, no wheezing.   Abdomen: Bowel sounds normal, Soft, No tenderness, No guarding, No rebound, No masses  Skin: Warm, Dry, No erythema, No rash, no induration.  Neurologic: Alert & oriented x 3, No focal deficits noted,   Psychiatric: Affect normal, Judgment normal, Mood normal.      MDM (Data Review):     Records reviewed and summarized in current documentation    Lab Data Review:  No results found for this or any previous visit (from the past 24 hour(s)).    Imaging/Procedures Review:    None this encounter    MDM (Assessment and Plan):     Patient Active Problem List    Diagnosis Date Noted   • Hypoglycemia 02/28/2022   • Eczema 07/13/2021   • Cervical radiculopathy at C7 05/20/2021   • VSD (ventricular septal defect) 02/23/2021   • " Vaccine counseling 07/08/2019   • Other insomnia 01/24/2019   • Anxiety 01/24/2019   • BOB (obstructive sleep apnea) 10/12/2018   • B12 deficiency 10/12/2018   • GERD (gastroesophageal reflux disease) 07/05/2011   • Intermittent asthma 10/14/2008       #GERD  #Esophageal dysphagia  #Asthma  #Eczema: I share our VAISHALI's concern in clinic that the patient may have eosinophilic esophagitis potentially complicated by a stricture; he has classic risk factors include eczema, asthma, and  ethnicity.  Since his symptoms of progressing does not feel like he can tolerate them until April 5 we will proceed with an EGD and dilation tomorrow.  We will plan also biopsies.    -N.p.o. after midnight except for sips with meds  -Preendoscopy Covid testing  -May have clear liquid diet until midnight      My total time spent caring for the patient on the day of the encounter was 70 minutes.   This does not include time spent on separately billable procedures/tests.      Thank your for the opportunity to assist in the care of your patient.  Please call for any questions or concerns.    Alireza Snider M.D.

## 2022-03-23 NOTE — ED NOTES
Patient given prilosec (see mar) patient able to swallow it. States that the GI cocktail helped. Denies any further needs at this time.

## 2022-03-24 ENCOUNTER — ANESTHESIA (OUTPATIENT)
Dept: SURGERY | Facility: MEDICAL CENTER | Age: 27
End: 2022-03-24
Payer: COMMERCIAL

## 2022-03-24 VITALS
RESPIRATION RATE: 16 BRPM | BODY MASS INDEX: 23.43 KG/M2 | SYSTOLIC BLOOD PRESSURE: 123 MMHG | HEIGHT: 71 IN | TEMPERATURE: 97.5 F | WEIGHT: 167.33 LBS | DIASTOLIC BLOOD PRESSURE: 66 MMHG | HEART RATE: 73 BPM | OXYGEN SATURATION: 98 %

## 2022-03-24 LAB — PATHOLOGY CONSULT NOTE: NORMAL

## 2022-03-24 PROCEDURE — 700102 HCHG RX REV CODE 250 W/ 637 OVERRIDE(OP): Performed by: HOSPITALIST

## 2022-03-24 PROCEDURE — A9270 NON-COVERED ITEM OR SERVICE: HCPCS | Performed by: STUDENT IN AN ORGANIZED HEALTH CARE EDUCATION/TRAINING PROGRAM

## 2022-03-24 PROCEDURE — 700105 HCHG RX REV CODE 258: Performed by: STUDENT IN AN ORGANIZED HEALTH CARE EDUCATION/TRAINING PROGRAM

## 2022-03-24 PROCEDURE — 88312 SPECIAL STAINS GROUP 1: CPT

## 2022-03-24 PROCEDURE — C1726 CATH, BAL DIL, NON-VASCULAR: HCPCS | Performed by: STUDENT IN AN ORGANIZED HEALTH CARE EDUCATION/TRAINING PROGRAM

## 2022-03-24 PROCEDURE — 160002 HCHG RECOVERY MINUTES (STAT): Performed by: STUDENT IN AN ORGANIZED HEALTH CARE EDUCATION/TRAINING PROGRAM

## 2022-03-24 PROCEDURE — 88305 TISSUE EXAM BY PATHOLOGIST: CPT | Mod: 59

## 2022-03-24 PROCEDURE — 700111 HCHG RX REV CODE 636 W/ 250 OVERRIDE (IP): Performed by: STUDENT IN AN ORGANIZED HEALTH CARE EDUCATION/TRAINING PROGRAM

## 2022-03-24 PROCEDURE — 160035 HCHG PACU - 1ST 60 MINS PHASE I: Performed by: STUDENT IN AN ORGANIZED HEALTH CARE EDUCATION/TRAINING PROGRAM

## 2022-03-24 PROCEDURE — 160203 HCHG ENDO MINUTES - 1ST 30 MINS LEVEL 4: Performed by: STUDENT IN AN ORGANIZED HEALTH CARE EDUCATION/TRAINING PROGRAM

## 2022-03-24 PROCEDURE — 160048 HCHG OR STATISTICAL LEVEL 1-5: Performed by: STUDENT IN AN ORGANIZED HEALTH CARE EDUCATION/TRAINING PROGRAM

## 2022-03-24 PROCEDURE — 160036 HCHG PACU - EA ADDL 30 MINS PHASE I: Performed by: STUDENT IN AN ORGANIZED HEALTH CARE EDUCATION/TRAINING PROGRAM

## 2022-03-24 PROCEDURE — 160009 HCHG ANES TIME/MIN: Performed by: STUDENT IN AN ORGANIZED HEALTH CARE EDUCATION/TRAINING PROGRAM

## 2022-03-24 PROCEDURE — 700105 HCHG RX REV CODE 258: Performed by: HOSPITALIST

## 2022-03-24 PROCEDURE — 99217 PR OBSERVATION CARE DISCHARGE: CPT | Performed by: STUDENT IN AN ORGANIZED HEALTH CARE EDUCATION/TRAINING PROGRAM

## 2022-03-24 PROCEDURE — G0378 HOSPITAL OBSERVATION PER HR: HCPCS

## 2022-03-24 PROCEDURE — 700102 HCHG RX REV CODE 250 W/ 637 OVERRIDE(OP): Performed by: STUDENT IN AN ORGANIZED HEALTH CARE EDUCATION/TRAINING PROGRAM

## 2022-03-24 PROCEDURE — A9270 NON-COVERED ITEM OR SERVICE: HCPCS | Performed by: HOSPITALIST

## 2022-03-24 RX ORDER — SODIUM CHLORIDE, SODIUM LACTATE, POTASSIUM CHLORIDE, CALCIUM CHLORIDE 600; 310; 30; 20 MG/100ML; MG/100ML; MG/100ML; MG/100ML
INJECTION, SOLUTION INTRAVENOUS CONTINUOUS
Status: DISCONTINUED | OUTPATIENT
Start: 2022-03-24 | End: 2022-03-24 | Stop reason: HOSPADM

## 2022-03-24 RX ORDER — HYDRALAZINE HYDROCHLORIDE 20 MG/ML
5 INJECTION INTRAMUSCULAR; INTRAVENOUS
Status: DISCONTINUED | OUTPATIENT
Start: 2022-03-24 | End: 2022-03-24 | Stop reason: HOSPADM

## 2022-03-24 RX ORDER — OXYCODONE HCL 5 MG/5 ML
5 SOLUTION, ORAL ORAL
Status: DISCONTINUED | OUTPATIENT
Start: 2022-03-24 | End: 2022-03-24 | Stop reason: HOSPADM

## 2022-03-24 RX ORDER — PANTOPRAZOLE SODIUM 40 MG/1
40 TABLET, DELAYED RELEASE ORAL 2 TIMES DAILY
Qty: 60 TABLET | Refills: 0 | Status: SHIPPED | OUTPATIENT
Start: 2022-03-24 | End: 2022-05-03 | Stop reason: SDUPTHER

## 2022-03-24 RX ORDER — SODIUM CHLORIDE, SODIUM LACTATE, POTASSIUM CHLORIDE, CALCIUM CHLORIDE 600; 310; 30; 20 MG/100ML; MG/100ML; MG/100ML; MG/100ML
INJECTION, SOLUTION INTRAVENOUS
Status: DISCONTINUED | OUTPATIENT
Start: 2022-03-24 | End: 2022-03-24 | Stop reason: SURG

## 2022-03-24 RX ORDER — LABETALOL HYDROCHLORIDE 5 MG/ML
5 INJECTION, SOLUTION INTRAVENOUS
Status: DISCONTINUED | OUTPATIENT
Start: 2022-03-24 | End: 2022-03-24 | Stop reason: HOSPADM

## 2022-03-24 RX ORDER — OXYCODONE HCL 5 MG/5 ML
10 SOLUTION, ORAL ORAL
Status: DISCONTINUED | OUTPATIENT
Start: 2022-03-24 | End: 2022-03-24 | Stop reason: HOSPADM

## 2022-03-24 RX ORDER — DIPHENHYDRAMINE HYDROCHLORIDE 50 MG/ML
12.5 INJECTION INTRAMUSCULAR; INTRAVENOUS
Status: DISCONTINUED | OUTPATIENT
Start: 2022-03-24 | End: 2022-03-24 | Stop reason: HOSPADM

## 2022-03-24 RX ORDER — MIDAZOLAM HYDROCHLORIDE 1 MG/ML
INJECTION INTRAMUSCULAR; INTRAVENOUS PRN
Status: DISCONTINUED | OUTPATIENT
Start: 2022-03-24 | End: 2022-03-24 | Stop reason: SURG

## 2022-03-24 RX ORDER — ONDANSETRON 2 MG/ML
4 INJECTION INTRAMUSCULAR; INTRAVENOUS
Status: DISCONTINUED | OUTPATIENT
Start: 2022-03-24 | End: 2022-03-24 | Stop reason: HOSPADM

## 2022-03-24 RX ORDER — HALOPERIDOL 5 MG/ML
1 INJECTION INTRAMUSCULAR
Status: DISCONTINUED | OUTPATIENT
Start: 2022-03-24 | End: 2022-03-24 | Stop reason: HOSPADM

## 2022-03-24 RX ORDER — GUANFACINE 1 MG/1
2 TABLET ORAL EVERY EVENING
Status: DISCONTINUED | OUTPATIENT
Start: 2022-03-24 | End: 2022-03-24 | Stop reason: HOSPADM

## 2022-03-24 RX ADMIN — SODIUM CHLORIDE, POTASSIUM CHLORIDE, SODIUM LACTATE AND CALCIUM CHLORIDE: 600; 310; 30; 20 INJECTION, SOLUTION INTRAVENOUS at 07:24

## 2022-03-24 RX ADMIN — GUANFACINE HYDROCHLORIDE 2 MG: 1 TABLET ORAL at 13:14

## 2022-03-24 RX ADMIN — PROPOFOL 50 MG: 10 INJECTION, EMULSION INTRAVENOUS at 07:33

## 2022-03-24 RX ADMIN — PROPOFOL 100 MG: 10 INJECTION, EMULSION INTRAVENOUS at 07:29

## 2022-03-24 RX ADMIN — MIDAZOLAM HYDROCHLORIDE 2 MG: 1 INJECTION, SOLUTION INTRAMUSCULAR; INTRAVENOUS at 07:23

## 2022-03-24 RX ADMIN — LAMOTRIGINE 50 MG: 25 TABLET ORAL at 12:21

## 2022-03-24 RX ADMIN — PROPOFOL 50 MG: 10 INJECTION, EMULSION INTRAVENOUS at 07:32

## 2022-03-24 RX ADMIN — OMEPRAZOLE 20 MG: 20 CAPSULE, DELAYED RELEASE ORAL at 05:10

## 2022-03-24 RX ADMIN — QUETIAPINE FUMARATE 100 MG: 100 TABLET ORAL at 12:21

## 2022-03-24 RX ADMIN — SODIUM CHLORIDE: 9 INJECTION, SOLUTION INTRAVENOUS at 00:00

## 2022-03-24 ASSESSMENT — PAIN DESCRIPTION - PAIN TYPE
TYPE: SURGICAL PAIN

## 2022-03-24 ASSESSMENT — PATIENT HEALTH QUESTIONNAIRE - PHQ9
2. FEELING DOWN, DEPRESSED, IRRITABLE, OR HOPELESS: NOT AT ALL
1. LITTLE INTEREST OR PLEASURE IN DOING THINGS: NOT AT ALL
SUM OF ALL RESPONSES TO PHQ9 QUESTIONS 1 AND 2: 0

## 2022-03-24 ASSESSMENT — PAIN SCALES - GENERAL: PAIN_LEVEL: 0

## 2022-03-24 NOTE — PROGRESS NOTES
Report received.  Assumed care.  Pt in bed, resting. AOx4, responds appropriately. Pain 0/10.  Denies SOB, n/v.  Reviewed POC, call light and belongings within reach, treaded slipper socks on, bed in lowest locked position.

## 2022-03-24 NOTE — PROGRESS NOTES
Pt states last takes medications at 1200 yesterday, would like to delay taking again until later this afternoon.

## 2022-03-24 NOTE — OR NURSING
0742- Pt to PACU from Endo. Report from anesthesia and OR RN. On 8L O2 via mask. Respirations even and unlabored. VSS.     0820- Pt awake. Tolerating sips of water.    0822- Dr. Snider at bedside.    0835- Report to Nakul BETANCOURT on CDU    0840- Pt's dad, Kiko, called and updated

## 2022-03-24 NOTE — PROGRESS NOTES
IV dc'd.  Discharge instructions given to patient; patient verbalizes understanding, all questions answered.  Copy of DC summary provided, signed copy in chart. 1 prescription electronically sent to pt's pharmacy. Pt states personal belongings are in possession.  Pt escorted off unit by this RN without incident.

## 2022-03-24 NOTE — ANESTHESIA TIME REPORT
Anesthesia Start and Stop Event Times     Date Time Event    3/24/2022 0717 Ready for Procedure     0724 Anesthesia Start     0744 Anesthesia Stop        Responsible Staff  03/24/22    Name Role Begin End    Anali Goodwin M.D. Anesth 0724 0744        Preop Diagnosis (Free Text):  Pre-op Diagnosis     ESOPHAGEAL DYSPHAGIA         Preop Diagnosis (Codes):    Premium Reason  Non-Premium    Comments:

## 2022-03-24 NOTE — ED NOTES
Med rec completed per patient  Allergies reviewed  No PO Antibiotics in the last 30 days     Patient takes his vitamins every other day.

## 2022-03-24 NOTE — CARE PLAN
The patient is Stable - Low risk of patient condition declining or worsening    Shift Goals  Clinical Goals: EGD tomorrow, rest  Patient Goals: rest  Family Goals: n/a    Progress made toward(s) clinical / shift goals:  Pt able to rest this shift. Understands and agrees w/ POC    Patient is not progressing towards the following goals:

## 2022-03-24 NOTE — DISCHARGE INSTRUCTIONS
Discharge Instructions    Discharged to home by car with relative. Discharged via wheelchair, hospital escort: Yes.  Special equipment needed: Not Applicable    Be sure to schedule a follow-up appointment with your primary care doctor or any specialists as instructed.     Discharge Plan:   Diet Plan: Discussed  Activity Level: Discussed  Confirmed Follow up Appointment: Patient to Call and Schedule Appointment  Confirmed Symptoms Management: Discussed  Medication Reconciliation Updated: Yes  Influenza Vaccine Indication: Patient Refuses    I understand that a diet low in cholesterol, fat, and sodium is recommended for good health. Unless I have been given specific instructions below for another diet, I accept this instruction as my diet prescription.   Other diet: Heart healthy     Special Instructions: None    · Is patient discharged on Warfarin / Coumadin?   No     Depression / Suicide Risk    As [facility] are [facility], it is important to learn how to keep safe from harming yourself.    Recognize the warning signs:  · Abrupt changes in personality, positive or negative- including increase in energy   · Giving away possessions  · Change in eating patterns- significant weight changes-  positive or negative  · Change in sleeping patterns- unable to sleep or sleeping all the time   · Unwillingness or inability to communicate  · Depression  · Unusual sadness, discouragement and loneliness  · Talk of wanting to die  · Neglect of personal appearance   · Rebelliousness- reckless behavior  · Withdrawal from people/activities they love  · Confusion- inability to concentrate     If you or a loved one observes any of these behaviors or has concerns about self-harm, here's what you can do:  · Talk about it- your feelings and reasons for harming yourself  · Remove any means that you might use to hurt yourself (examples: pills, rope, extension cords, firearm)  · Get professional help from the  community (Mental Health, Substance Abuse, psychological counseling)  · Do not be alone:Call your Safe Contact- someone whom you trust who will be there for you.  · Call your local CRISIS HOTLINE 227-1140 or 021-007-8985  · Call your local Children's Mobile Crisis Response Team [place] (718) 826-4943 or www.[employer]  · Call the toll free National Suicide Prevention Hotlines   · National Suicide Prevention Lifeline 943-339-BNJU (3733)  · National Hope Line Network 800-SUICIDE (680-8292)

## 2022-03-24 NOTE — ANESTHESIA POSTPROCEDURE EVALUATION
Patient: Santo Hernandez    Procedure Summary     Date: 03/24/22 Room / Location: [facility] ROOM 26 / SURGERY SAME DAY [facility]    Anesthesia Start: 0724 Anesthesia Stop: 0744    Procedures:       GASTROSCOPY (N/A Esophagus)      GASTROSCOPY, WITH BALLOON DILATION (N/A Esophagus)      GASTROSCOPY, WITH BIOPSY (N/A Esophagus) Diagnosis: (EGD ENDOSCOPICALLY NORMAL)    Surgeons: Alireza Snider M.D. Responsible Provider: Anali Goodwin M.D.    Anesthesia Type: general ASA Status: 2          Final Anesthesia Type: general  Last vitals  BP   Blood Pressure: 137/72    Temp   37.2 °C (99 °F)    Pulse   (!) 112   Resp   18    SpO2   97 %      Anesthesia Post Evaluation    Patient location during evaluation: PACU  Patient participation: complete - patient participated  Level of consciousness: sleepy but conscious  Pain score: 0    Airway patency: patent  Anesthetic complications: no  Cardiovascular status: hemodynamically stable  Respiratory status: acceptable and face mask  Hydration status: euvolemic    PONV: none          No complications documented.     Nurse Pain Score: 0 (NPRS)

## 2022-03-24 NOTE — PROGRESS NOTES
Pt arrived to unit via gurney. Pt ambulated from [place] to bed with steady gait. Assessment completed. Patient A&Ox4.  Respirations even, unlabored on room air, pt denies any difficulty breathing. Pt reports 2/10 abdominal pain, tolerable, declines interventions at this time.  Pt updated on POC, updated communication board.  Family at bedside. Bed in locked, lowest position.  Non-skid socks in place. Call light and personal possessions within reach.  Needs met.

## 2022-03-24 NOTE — PROCEDURES
Esophagogastroduodenoscopy      Indications: dysphagia, gerd  Previous procedures: none  Instrument: Olympus Flexible Endoscope  Sedation: mac    Pre-Anesthesia Assessment:  Prior to the procedure, a History and Physical was performed, and patient medications and allergies were reviewed. The patient’s tolerance of previous anesthesia was also reviewed. The risks and benefits of the procedure and the sedation options and risks were discussed with the patient including but not limited to infection, bleeding, aspiration, perforation, adverse medication reaction, missed diagnosis, and missed lesions. The patient verbalized understanding. All questions were answered, and informed consent was obtained.     Prior Anticoagulants: Patient has taken  none  ASA Grade Assessment: 2    After I obtained informed consent from the patient, the patient was placed in the left lateral position. Appropriate time-out protocol was followed: the correct patient, the correct procedure, and the correct equipment in the room were confirmed. Throughout the procedure, the patient’s blood pressure, pulse, and oxygen saturations were monitored continuously. The Olympus flexible gastroscope was gently passed through the incisoral orifice into the oral cavity and under direct visualization the esophagus was intubated. The endoscope was passed down the esophagus, through the stomach and into the 2nd portion of the duodenum. Retroflexion was performed at the gastroesophageal junction. Color, texture, mucosa and anatomy of esophagus, stomach, and duodenum were carefully examined with the scope.  After completion of the examination, the endoscope as removed. The patient tolerated the procedure well. There were no immediate postoperative complications.       Findings/Impressions:  Esophagus: GE junction at 41 cm.  Normal mucosa.  Biopsies were taken in separate jars from the distal esophagus and proximal esophagus throughout eosinophilic esophagitis.   Balloon dilatation was performed to 18 mm successfully.    Stomach: Normal mucosa.  Biopsies taken to rule out eosinophilic gastrointestinal disease.  Duodenum: No mucosa.  Biopsies taken to rule out eosinophilic gastrointestinal disease.      Recommendations: Follow-up biopsies  Consider escalating daily PPI therapy to twice a day  -Soft diet today may advance as tolerated tomorrow        This note was generated using voice recognition software which has a small chance of producing errors of grammar and possibly content. I have made every reasonable attempt to find and correct any obvious errors, but expect that some may not be found prior to finalization of this note    Alireza Snider MD, [facility]  [facility]

## 2022-03-24 NOTE — CARE PLAN
The patient is Stable - Low risk of patient condition declining or worsening    Shift Goals  Clinical Goals: comfort  Patient Goals: comfort  Family Goals: comfort      Problem: Knowledge Deficit - Standard  Goal: Patient and family/care givers will demonstrate understanding of plan of care, disease process/condition, diagnostic tests and medications  Outcome: Progressing     Problem: Pain - Standard  Goal: Alleviation of pain or a reduction in pain to the patient’s comfort goal  Outcome: Progressing

## 2022-03-25 NOTE — DISCHARGE SUMMARY
"Discharge Summary    CHIEF COMPLAINT ON ADMISSION  Chief Complaint   Patient presents with   • Difficulty Swallowing     x2 weeks and worse x2 days; pt states that he almost choked on food and medication this AM and pt was worried he was going to aspirate   • Difficulty Breathing     x2 weeks and worse x2 days; pt states that he has used his asthma inhaler multiple times today but states that his \"airway and throat just feel so constricted\"       Reason for Admission  Odynophagia     Admission Date  3/23/2022    CODE STATUS  Full Code    HPI & HOSPITAL COURSE  Per Dr. Marquez's HPI dated 3/24/2022:  \"Santo Hernandez is a 26 y.o. male who presented 3/23/2022 with past medical history of anxiety, is coming today complaining of several weeks of difficulty swallowing that gradually has gotten worse over the last 2 weeks, patient stated that food is getting stuck in his esophagus causing cough and sometimes he has to regurgitate food that he just ate, patient complaining of off-and-on shortness of breath, along with cough and choking sensation when he is eating, patient denies any fever chills no abdominal pain, patient has had severe GERD pretty much all his life, denies any focal weakness numbness tingling vision changes headache, patient was scheduled for endoscopy as outpatient next month but because his symptoms got worse he decided to come to the emergency room where he was evaluated by GI doctor and planning on EGD tomorrow, patient is going to remain on clear liquid diet, IV fluids, we are checking labs and chest x-ray, patient will be n.p.o. at midnight.\"    3/24 in CDU under my care: stable vitals. Pt underwent EGD - normal mucosa findings in esophagus, stomach and duodenum. Balloon dilatation to 18mm in esophagus. Biopsy taken. Pt was seen after EGD and during lunch time. Tolerated GI soft diet. Exam grossly unremarkable. Plan to increase PPI twice daily and a close outpatient follow up discussed. "     Therefore, he is discharged in fair and stable condition to home with close outpatient follow-up.    The patient recovered much more quickly than anticipated on admission.    Discharge Date  3/24/2022    FOLLOW UP ITEMS POST DISCHARGE  Biopsy     DISCHARGE DIAGNOSES  Principal Problem:    Odynophagia POA: Yes  Active Problems:    GERD (gastroesophageal reflux disease) POA: Yes    Intermittent asthma POA: Yes    Anxiety POA: Yes  Resolved Problems:    * No resolved hospital problems. *      FOLLOW UP  Future Appointments   Date Time Provider Department Center   4/11/2022  2:00 PM Clayton Ferrell M.D. [facility]   9/13/2022  1:00 PM Schuyler Hussein M.D. [facility] None     MEDICATIONS ON DISCHARGE     Medication List      CHANGE how you take these medications      Instructions   pantoprazole 40 MG Tbec  What changed: when to take this  Commonly known as: PROTONIX   Take 1 Tablet by mouth 2 times a day.  Dose: 40 mg        CONTINUE taking these medications      Instructions   albuterol 108 (90 Base) MCG/ACT Aers inhalation aerosol   Inhale 2 Puffs every 6 hours as needed for Shortness of Breath.  Dose: 2 Puff     CALCIUM PO   Take 1 Tablet by mouth every 48 hours.  Dose: 1 Tablet     cetirizine 10 MG Tabs  Commonly known as: ZYRTEC   Take 10 mg by mouth every day.  Dose: 10 mg     Guanfacine HCl 2 MG Tabs   Take 2 mg by mouth every day.  Dose: 2 mg     LamoTRIgine 100 MG Tb24   Take 100 mg by mouth every day.  Dose: 100 mg     METHYLFOLATE PO   Take 1 Tablet by mouth every 48 hours.  Dose: 1 Tablet     QUEtiapine 100 MG Tabs  Commonly known as: Seroquel   Take 100 mg by mouth every day.  Dose: 100 mg     triamcinolone acetonide 0.1 % Crea  Commonly known as: KENALOG   Apply a thin layer to rash BID PRN. Do not use longer than 7 days straight.     VITAMIN B-12 PO   Take 1 Tablet by mouth every 48 hours.  Dose: 1 Tablet     VITAMIN D PO   Take 1 Tablet by mouth every 48 hours.  Dose: 1 Tablet             Allergies  Allergies   Allergen Reactions   • Ibuprofen Swelling   • Shellfish Allergy        DIET  Orders Placed This Encounter   Procedures   • Diet Order Diet: Level 6 - Soft and Bite Sized; Liquid level: Level 2 - Mildly Thick     Standing Status:   Standing     Number of Occurrences:   1     Order Specific Question:   Diet:     Answer:   Level 6 - Soft and Bite Sized [23]     Order Specific Question:   Liquid level     Answer:   Level 2 - Mildly Thick       ACTIVITY  As tolerated.  Weight bearing as tolerated    CONSULTATIONS  GI    PROCEDURES  EGD    LABORATORY  Lab Results   Component Value Date    SODIUM 139 02/16/2022    POTASSIUM 4.2 02/16/2022    CHLORIDE 97 02/16/2022    CO2 19 (L) 02/16/2022    GLUCOSE 63 (L) 02/16/2022    BUN 10 02/16/2022    CREATININE 1.12 02/16/2022        Lab Results   Component Value Date    WBC 10.1 04/25/2021    HEMOGLOBIN 14.3 04/25/2021    HEMATOCRIT 44.0 04/25/2021    PLATELETCT 270 04/25/2021        Total time of the discharge process exceeds 36 minutes.

## 2022-03-31 ENCOUNTER — APPOINTMENT (OUTPATIENT)
Dept: RADIOLOGY | Facility: MEDICAL CENTER | Age: 27
End: 2022-03-31
Attending: EMERGENCY MEDICINE
Payer: COMMERCIAL

## 2022-03-31 ENCOUNTER — HOSPITAL ENCOUNTER (EMERGENCY)
Facility: MEDICAL CENTER | Age: 27
End: 2022-03-31
Attending: EMERGENCY MEDICINE
Payer: COMMERCIAL

## 2022-03-31 ENCOUNTER — APPOINTMENT (OUTPATIENT)
Dept: RADIOLOGY | Facility: MEDICAL CENTER | Age: 27
End: 2022-03-31
Payer: COMMERCIAL

## 2022-03-31 VITALS
OXYGEN SATURATION: 97 % | HEART RATE: 89 BPM | TEMPERATURE: 98.1 F | BODY MASS INDEX: 23.38 KG/M2 | SYSTOLIC BLOOD PRESSURE: 117 MMHG | WEIGHT: 167 LBS | HEIGHT: 71 IN | RESPIRATION RATE: 14 BRPM | DIASTOLIC BLOOD PRESSURE: 68 MMHG

## 2022-03-31 DIAGNOSIS — R00.0 TACHYCARDIA: ICD-10-CM

## 2022-03-31 DIAGNOSIS — K21.00 GASTROESOPHAGEAL REFLUX DISEASE WITH ESOPHAGITIS WITHOUT HEMORRHAGE: ICD-10-CM

## 2022-03-31 LAB
ALBUMIN SERPL BCP-MCNC: 5.2 G/DL (ref 3.2–4.9)
ALBUMIN/GLOB SERPL: 2.3 G/DL
ALP SERPL-CCNC: 87 U/L (ref 30–99)
ALT SERPL-CCNC: 13 U/L (ref 2–50)
ANION GAP SERPL CALC-SCNC: 21 MMOL/L (ref 7–16)
AST SERPL-CCNC: 17 U/L (ref 12–45)
BASOPHILS # BLD AUTO: 0.8 % (ref 0–1.8)
BASOPHILS # BLD: 0.04 K/UL (ref 0–0.12)
BILIRUB SERPL-MCNC: 0.9 MG/DL (ref 0.1–1.5)
BUN SERPL-MCNC: 8 MG/DL (ref 8–22)
CALCIUM SERPL-MCNC: 10.1 MG/DL (ref 8.5–10.5)
CHLORIDE SERPL-SCNC: 98 MMOL/L (ref 96–112)
CO2 SERPL-SCNC: 19 MMOL/L (ref 20–33)
CREAT SERPL-MCNC: 1.05 MG/DL (ref 0.5–1.4)
EKG IMPRESSION: NORMAL
EOSINOPHIL # BLD AUTO: 0.19 K/UL (ref 0–0.51)
EOSINOPHIL NFR BLD: 4 % (ref 0–6.9)
ERYTHROCYTE [DISTWIDTH] IN BLOOD BY AUTOMATED COUNT: 39.9 FL (ref 35.9–50)
GFR SERPLBLD CREATININE-BSD FMLA CKD-EPI: 99 ML/MIN/1.73 M 2
GLOBULIN SER CALC-MCNC: 2.3 G/DL (ref 1.9–3.5)
GLUCOSE SERPL-MCNC: 74 MG/DL (ref 65–99)
HCT VFR BLD AUTO: 50.8 % (ref 42–52)
HGB BLD-MCNC: 16.8 G/DL (ref 14–18)
IMM GRANULOCYTES # BLD AUTO: 0.01 K/UL (ref 0–0.11)
IMM GRANULOCYTES NFR BLD AUTO: 0.2 % (ref 0–0.9)
LYMPHOCYTES # BLD AUTO: 1.47 K/UL (ref 1–4.8)
LYMPHOCYTES NFR BLD: 31.1 % (ref 22–41)
MCH RBC QN AUTO: 30.4 PG (ref 27–33)
MCHC RBC AUTO-ENTMCNC: 33.1 G/DL (ref 33.7–35.3)
MCV RBC AUTO: 91.9 FL (ref 81.4–97.8)
MONOCYTES # BLD AUTO: 0.4 K/UL (ref 0–0.85)
MONOCYTES NFR BLD AUTO: 8.5 % (ref 0–13.4)
NEUTROPHILS # BLD AUTO: 2.61 K/UL (ref 1.82–7.42)
NEUTROPHILS NFR BLD: 55.4 % (ref 44–72)
NRBC # BLD AUTO: 0 K/UL
NRBC BLD-RTO: 0 /100 WBC
PLATELET # BLD AUTO: 320 K/UL (ref 164–446)
PMV BLD AUTO: 9.7 FL (ref 9–12.9)
POTASSIUM SERPL-SCNC: 4.1 MMOL/L (ref 3.6–5.5)
PROT SERPL-MCNC: 7.5 G/DL (ref 6–8.2)
RBC # BLD AUTO: 5.53 M/UL (ref 4.7–6.1)
SODIUM SERPL-SCNC: 138 MMOL/L (ref 135–145)
T4 FREE SERPL-MCNC: 1.49 NG/DL (ref 0.93–1.7)
TROPONIN T SERPL-MCNC: 12 NG/L (ref 6–19)
TROPONIN T SERPL-MCNC: 15 NG/L (ref 6–19)
TSH SERPL DL<=0.005 MIU/L-ACNC: 0.57 UIU/ML (ref 0.38–5.33)
WBC # BLD AUTO: 4.7 K/UL (ref 4.8–10.8)

## 2022-03-31 PROCEDURE — 85025 COMPLETE CBC W/AUTO DIFF WBC: CPT

## 2022-03-31 PROCEDURE — 84443 ASSAY THYROID STIM HORMONE: CPT

## 2022-03-31 PROCEDURE — 93005 ELECTROCARDIOGRAM TRACING: CPT | Performed by: EMERGENCY MEDICINE

## 2022-03-31 PROCEDURE — 84439 ASSAY OF FREE THYROXINE: CPT

## 2022-03-31 PROCEDURE — 84484 ASSAY OF TROPONIN QUANT: CPT

## 2022-03-31 PROCEDURE — 93005 ELECTROCARDIOGRAM TRACING: CPT

## 2022-03-31 PROCEDURE — 71045 X-RAY EXAM CHEST 1 VIEW: CPT

## 2022-03-31 PROCEDURE — 36415 COLL VENOUS BLD VENIPUNCTURE: CPT

## 2022-03-31 PROCEDURE — 99284 EMERGENCY DEPT VISIT MOD MDM: CPT

## 2022-03-31 PROCEDURE — 80053 COMPREHEN METABOLIC PANEL: CPT

## 2022-03-31 ASSESSMENT — FIBROSIS 4 INDEX: FIB4 SCORE: 0.53

## 2022-03-31 NOTE — ED NOTES
Ambulatory to room. Agree with triage note. In gown and on monitor. HR currently 97 NSR. Call light within reach. Chart up for ERP.

## 2022-03-31 NOTE — ED TRIAGE NOTES
"Pt ambulatory to triage c/o rapid heart rate that has been ongoing x 3 days. Pt states he was at GI doctor appt today for a barium swallow eval and he was told to come to ER due to . Pt states he has GERD and it has been \"really bothering me, I can't really eat or sleep\" nad  "

## 2022-03-31 NOTE — ED PROVIDER NOTES
ED Provider Note    CHIEF COMPLAINT  Chief Complaint   Patient presents with   • Rapid Heart Beat       HPI  Santo Hernandez is a 26 y.o. male who presents to the emergency department with a rapid heartbeat.  Patient has been dealing with severe GERD to the point where he is having a hard time sleeping at night.  He reports that he has indigestion and reflux up into his throat.  This makes it hard for him to eat or drink anything.  He will have some shortness of breath and burning in his chest.  He went to gastroenterology appointment today.  He was noted to have a very high heart rate and referred to the ER.  He has no leg swelling or leg pain.  No hemoptysis or pleuritic pain.  He has not had a fever.  No no abdominal pain.  He is scheduled to have a barium swallow on an outpatient basis for evaluation of his gastroesophageal reflux.  He is currently taking twice a day Protonix as well as Carafate since admitted to the hospital 3/23.  He had upper endoscopy 3/24 with balloon dilation of his esophagus..  He denies any acute changes since that time.    REVIEW OF SYSTEMS  As per HPI, otherwise a 10 point review of systems is negative    PAST MEDICAL HISTORY  Past Medical History:   Diagnosis Date   • Asthma    • B12 deficiency 10/12/2018   • Eczema    • GERD (gastroesophageal reflux disease)    • BOB (obstructive sleep apnea) 10/12/2018    +cpap       SOCIAL HISTORY  Social History     Tobacco Use   • Smoking status: Former Smoker   • Smokeless tobacco: Never Used   Vaping Use   • Vaping Use: Former   Substance Use Topics   • Alcohol use: Not Currently   • Drug use: Yes     Types: Marijuana, Inhaled     Comment: marijuana       SURGICAL HISTORY  Past Surgical History:   Procedure Laterality Date   • UT UPPER GI ENDOSCOPY,DIAGNOSIS N/A 3/24/2022    Procedure: GASTROSCOPY;  Surgeon: Alireza Snider M.D.;  Location: SURGERY SAME DAY [facility];  Service: Gastroenterology   • UT UPPER GI ENDOSCOPY,W/DILAT,GASTRIC OUT  "N/A 3/24/2022    Procedure: GASTROSCOPY, WITH BALLOON DILATION;  Surgeon: Alireza Snider M.D.;  Location: SURGERY SAME DAY [facility];  Service: Gastroenterology   • DE UPPER GI ENDOSCOPY,BIOPSY N/A 3/24/2022    Procedure: GASTROSCOPY, WITH BIOPSY;  Surgeon: Alireza Snider M.D.;  Location: SURGERY SAME DAY [facility];  Service: Gastroenterology   • DE INJ CERV/THORAC,W/ IMAGING  8/16/2021    Procedure: C7-T1 interlaminar epidural steroid injection;  Surgeon: Schuyler Hussein M.D.;  Location: SURGERY REHAB PAIN MANAGEMENT;  Service: Pain Management       CURRENT MEDICATIONS  Home Medications     Reviewed by Sarah Stevens R.N. (Registered Nurse) on 03/31/22 at 1043  Med List Status: Partial   Medication Last Dose Status   albuterol 108 (90 Base) MCG/ACT Aero Soln inhalation aerosol  Active   CALCIUM PO  Active   cetirizine (ZYRTEC) 10 MG Tab  Active   Cyanocobalamin (VITAMIN B-12 PO)  Active   Guanfacine HCl 2 MG Tab  Active   LamoTRIgine 100 MG TABLET SR 24 HR  Active   Levomefolate Glucosamine (METHYLFOLATE PO)  Active   pantoprazole (PROTONIX) 40 MG Tablet Delayed Response  Active   QUEtiapine (SEROQUEL) 100 MG Tab  Active   triamcinolone acetonide (KENALOG) 0.1 % Cream  Active   VITAMIN D PO  Active                ALLERGIES  Allergies   Allergen Reactions   • Ibuprofen Swelling   • Shellfish Allergy        PHYSICAL EXAM  VITAL SIGNS: /76   Pulse 79   Temp 37.1 °C (98.8 °F) (Temporal)   Resp (!) 28   Ht 1.803 m (5' 11\")   Wt 75.8 kg (167 lb)   SpO2 98%   BMI 23.29 kg/m²    Constitutional: Awake and alert  HENT: Normal inspection  Eyes: Normal inspection  Neck: Grossly normal range of motion.  Cardiovascular: Normal heart rate, Normal rhythm.  Symmetric peripheral pulses.   Thorax & Lungs: No respiratory distress, No wheezing, No rales, No rhonchi, No chest tenderness.   Abdomen: Bowel sounds normal, soft, non-distended, nontender, no mass  Skin: No obvious rash.  Back: No tenderness, No CVA " tenderness.   Extremities: No clubbing, cyanosis, edema, no Homans or cords.  Neurologic: Grossly normal   Psychiatric: Normal for situation    RADIOLOGY/PROCEDURES  DX-CHEST-PORTABLE (1 VIEW)   Final Result      1.  No acute cardiac or pulmonary abnormalities are identified.           Imaging is interpreted by radiologist    Labs:  Results for orders placed or performed during the hospital encounter of 03/31/22   CBC with Differential   Result Value Ref Range    WBC 4.7 (L) 4.8 - 10.8 K/uL    RBC 5.53 4.70 - 6.10 M/uL    Hemoglobin 16.8 14.0 - 18.0 g/dL    Hematocrit 50.8 42.0 - 52.0 %    MCV 91.9 81.4 - 97.8 fL    MCH 30.4 27.0 - 33.0 pg    MCHC 33.1 (L) 33.7 - 35.3 g/dL    RDW 39.9 35.9 - 50.0 fL    Platelet Count 320 164 - 446 K/uL    MPV 9.7 9.0 - 12.9 fL    Neutrophils-Polys 55.40 44.00 - 72.00 %    Lymphocytes 31.10 22.00 - 41.00 %    Monocytes 8.50 0.00 - 13.40 %    Eosinophils 4.00 0.00 - 6.90 %    Basophils 0.80 0.00 - 1.80 %    Immature Granulocytes 0.20 0.00 - 0.90 %    Nucleated RBC 0.00 /100 WBC    Neutrophils (Absolute) 2.61 1.82 - 7.42 K/uL    Lymphs (Absolute) 1.47 1.00 - 4.80 K/uL    Monos (Absolute) 0.40 0.00 - 0.85 K/uL    Eos (Absolute) 0.19 0.00 - 0.51 K/uL    Baso (Absolute) 0.04 0.00 - 0.12 K/uL    Immature Granulocytes (abs) 0.01 0.00 - 0.11 K/uL    NRBC (Absolute) 0.00 K/uL   Complete Metabolic Panel (CMP)   Result Value Ref Range    Sodium 138 135 - 145 mmol/L    Potassium 4.1 3.6 - 5.5 mmol/L    Chloride 98 96 - 112 mmol/L    Co2 19 (L) 20 - 33 mmol/L    Anion Gap 21.0 (H) 7.0 - 16.0    Glucose 74 65 - 99 mg/dL    Bun 8 8 - 22 mg/dL    Creatinine 1.05 0.50 - 1.40 mg/dL    Calcium 10.1 8.5 - 10.5 mg/dL    AST(SGOT) 17 12 - 45 U/L    ALT(SGPT) 13 2 - 50 U/L    Alkaline Phosphatase 87 30 - 99 U/L    Total Bilirubin 0.9 0.1 - 1.5 mg/dL    Albumin 5.2 (H) 3.2 - 4.9 g/dL    Total Protein 7.5 6.0 - 8.2 g/dL    Globulin 2.3 1.9 - 3.5 g/dL    A-G Ratio 2.3 g/dL   Troponin   Result Value Ref Range     Troponin T 15 6 - 19 ng/L   ESTIMATED GFR   Result Value Ref Range    GFR (CKD-EPI) 99 >60 mL/min/1.73 m 2   EKG (NOW)   Result Value Ref Range    Report       [facility] Emergency Dept.    Test Date:  2022  Pt Name:    JOHNSON MAYA                  Department: ER  MRN:        [id number]                      Room:  Gender:     Male                         Technician: [id number]  :        1995                   Requested By:ER TRIAGE PROTOCOL  Order #:    [id number]                    Reading MD: RAMAN BRISCOE MD    Measurements  Intervals                                Axis  Rate:       107                          P:          83  MI:         144                          QRS:        81  QRSD:       86                           T:          70  QT:         324  QTc:        433    Interpretive Statements  SINUS TACHYCARDIA  RIGHT ATRIAL ABNORMALITY    Compared to ECG 2021 10:52:56  Atrial abnormality now present  Electronically Signed On 3- 12:15:48 PDT by RAMAN BRISCOE MD           COURSE & MEDICAL DECISION MAKING  Patient presents with tachycardia.  This is likely multifactorial including mild dehydration, discomfort from gastroesophageal reflux, lack of sleep, stress and anxiety around everything that has been happening.  Obtain screening laboratory data.  Obtain EKG as he has had chest discomfort likely esophageal.  EKG does not show any acute ischemia.  Troponin is within normal limits.  Obtain delta which is unchanged.  Obtain thyroid panel this is unremarkable.  His CBC does not show leukocytosis or left shift.  No suggestion of infectious process.  Electrolytes are acceptable.    Patient's heart rate normalized in the ER.  He is able to tolerate liquids.  He is currently being worked up for gastroesophageal reflux.  He will undergo outpatient barium swallow.  Patient will return to ER for difficulty breathing, fevers, pain or concern.    FINAL IMPRESSION  1.   Tachycardia  2.  Mild dehydration  3.  Gastroesophageal reflux  4.  Insomnia      This dictation was created using voice recognition software. The accuracy of the dictation is limited to the abilities of the software.  The nursing notes were reviewed and certain aspects of this information were incorporated into this note.      Electronically signed by: Josse Molina M.D., 3/31/2022 1:30 PM

## 2022-04-11 ENCOUNTER — TELEMEDICINE (OUTPATIENT)
Dept: MEDICAL GROUP | Facility: MEDICAL CENTER | Age: 27
End: 2022-04-11
Payer: COMMERCIAL

## 2022-04-11 VITALS
DIASTOLIC BLOOD PRESSURE: 80 MMHG | BODY MASS INDEX: 21.98 KG/M2 | SYSTOLIC BLOOD PRESSURE: 120 MMHG | HEART RATE: 99 BPM | HEIGHT: 71 IN | WEIGHT: 157 LBS

## 2022-04-11 DIAGNOSIS — R13.10 ODYNOPHAGIA: ICD-10-CM

## 2022-04-11 PROBLEM — E16.2 HYPOGLYCEMIA: Status: RESOLVED | Noted: 2022-02-28 | Resolved: 2022-04-11

## 2022-04-11 PROCEDURE — 99212 OFFICE O/P EST SF 10 MIN: CPT | Mod: 95 | Performed by: FAMILY MEDICINE

## 2022-04-11 ASSESSMENT — FIBROSIS 4 INDEX: FIB4 SCORE: 0.41

## 2022-04-11 NOTE — ASSESSMENT & PLAN NOTE
The patient is currently under the care of gastroenterology for dysphagia status post dilation.  He has an upcoming barium swallow.  He did have some tachycardia which necessitated an ER visit but he is doing better now.  He denies chest pain, shortness of breath and palpitations.  He is on Protonix.

## 2022-04-11 NOTE — PROGRESS NOTES
Virtual Visit: Established Patient   This visit was conducted via Zoom using secure and encrypted videoconferencing technology.   The patient was in their home in the state of [place].    The patient's identity was confirmed and verbal consent was obtained for this virtual visit.    Subjective:   CC:   Chief Complaint   Patient presents with   • Follow-Up     Santo Hernandez is a 26 y.o. male presenting for evaluation and management of:    Odynophagia  The patient is currently under the care of gastroenterology for dysphagia status post dilation.  He has an upcoming barium swallow.  He did have some tachycardia which necessitated an ER visit but he is doing better now.  He denies chest pain, shortness of breath and palpitations.  He is on Protonix.      Current medicines (including changes today)  Current Outpatient Medications   Medication Sig Dispense Refill   • pantoprazole (PROTONIX) 40 MG Tablet Delayed Response Take 1 Tablet by mouth 2 times a day. 60 Tablet 0   • VITAMIN D PO Take 1 Tablet by mouth every 48 hours.     • CALCIUM PO Take 1 Tablet by mouth every 48 hours.     • Cyanocobalamin (VITAMIN B-12 PO) Take 1 Tablet by mouth every 48 hours.     • Levomefolate Glucosamine (METHYLFOLATE PO) Take 1 Tablet by mouth every 48 hours.     • Guanfacine HCl 2 MG Tab Take 2 mg by mouth every day.     • QUEtiapine (SEROQUEL) 100 MG Tab Take 100 mg by mouth every day.     • LamoTRIgine 100 MG TABLET SR 24 HR Take 100 mg by mouth every day.     • triamcinolone acetonide (KENALOG) 0.1 % Cream Apply a thin layer to rash BID PRN. Do not use longer than 7 days straight. 60 g 1   • cetirizine (ZYRTEC) 10 MG Tab Take 10 mg by mouth every day.     • albuterol 108 (90 Base) MCG/ACT Aero Soln inhalation aerosol Inhale 2 Puffs every 6 hours as needed for Shortness of Breath. 8.5 g 3     No current facility-administered medications for this visit.       Patient Active Problem List    Diagnosis Date Noted   • Odynophagia  "03/23/2022   • Eczema 07/13/2021   • Cervical radiculopathy at C7 05/20/2021   • VSD (ventricular septal defect) 02/23/2021   • Vaccine counseling 07/08/2019   • Other insomnia 01/24/2019   • Anxiety 01/24/2019   • BOB (obstructive sleep apnea) 10/12/2018   • B12 deficiency 10/12/2018   • GERD (gastroesophageal reflux disease) 07/05/2011   • Intermittent asthma 10/14/2008        Objective:   /80 (BP Location: Left arm, Patient Position: Sitting, BP Cuff Size: Adult) Comment: Pt reported  Pulse 99 Comment: Pt reported  Ht 1.803 m (5' 11\") Comment: Pt reported  Wt 71.2 kg (157 lb) Comment: Pt reported  BMI 21.90 kg/m²     Physical Exam:  Constitutional: Alert, no distress, well-groomed.    Assessment and Plan:   The following treatment plan was discussed:     1. Odynophagia  - f/u GI.  - continue protonix.       Follow-up: Return in about 2 weeks (around 4/25/2022), or if symptoms worsen or fail to improve.         "

## 2022-04-18 ENCOUNTER — HOSPITAL ENCOUNTER (OUTPATIENT)
Dept: RADIOLOGY | Facility: MEDICAL CENTER | Age: 27
End: 2022-04-18
Attending: INTERNAL MEDICINE
Payer: COMMERCIAL

## 2022-04-18 DIAGNOSIS — R13.19 ESOPHAGEAL DYSPHAGIA: ICD-10-CM

## 2022-04-18 PROCEDURE — 74220 X-RAY XM ESOPHAGUS 1CNTRST: CPT

## 2022-04-18 PROCEDURE — 700117 HCHG RX CONTRAST REV CODE 255: Performed by: INTERNAL MEDICINE

## 2022-04-18 RX ADMIN — BARIUM SULFATE 700 MG: 700 TABLET ORAL at 14:30

## 2022-04-25 ENCOUNTER — OFFICE VISIT (OUTPATIENT)
Dept: MEDICAL GROUP | Facility: MEDICAL CENTER | Age: 27
End: 2022-04-25
Payer: COMMERCIAL

## 2022-04-25 ENCOUNTER — HOSPITAL ENCOUNTER (OUTPATIENT)
Dept: LAB | Facility: MEDICAL CENTER | Age: 27
End: 2022-04-25
Attending: FAMILY MEDICINE
Payer: COMMERCIAL

## 2022-04-25 VITALS
OXYGEN SATURATION: 95 % | BODY MASS INDEX: 21.47 KG/M2 | HEIGHT: 71 IN | SYSTOLIC BLOOD PRESSURE: 104 MMHG | RESPIRATION RATE: 18 BRPM | TEMPERATURE: 98.6 F | HEART RATE: 93 BPM | WEIGHT: 153.33 LBS | DIASTOLIC BLOOD PRESSURE: 60 MMHG

## 2022-04-25 DIAGNOSIS — R00.0 TACHYCARDIA: ICD-10-CM

## 2022-04-25 DIAGNOSIS — R07.9 CHEST PAIN, UNSPECIFIED TYPE: ICD-10-CM

## 2022-04-25 LAB — D DIMER PPP IA.FEU-MCNC: <0.27 UG/ML (FEU) (ref 0–0.5)

## 2022-04-25 PROCEDURE — 99214 OFFICE O/P EST MOD 30 MIN: CPT | Mod: 25 | Performed by: FAMILY MEDICINE

## 2022-04-25 PROCEDURE — 36415 COLL VENOUS BLD VENIPUNCTURE: CPT

## 2022-04-25 PROCEDURE — 85379 FIBRIN DEGRADATION QUANT: CPT

## 2022-04-25 ASSESSMENT — FIBROSIS 4 INDEX: FIB4 SCORE: 0.41

## 2022-04-25 NOTE — PROGRESS NOTES
[facility]  Progress Note  Established Patient    Subjective:   Santo Hernandez is a 26 y.o. male here today with a chief complaint of tachycardia.     Tachycardia  Patient states that his gastroenterologist would like him further evaluated for his fast heart rate.  He describes longstanding anxiety but this preceded the fast heart rate.  The patient does endorse palpitations a few times a week along with occasional associated chest pain and shortness of breath which may be related to his reflux.  He had an echocardiogram done in April 2021 which was normal.  He recently had a reassuring CBC, CMP, TSH and troponin.       Current Outpatient Medications on File Prior to Visit   Medication Sig Dispense Refill   • pantoprazole (PROTONIX) 40 MG Tablet Delayed Response Take 1 Tablet by mouth 2 times a day. 60 Tablet 0   • VITAMIN D PO Take 1 Tablet by mouth every 48 hours.     • CALCIUM PO Take 1 Tablet by mouth every 48 hours.     • Cyanocobalamin (VITAMIN B-12 PO) Take 1 Tablet by mouth every 48 hours.     • Levomefolate Glucosamine (METHYLFOLATE PO) Take 1 Tablet by mouth every 48 hours.     • Guanfacine HCl 2 MG Tab Take 2 mg by mouth every day.     • QUEtiapine (SEROQUEL) 100 MG Tab Take 100 mg by mouth every day.     • LamoTRIgine 100 MG TABLET SR 24 HR Take 100 mg by mouth every day.     • triamcinolone acetonide (KENALOG) 0.1 % Cream Apply a thin layer to rash BID PRN. Do not use longer than 7 days straight. 60 g 1   • cetirizine (ZYRTEC) 10 MG Tab Take 10 mg by mouth every day.     • albuterol 108 (90 Base) MCG/ACT Aero Soln inhalation aerosol Inhale 2 Puffs every 6 hours as needed for Shortness of Breath. 8.5 g 3     No current facility-administered medications on file prior to visit.          Objective:     Vitals:    04/25/22 1437   BP: 104/60   BP Location: Left arm   Patient Position: Sitting   BP Cuff Size: Adult long   Pulse: 93   Resp: 18   Temp: 37 °C (98.6 °F)   TempSrc: Temporal   SpO2:  "95%   Weight: 69.6 kg (153 lb 5.3 oz)   Height: 1.803 m (5' 11\")       Physical Exam:  General: alert in no apparent distress.   Cardio: RRR.   Resp: CTAB.         Assessment and Plan:     1. Tachycardia  EKG reassuring, no arrhythmia or QT prolongation. Suspect tachycardia is  physiologic but will complete w/u below. Asked pt to get D-dimer today.   - D-DIMER; Future  - Cardiac Stress Test Treadmill Only  - [facility] ZIO PATCH MONITOR; Future  - CXR.         Followup: Return if symptoms worsen or fail to improve.         "

## 2022-04-25 NOTE — ASSESSMENT & PLAN NOTE
Patient states that his gastroenterologist would like him further evaluated for his fast heart rate.  He describes longstanding anxiety but this preceded the fast heart rate.  The patient does endorse palpitations a few times a week along with occasional associated chest pain and shortness of breath which may be related to his reflux.  He had an echocardiogram done in April 2021 which was normal.  He recently had a reassuring CBC, CMP, TSH and troponin.

## 2022-05-04 ENCOUNTER — NON-PROVIDER VISIT (OUTPATIENT)
Dept: CARDIOLOGY | Facility: MEDICAL CENTER | Age: 27
End: 2022-05-04
Attending: FAMILY MEDICINE
Payer: COMMERCIAL

## 2022-05-04 ENCOUNTER — HOSPITAL ENCOUNTER (OUTPATIENT)
Dept: RADIOLOGY | Facility: MEDICAL CENTER | Age: 27
End: 2022-05-04
Attending: FAMILY MEDICINE
Payer: COMMERCIAL

## 2022-05-04 DIAGNOSIS — R00.0 TACHYCARDIA: ICD-10-CM

## 2022-05-04 DIAGNOSIS — I49.1 ATRIAL PREMATURE CONTRACTIONS: ICD-10-CM

## 2022-05-04 DIAGNOSIS — I49.3 PVCS (PREMATURE VENTRICULAR CONTRACTIONS): ICD-10-CM

## 2022-05-04 DIAGNOSIS — R07.9 CHEST PAIN, UNSPECIFIED TYPE: ICD-10-CM

## 2022-05-04 PROCEDURE — 71046 X-RAY EXAM CHEST 2 VIEWS: CPT

## 2022-05-04 NOTE — PROGRESS NOTES
Patient enrolled in the 14 day ePatch Holter monitoring program, per Clayton Ferrell M.D.  >In clinic hook up, monitor serial #[id number].  >Pending EOS.

## 2022-05-27 ENCOUNTER — TELEPHONE (OUTPATIENT)
Dept: CARDIOLOGY | Facility: MEDICAL CENTER | Age: 27
End: 2022-05-27
Payer: COMMERCIAL

## 2022-06-15 PROCEDURE — 93228 REMOTE 30 DAY ECG REV/REPORT: CPT | Performed by: INTERNAL MEDICINE

## 2022-06-22 PROCEDURE — RXMED WILLOW AMBULATORY MEDICATION CHARGE: Performed by: INTERNAL MEDICINE

## 2022-07-06 ENCOUNTER — PHARMACY VISIT (OUTPATIENT)
Dept: PHARMACY | Facility: MEDICAL CENTER | Age: 27
End: 2022-07-06
Payer: COMMERCIAL

## 2022-07-28 ENCOUNTER — APPOINTMENT (OUTPATIENT)
Dept: RADIOLOGY | Facility: MEDICAL CENTER | Age: 27
End: 2022-07-28
Attending: FAMILY MEDICINE

## 2023-07-31 NOTE — ED TRIAGE NOTES
"Chief Complaint   Patient presents with   • Lump     in left side of neck since thursday x 4 days   • Fatigue     my body just feels fatigue just left \"my left side\" x 4 days     Was told by NP to come in for further evaluation and to get CT scan. Educated on triage process. Instructed to notify staff for any worsening symptoms. Denies any recent travel. Denies exposure to known covid positive patients. Denies any respiratory symptoms.   "
chest pain

## 2024-12-19 NOTE — PROGRESS NOTES
1730- Patient transported to T215. No acute needs at this time. POC reviewed with dad and patient.    Performed Resulted

## (undated) DEVICE — SENSOR SPO2 NEO LNCS ADHESIVE (20/BX) SEE USER NOTES

## (undated) DEVICE — TUBE CONNECTING SUCTION - CLEAR PLASTIC STERILE 72 IN (50EA/CA)

## (undated) DEVICE — FORCEP RADIAL JAW 4 STANDARD CAPACITY W/NEEDLE 240CM (40EA/BX)

## (undated) DEVICE — MASK PANORAMIC OXYGEN PRO2 (30EA/CA)

## (undated) DEVICE — ELECTRODE 850 FOAM ADHESIVE - HYDROGEL RADIOTRNSPRNT (50/PK)

## (undated) DEVICE — KIT CUSTOM PROCEDURE SINGLE FOR ENDO  (15/CA)

## (undated) DEVICE — SYRINGE ALLIANCE INFLATION (5EA/BX)

## (undated) DEVICE — SET LEADWIRE 5 LEAD BEDSIDE DISPOSABLE ECG (1SET OF 5/EA)

## (undated) DEVICE — BALLOON CRE WG 15-18MM 240CM 5.5 F G

## (undated) DEVICE — WATER IRRIGATION STERILE 1000ML (12EA/CA)

## (undated) DEVICE — CANISTER SUCTION RIGID RED 1500CC (40EA/CA)

## (undated) DEVICE — FILM CASSETTE ENDO

## (undated) DEVICE — TOWEL STOP TIMEOUT SAFETY FLAG (40EA/CA)

## (undated) DEVICE — BITE BLOCK ADULT 60FR (100EA/CA)

## (undated) DEVICE — CONTAINER, SPECIMEN, STERILE